# Patient Record
Sex: FEMALE | Race: WHITE | NOT HISPANIC OR LATINO | Employment: FULL TIME | URBAN - METROPOLITAN AREA
[De-identification: names, ages, dates, MRNs, and addresses within clinical notes are randomized per-mention and may not be internally consistent; named-entity substitution may affect disease eponyms.]

---

## 2024-01-19 ENCOUNTER — APPOINTMENT (OUTPATIENT)
Dept: INTERVENTIONAL RADIOLOGY/VASCULAR | Facility: CLINIC | Age: 29
DRG: 270 | End: 2024-01-19
Payer: COMMERCIAL

## 2024-01-19 ENCOUNTER — HOSPITAL ENCOUNTER (INPATIENT)
Facility: CLINIC | Age: 29
LOS: 5 days | Discharge: HOME | DRG: 270 | End: 2024-01-24
Attending: EMERGENCY MEDICINE | Admitting: SURGERY
Payer: COMMERCIAL

## 2024-01-19 DIAGNOSIS — K86.89 FLUID COLLECTION OF PANCREAS: ICD-10-CM

## 2024-01-19 DIAGNOSIS — F10.90 ALCOHOL USE DISORDER: Primary | ICD-10-CM

## 2024-01-19 DIAGNOSIS — I72.8: ICD-10-CM

## 2024-01-19 LAB
ABO/RH(D): NORMAL
ALBUMIN SERPL BCG-MCNC: 3.7 G/DL (ref 3.5–5.2)
ALP SERPL-CCNC: 94 U/L (ref 40–150)
ALT SERPL W P-5'-P-CCNC: 12 U/L (ref 0–50)
ANION GAP SERPL CALCULATED.3IONS-SCNC: 9 MMOL/L (ref 7–15)
ANTIBODY SCREEN: NEGATIVE
APTT PPP: 28 SECONDS (ref 22–38)
AST SERPL W P-5'-P-CCNC: 17 U/L (ref 0–45)
BASOPHILS # BLD AUTO: 0 10E3/UL (ref 0–0.2)
BASOPHILS NFR BLD AUTO: 0 %
BILIRUB SERPL-MCNC: 0.3 MG/DL
BUN SERPL-MCNC: 8.2 MG/DL (ref 6–20)
CALCIUM SERPL-MCNC: 9 MG/DL (ref 8.6–10)
CHLORIDE SERPL-SCNC: 105 MMOL/L (ref 98–107)
CREAT SERPL-MCNC: 0.6 MG/DL (ref 0.51–0.95)
DEPRECATED HCO3 PLAS-SCNC: 23 MMOL/L (ref 22–29)
EGFRCR SERPLBLD CKD-EPI 2021: >90 ML/MIN/1.73M2
EOSINOPHIL # BLD AUTO: 0.1 10E3/UL (ref 0–0.7)
EOSINOPHIL NFR BLD AUTO: 1 %
ERYTHROCYTE [DISTWIDTH] IN BLOOD BY AUTOMATED COUNT: 17.6 % (ref 10–15)
GLUCOSE BLDC GLUCOMTR-MCNC: 95 MG/DL (ref 70–99)
GLUCOSE SERPL-MCNC: 103 MG/DL (ref 70–99)
HCT VFR BLD AUTO: 29.4 % (ref 35–47)
HGB BLD-MCNC: 9 G/DL (ref 11.7–15.7)
HOLD SPECIMEN: NORMAL
IMM GRANULOCYTES # BLD: 0 10E3/UL
IMM GRANULOCYTES NFR BLD: 0 %
INR PPP: 1.02 (ref 0.85–1.15)
LACTATE SERPL-SCNC: 0.7 MMOL/L (ref 0.7–2)
LYMPHOCYTES # BLD AUTO: 1 10E3/UL (ref 0.8–5.3)
LYMPHOCYTES NFR BLD AUTO: 11 %
MCH RBC QN AUTO: 26.2 PG (ref 26.5–33)
MCHC RBC AUTO-ENTMCNC: 30.6 G/DL (ref 31.5–36.5)
MCV RBC AUTO: 86 FL (ref 78–100)
MONOCYTES # BLD AUTO: 0.5 10E3/UL (ref 0–1.3)
MONOCYTES NFR BLD AUTO: 6 %
NEUTROPHILS # BLD AUTO: 7.1 10E3/UL (ref 1.6–8.3)
NEUTROPHILS NFR BLD AUTO: 82 %
NRBC # BLD AUTO: 0 10E3/UL
NRBC BLD AUTO-RTO: 0 /100
PLATELET # BLD AUTO: 418 10E3/UL (ref 150–450)
POTASSIUM SERPL-SCNC: 4.1 MMOL/L (ref 3.4–5.3)
PROT SERPL-MCNC: 7.1 G/DL (ref 6.4–8.3)
RBC # BLD AUTO: 3.44 10E6/UL (ref 3.8–5.2)
SODIUM SERPL-SCNC: 137 MMOL/L (ref 135–145)
SPECIMEN EXPIRATION DATE: NORMAL
WBC # BLD AUTO: 8.8 10E3/UL (ref 4–11)

## 2024-01-19 PROCEDURE — C1769 GUIDE WIRE: HCPCS

## 2024-01-19 PROCEDURE — 250N000011 HC RX IP 250 OP 636: Performed by: STUDENT IN AN ORGANIZED HEALTH CARE EDUCATION/TRAINING PROGRAM

## 2024-01-19 PROCEDURE — C1889 IMPLANT/INSERT DEVICE, NOC: HCPCS

## 2024-01-19 PROCEDURE — 83605 ASSAY OF LACTIC ACID: CPT | Performed by: EMERGENCY MEDICINE

## 2024-01-19 PROCEDURE — 99221 1ST HOSP IP/OBS SF/LOW 40: CPT | Mod: GC | Performed by: SURGERY

## 2024-01-19 PROCEDURE — 84100 ASSAY OF PHOSPHORUS: CPT

## 2024-01-19 PROCEDURE — 76937 US GUIDE VASCULAR ACCESS: CPT

## 2024-01-19 PROCEDURE — 99285 EMERGENCY DEPT VISIT HI MDM: CPT | Performed by: EMERGENCY MEDICINE

## 2024-01-19 PROCEDURE — 99152 MOD SED SAME PHYS/QHP 5/>YRS: CPT | Mod: GC | Performed by: RADIOLOGY

## 2024-01-19 PROCEDURE — 86900 BLOOD TYPING SEROLOGIC ABO: CPT | Performed by: EMERGENCY MEDICINE

## 2024-01-19 PROCEDURE — 04L33DZ OCCLUSION OF HEPATIC ARTERY WITH INTRALUMINAL DEVICE, PERCUTANEOUS APPROACH: ICD-10-PCS | Performed by: RADIOLOGY

## 2024-01-19 PROCEDURE — 85025 COMPLETE CBC W/AUTO DIFF WBC: CPT | Performed by: EMERGENCY MEDICINE

## 2024-01-19 PROCEDURE — 76937 US GUIDE VASCULAR ACCESS: CPT | Mod: 26 | Performed by: RADIOLOGY

## 2024-01-19 PROCEDURE — 85610 PROTHROMBIN TIME: CPT | Performed by: EMERGENCY MEDICINE

## 2024-01-19 PROCEDURE — 36247 INS CATH ABD/L-EXT ART 3RD: CPT

## 2024-01-19 PROCEDURE — 272N000143 HC KIT CR3

## 2024-01-19 PROCEDURE — 37242 VASC EMBOLIZE/OCCLUDE ARTERY: CPT | Mod: GC | Performed by: RADIOLOGY

## 2024-01-19 PROCEDURE — 83735 ASSAY OF MAGNESIUM: CPT

## 2024-01-19 PROCEDURE — 82728 ASSAY OF FERRITIN: CPT

## 2024-01-19 PROCEDURE — 83550 IRON BINDING TEST: CPT

## 2024-01-19 PROCEDURE — 200N000002 HC R&B ICU UMMC

## 2024-01-19 PROCEDURE — 255N000002 HC RX 255 OP 636: Performed by: RADIOLOGY

## 2024-01-19 PROCEDURE — 36248 INS CATH ABD/L-EXT ART ADDL: CPT | Mod: GC | Performed by: RADIOLOGY

## 2024-01-19 PROCEDURE — 80053 COMPREHEN METABOLIC PANEL: CPT | Performed by: EMERGENCY MEDICINE

## 2024-01-19 PROCEDURE — 75774 ARTERY X-RAY EACH VESSEL: CPT | Mod: 26 | Performed by: RADIOLOGY

## 2024-01-19 PROCEDURE — 75726 ARTERY X-RAYS ABDOMEN: CPT

## 2024-01-19 PROCEDURE — 272N000566 HC SHEATH CR3

## 2024-01-19 PROCEDURE — C1887 CATHETER, GUIDING: HCPCS

## 2024-01-19 PROCEDURE — 250N000013 HC RX MED GY IP 250 OP 250 PS 637

## 2024-01-19 PROCEDURE — 85018 HEMOGLOBIN: CPT

## 2024-01-19 PROCEDURE — 250N000011 HC RX IP 250 OP 636: Performed by: EMERGENCY MEDICINE

## 2024-01-19 PROCEDURE — 93010 ELECTROCARDIOGRAM REPORT: CPT | Performed by: INTERNAL MEDICINE

## 2024-01-19 PROCEDURE — 250N000011 HC RX IP 250 OP 636: Performed by: RADIOLOGY

## 2024-01-19 PROCEDURE — 75726 ARTERY X-RAYS ABDOMEN: CPT | Mod: 26 | Performed by: RADIOLOGY

## 2024-01-19 PROCEDURE — 36247 INS CATH ABD/L-EXT ART 3RD: CPT | Mod: GC | Performed by: RADIOLOGY

## 2024-01-19 PROCEDURE — 37243 VASC EMBOLIZE/OCCLUDE ORGAN: CPT

## 2024-01-19 PROCEDURE — 36415 COLL VENOUS BLD VENIPUNCTURE: CPT | Performed by: EMERGENCY MEDICINE

## 2024-01-19 PROCEDURE — 258N000003 HC RX IP 258 OP 636

## 2024-01-19 PROCEDURE — 75774 ARTERY X-RAY EACH VESSEL: CPT

## 2024-01-19 PROCEDURE — 250N000009 HC RX 250: Performed by: STUDENT IN AN ORGANIZED HEALTH CARE EDUCATION/TRAINING PROGRAM

## 2024-01-19 PROCEDURE — 36415 COLL VENOUS BLD VENIPUNCTURE: CPT

## 2024-01-19 PROCEDURE — 04L53DZ OCCLUSION OF SUPERIOR MESENTERIC ARTERY WITH INTRALUMINAL DEVICE, PERCUTANEOUS APPROACH: ICD-10-PCS | Performed by: RADIOLOGY

## 2024-01-19 PROCEDURE — 85730 THROMBOPLASTIN TIME PARTIAL: CPT | Performed by: EMERGENCY MEDICINE

## 2024-01-19 RX ORDER — FENTANYL CITRATE 50 UG/ML
25-50 INJECTION, SOLUTION INTRAMUSCULAR; INTRAVENOUS EVERY 5 MIN PRN
Status: DISCONTINUED | OUTPATIENT
Start: 2024-01-19 | End: 2024-01-19 | Stop reason: HOSPADM

## 2024-01-19 RX ORDER — ONDANSETRON 2 MG/ML
4 INJECTION INTRAMUSCULAR; INTRAVENOUS
Status: COMPLETED | OUTPATIENT
Start: 2024-01-19 | End: 2024-01-19

## 2024-01-19 RX ORDER — CEFAZOLIN SODIUM 2 G/100ML
2 INJECTION, SOLUTION INTRAVENOUS ONCE
Status: COMPLETED | OUTPATIENT
Start: 2024-01-19 | End: 2024-01-19

## 2024-01-19 RX ORDER — OLANZAPINE 5 MG/1
5-10 TABLET, ORALLY DISINTEGRATING ORAL EVERY 6 HOURS PRN
Status: CANCELLED | OUTPATIENT
Start: 2024-01-19

## 2024-01-19 RX ORDER — LIDOCAINE 40 MG/G
CREAM TOPICAL
Status: DISCONTINUED | OUTPATIENT
Start: 2024-01-19 | End: 2024-01-19 | Stop reason: HOSPADM

## 2024-01-19 RX ORDER — POLYETHYLENE GLYCOL 3350 17 G/17G
17 POWDER, FOR SOLUTION ORAL 2 TIMES DAILY PRN
Status: DISCONTINUED | OUTPATIENT
Start: 2024-01-19 | End: 2024-01-24 | Stop reason: HOSPADM

## 2024-01-19 RX ORDER — NALOXONE HYDROCHLORIDE 0.4 MG/ML
0.4 INJECTION, SOLUTION INTRAMUSCULAR; INTRAVENOUS; SUBCUTANEOUS
Status: DISCONTINUED | OUTPATIENT
Start: 2024-01-19 | End: 2024-01-19 | Stop reason: HOSPADM

## 2024-01-19 RX ORDER — CALCIUM CARBONATE 500 MG/1
1000 TABLET, CHEWABLE ORAL 4 TIMES DAILY PRN
Status: DISCONTINUED | OUTPATIENT
Start: 2024-01-19 | End: 2024-01-24 | Stop reason: HOSPADM

## 2024-01-19 RX ORDER — MULTIPLE VITAMINS W/ MINERALS TAB 9MG-400MCG
1 TAB ORAL DAILY
Status: DISCONTINUED | OUTPATIENT
Start: 2024-01-20 | End: 2024-01-24 | Stop reason: HOSPADM

## 2024-01-19 RX ORDER — NALOXONE HYDROCHLORIDE 0.4 MG/ML
0.4 INJECTION, SOLUTION INTRAMUSCULAR; INTRAVENOUS; SUBCUTANEOUS
Status: DISCONTINUED | OUTPATIENT
Start: 2024-01-19 | End: 2024-01-24 | Stop reason: HOSPADM

## 2024-01-19 RX ORDER — AMOXICILLIN 250 MG
1 CAPSULE ORAL 2 TIMES DAILY PRN
Status: DISCONTINUED | OUTPATIENT
Start: 2024-01-19 | End: 2024-01-24 | Stop reason: HOSPADM

## 2024-01-19 RX ORDER — ACETAMINOPHEN 325 MG/1
650 TABLET ORAL EVERY 6 HOURS PRN
Status: DISCONTINUED | OUTPATIENT
Start: 2024-01-19 | End: 2024-01-20

## 2024-01-19 RX ORDER — LIDOCAINE 40 MG/G
CREAM TOPICAL
Status: DISCONTINUED | OUTPATIENT
Start: 2024-01-19 | End: 2024-01-24 | Stop reason: HOSPADM

## 2024-01-19 RX ORDER — AMOXICILLIN 250 MG
2 CAPSULE ORAL 2 TIMES DAILY PRN
Status: DISCONTINUED | OUTPATIENT
Start: 2024-01-19 | End: 2024-01-24 | Stop reason: HOSPADM

## 2024-01-19 RX ORDER — HEPARIN SODIUM 200 [USP'U]/100ML
1 INJECTION, SOLUTION INTRAVENOUS CONTINUOUS PRN
Status: DISCONTINUED | OUTPATIENT
Start: 2024-01-19 | End: 2024-01-19 | Stop reason: HOSPADM

## 2024-01-19 RX ORDER — IODIXANOL 320 MG/ML
100 INJECTION, SOLUTION INTRAVASCULAR ONCE
Status: COMPLETED | OUTPATIENT
Start: 2024-01-19 | End: 2024-01-19

## 2024-01-19 RX ORDER — FLUMAZENIL 0.1 MG/ML
0.2 INJECTION, SOLUTION INTRAVENOUS
Status: DISCONTINUED | OUTPATIENT
Start: 2024-01-19 | End: 2024-01-19 | Stop reason: HOSPADM

## 2024-01-19 RX ORDER — FOLIC ACID 1 MG/1
1 TABLET ORAL DAILY
Status: DISCONTINUED | OUTPATIENT
Start: 2024-01-20 | End: 2024-01-24 | Stop reason: HOSPADM

## 2024-01-19 RX ORDER — NALOXONE HYDROCHLORIDE 0.4 MG/ML
0.2 INJECTION, SOLUTION INTRAMUSCULAR; INTRAVENOUS; SUBCUTANEOUS
Status: DISCONTINUED | OUTPATIENT
Start: 2024-01-19 | End: 2024-01-24 | Stop reason: HOSPADM

## 2024-01-19 RX ORDER — SODIUM CHLORIDE, SODIUM LACTATE, POTASSIUM CHLORIDE, CALCIUM CHLORIDE 600; 310; 30; 20 MG/100ML; MG/100ML; MG/100ML; MG/100ML
INJECTION, SOLUTION INTRAVENOUS CONTINUOUS
Status: DISCONTINUED | OUTPATIENT
Start: 2024-01-19 | End: 2024-01-20

## 2024-01-19 RX ORDER — FENTANYL CITRATE 50 UG/ML
50 INJECTION, SOLUTION INTRAMUSCULAR; INTRAVENOUS ONCE
Status: COMPLETED | OUTPATIENT
Start: 2024-01-19 | End: 2024-01-19

## 2024-01-19 RX ORDER — LORAZEPAM 1 MG/1
1-2 TABLET ORAL EVERY 30 MIN PRN
Status: CANCELLED | OUTPATIENT
Start: 2024-01-19

## 2024-01-19 RX ORDER — HALOPERIDOL 5 MG/ML
2.5-5 INJECTION INTRAMUSCULAR EVERY 6 HOURS PRN
Status: CANCELLED | OUTPATIENT
Start: 2024-01-19

## 2024-01-19 RX ORDER — ACETAMINOPHEN 650 MG/1
650 SUPPOSITORY RECTAL EVERY 4 HOURS PRN
Status: DISCONTINUED | OUTPATIENT
Start: 2024-01-19 | End: 2024-01-20

## 2024-01-19 RX ORDER — ONDANSETRON 4 MG/1
4 TABLET, ORALLY DISINTEGRATING ORAL EVERY 6 HOURS PRN
Status: DISCONTINUED | OUTPATIENT
Start: 2024-01-19 | End: 2024-01-20

## 2024-01-19 RX ORDER — CLONIDINE HYDROCHLORIDE 0.1 MG/1
0.1 TABLET ORAL EVERY 8 HOURS
Status: CANCELLED | OUTPATIENT
Start: 2024-01-19

## 2024-01-19 RX ORDER — MULTIVITAMIN
1 TABLET ORAL DAILY
COMMUNITY

## 2024-01-19 RX ORDER — OXYCODONE HYDROCHLORIDE 5 MG/1
5 TABLET ORAL EVERY 4 HOURS PRN
Status: DISCONTINUED | OUTPATIENT
Start: 2024-01-19 | End: 2024-01-22

## 2024-01-19 RX ORDER — LORAZEPAM 2 MG/ML
1-2 INJECTION INTRAMUSCULAR EVERY 30 MIN PRN
Status: CANCELLED | OUTPATIENT
Start: 2024-01-19

## 2024-01-19 RX ORDER — NALOXONE HYDROCHLORIDE 0.4 MG/ML
0.2 INJECTION, SOLUTION INTRAMUSCULAR; INTRAVENOUS; SUBCUTANEOUS
Status: DISCONTINUED | OUTPATIENT
Start: 2024-01-19 | End: 2024-01-19 | Stop reason: HOSPADM

## 2024-01-19 RX ORDER — FLUMAZENIL 0.1 MG/ML
0.2 INJECTION, SOLUTION INTRAVENOUS
Status: CANCELLED | OUTPATIENT
Start: 2024-01-19

## 2024-01-19 RX ORDER — CEFAZOLIN SODIUM 2 G/100ML
2 INJECTION, SOLUTION INTRAVENOUS EVERY 8 HOURS
Status: DISCONTINUED | OUTPATIENT
Start: 2024-01-19 | End: 2024-01-19

## 2024-01-19 RX ADMIN — MIDAZOLAM 0.5 MG: 1 INJECTION INTRAMUSCULAR; INTRAVENOUS at 21:23

## 2024-01-19 RX ADMIN — FENTANYL CITRATE 25 MCG: 50 INJECTION, SOLUTION INTRAMUSCULAR; INTRAVENOUS at 20:31

## 2024-01-19 RX ADMIN — CEFAZOLIN SODIUM 2 G: 2 INJECTION, SOLUTION INTRAVENOUS at 19:36

## 2024-01-19 RX ADMIN — FENTANYL CITRATE 25 MCG: 50 INJECTION, SOLUTION INTRAMUSCULAR; INTRAVENOUS at 20:00

## 2024-01-19 RX ADMIN — FENTANYL CITRATE 50 MCG: 50 INJECTION, SOLUTION INTRAMUSCULAR; INTRAVENOUS at 20:45

## 2024-01-19 RX ADMIN — FENTANYL CITRATE 50 MCG: 50 INJECTION, SOLUTION INTRAMUSCULAR; INTRAVENOUS at 19:37

## 2024-01-19 RX ADMIN — FENTANYL CITRATE 25 MCG: 50 INJECTION, SOLUTION INTRAMUSCULAR; INTRAVENOUS at 19:51

## 2024-01-19 RX ADMIN — SODIUM CHLORIDE, POTASSIUM CHLORIDE, SODIUM LACTATE AND CALCIUM CHLORIDE: 600; 310; 30; 20 INJECTION, SOLUTION INTRAVENOUS at 22:46

## 2024-01-19 RX ADMIN — FENTANYL CITRATE 50 MCG: 50 INJECTION, SOLUTION INTRAMUSCULAR; INTRAVENOUS at 21:10

## 2024-01-19 RX ADMIN — IODIXANOL 75 ML: 320 INJECTION, SOLUTION INTRAVASCULAR at 22:15

## 2024-01-19 RX ADMIN — IODIXANOL 100 ML: 320 INJECTION, SOLUTION INTRAVASCULAR at 22:14

## 2024-01-19 RX ADMIN — FENTANYL CITRATE 25 MCG: 50 INJECTION, SOLUTION INTRAMUSCULAR; INTRAVENOUS at 21:43

## 2024-01-19 RX ADMIN — FENTANYL CITRATE 25 MCG: 50 INJECTION, SOLUTION INTRAMUSCULAR; INTRAVENOUS at 21:23

## 2024-01-19 RX ADMIN — MIDAZOLAM 0.5 MG: 1 INJECTION INTRAMUSCULAR; INTRAVENOUS at 20:57

## 2024-01-19 RX ADMIN — MIDAZOLAM 0.5 MG: 1 INJECTION INTRAMUSCULAR; INTRAVENOUS at 20:01

## 2024-01-19 RX ADMIN — LIDOCAINE HYDROCHLORIDE 7 ML: 10 INJECTION, SOLUTION EPIDURAL; INFILTRATION; INTRACAUDAL; PERINEURAL at 19:52

## 2024-01-19 RX ADMIN — MIDAZOLAM 0.5 MG: 1 INJECTION INTRAMUSCULAR; INTRAVENOUS at 20:31

## 2024-01-19 RX ADMIN — HEPARIN SODIUM 2 BAG: 200 INJECTION, SOLUTION INTRAVENOUS at 19:42

## 2024-01-19 RX ADMIN — ONDANSETRON 4 MG: 2 INJECTION INTRAMUSCULAR; INTRAVENOUS at 21:19

## 2024-01-19 RX ADMIN — MIDAZOLAM 1 MG: 1 INJECTION INTRAMUSCULAR; INTRAVENOUS at 19:37

## 2024-01-19 RX ADMIN — MIDAZOLAM 0.5 MG: 1 INJECTION INTRAMUSCULAR; INTRAVENOUS at 21:43

## 2024-01-19 RX ADMIN — FENTANYL CITRATE 25 MCG: 50 INJECTION, SOLUTION INTRAMUSCULAR; INTRAVENOUS at 21:54

## 2024-01-19 RX ADMIN — FENTANYL CITRATE 50 MCG: 50 INJECTION, SOLUTION INTRAMUSCULAR; INTRAVENOUS at 18:58

## 2024-01-19 RX ADMIN — MIDAZOLAM 0.5 MG: 1 INJECTION INTRAMUSCULAR; INTRAVENOUS at 19:51

## 2024-01-19 RX ADMIN — FENTANYL CITRATE 25 MCG: 50 INJECTION, SOLUTION INTRAMUSCULAR; INTRAVENOUS at 20:57

## 2024-01-19 RX ADMIN — OXYCODONE HYDROCHLORIDE 5 MG: 5 TABLET ORAL at 23:40

## 2024-01-19 ASSESSMENT — ACTIVITIES OF DAILY LIVING (ADL)
ADLS_ACUITY_SCORE: 35

## 2024-01-19 NOTE — ED TRIAGE NOTES
Pt arrived via flight from \[facility]\"" in [place]. Pt c/c of abd pain. Pt was diagnosed w/ ct pseudoaneurysm of pancreatic artery      Triage Assessment (Adult)       Row Name 01/19/24 2296          Triage Assessment    Airway WDL WDL     Additional Documentation Breath Sounds (Group)        Respiratory WDL    Respiratory WDL WDL        Breath Sounds    Breath Sounds All Fields        Skin Circulation/Temperature WDL    Skin Circulation/Temperature WDL WDL        Cardiac WDL    Cardiac WDL WDL;rhythm     Pulse Rate & Regularity tachycardic        Peripheral/Neurovascular WDL    Peripheral Neurovascular WDL WDL        Cognitive/Neuro/Behavioral WDL    Cognitive/Neuro/Behavioral WDL WDL

## 2024-01-20 LAB
ALBUMIN SERPL BCG-MCNC: 3.5 G/DL (ref 3.5–5.2)
ALP SERPL-CCNC: 89 U/L (ref 40–150)
ALT SERPL W P-5'-P-CCNC: <5 U/L (ref 0–50)
ANION GAP SERPL CALCULATED.3IONS-SCNC: 12 MMOL/L (ref 7–15)
AST SERPL W P-5'-P-CCNC: 15 U/L (ref 0–45)
BILIRUB SERPL-MCNC: 0.4 MG/DL
BUN SERPL-MCNC: 6.2 MG/DL (ref 6–20)
CALCIUM SERPL-MCNC: 9.2 MG/DL (ref 8.6–10)
CHLORIDE SERPL-SCNC: 100 MMOL/L (ref 98–107)
CREAT SERPL-MCNC: 0.66 MG/DL (ref 0.51–0.95)
DEPRECATED HCO3 PLAS-SCNC: 22 MMOL/L (ref 22–29)
EGFRCR SERPLBLD CKD-EPI 2021: >90 ML/MIN/1.73M2
ERYTHROCYTE [DISTWIDTH] IN BLOOD BY AUTOMATED COUNT: 17.6 % (ref 10–15)
FERRITIN SERPL-MCNC: 77 NG/ML (ref 6–175)
FOLATE SERPL-MCNC: 9.7 NG/ML (ref 4.6–34.8)
GLUCOSE BLDC GLUCOMTR-MCNC: 116 MG/DL (ref 70–99)
GLUCOSE SERPL-MCNC: 110 MG/DL (ref 70–99)
HCT VFR BLD AUTO: 27.1 % (ref 35–47)
HCT VFR BLD AUTO: 27.8 % (ref 35–47)
HCT VFR BLD AUTO: 28.3 % (ref 35–47)
HCT VFR BLD AUTO: 29 % (ref 35–47)
HGB BLD-MCNC: 8.2 G/DL (ref 11.7–15.7)
HGB BLD-MCNC: 8.5 G/DL (ref 11.7–15.7)
HGB BLD-MCNC: 8.6 G/DL (ref 11.7–15.7)
HGB BLD-MCNC: 8.6 G/DL (ref 11.7–15.7)
HGB BLD-MCNC: 8.7 G/DL (ref 11.7–15.7)
IRON BINDING CAPACITY (ROCHE): 302 UG/DL (ref 240–430)
IRON SATN MFR SERPL: 4 % (ref 15–46)
IRON SERPL-MCNC: 12 UG/DL (ref 37–145)
MAGNESIUM SERPL-MCNC: 1.6 MG/DL (ref 1.7–2.3)
MAGNESIUM SERPL-MCNC: 1.7 MG/DL (ref 1.7–2.3)
MCH RBC QN AUTO: 26.1 PG (ref 26.5–33)
MCHC RBC AUTO-ENTMCNC: 30.6 G/DL (ref 31.5–36.5)
MCV RBC AUTO: 85 FL (ref 78–100)
PHOSPHATE SERPL-MCNC: 4.7 MG/DL (ref 2.5–4.5)
PLATELET # BLD AUTO: 418 10E3/UL (ref 150–450)
POTASSIUM SERPL-SCNC: 4.1 MMOL/L (ref 3.4–5.3)
PROT SERPL-MCNC: 6.9 G/DL (ref 6.4–8.3)
RBC # BLD AUTO: 3.26 10E6/UL (ref 3.8–5.2)
SODIUM SERPL-SCNC: 134 MMOL/L (ref 135–145)
VIT B12 SERPL-MCNC: 698 PG/ML (ref 232–1245)
WBC # BLD AUTO: 7.9 10E3/UL (ref 4–11)

## 2024-01-20 PROCEDURE — 250N000013 HC RX MED GY IP 250 OP 250 PS 637

## 2024-01-20 PROCEDURE — 250N000011 HC RX IP 250 OP 636

## 2024-01-20 PROCEDURE — 82746 ASSAY OF FOLIC ACID SERUM: CPT

## 2024-01-20 PROCEDURE — 36415 COLL VENOUS BLD VENIPUNCTURE: CPT

## 2024-01-20 PROCEDURE — 250N000013 HC RX MED GY IP 250 OP 250 PS 637: Performed by: SURGERY

## 2024-01-20 PROCEDURE — 82607 VITAMIN B-12: CPT

## 2024-01-20 PROCEDURE — 99233 SBSQ HOSP IP/OBS HIGH 50: CPT | Mod: GC | Performed by: STUDENT IN AN ORGANIZED HEALTH CARE EDUCATION/TRAINING PROGRAM

## 2024-01-20 PROCEDURE — 93005 ELECTROCARDIOGRAM TRACING: CPT

## 2024-01-20 PROCEDURE — 99207 PR NO BILLABLE SERVICE THIS VISIT: CPT

## 2024-01-20 PROCEDURE — 83735 ASSAY OF MAGNESIUM: CPT | Performed by: SURGERY

## 2024-01-20 PROCEDURE — 85027 COMPLETE CBC AUTOMATED: CPT

## 2024-01-20 PROCEDURE — 258N000003 HC RX IP 258 OP 636

## 2024-01-20 PROCEDURE — 80053 COMPREHEN METABOLIC PANEL: CPT

## 2024-01-20 PROCEDURE — 85014 HEMATOCRIT: CPT

## 2024-01-20 PROCEDURE — 99233 SBSQ HOSP IP/OBS HIGH 50: CPT | Mod: FS

## 2024-01-20 PROCEDURE — 120N000002 HC R&B MED SURG/OB UMMC

## 2024-01-20 RX ORDER — MAGNESIUM OXIDE 400 MG/1
400 TABLET ORAL EVERY 4 HOURS
Status: COMPLETED | OUTPATIENT
Start: 2024-01-20 | End: 2024-01-20

## 2024-01-20 RX ORDER — SODIUM CHLORIDE, SODIUM LACTATE, POTASSIUM CHLORIDE, CALCIUM CHLORIDE 600; 310; 30; 20 MG/100ML; MG/100ML; MG/100ML; MG/100ML
INJECTION, SOLUTION INTRAVENOUS CONTINUOUS
Status: DISCONTINUED | OUTPATIENT
Start: 2024-01-20 | End: 2024-01-20

## 2024-01-20 RX ORDER — SODIUM CHLORIDE, SODIUM LACTATE, POTASSIUM CHLORIDE, CALCIUM CHLORIDE 600; 310; 30; 20 MG/100ML; MG/100ML; MG/100ML; MG/100ML
INJECTION, SOLUTION INTRAVENOUS CONTINUOUS
Status: ACTIVE | OUTPATIENT
Start: 2024-01-20 | End: 2024-01-21

## 2024-01-20 RX ORDER — HYDROMORPHONE HCL IN WATER/PF 6 MG/30 ML
.2-.4 PATIENT CONTROLLED ANALGESIA SYRINGE INTRAVENOUS
Status: DISCONTINUED | OUTPATIENT
Start: 2024-01-20 | End: 2024-01-21

## 2024-01-20 RX ORDER — ONDANSETRON 4 MG/1
4 TABLET, ORALLY DISINTEGRATING ORAL EVERY 6 HOURS PRN
Status: DISCONTINUED | OUTPATIENT
Start: 2024-01-20 | End: 2024-01-24 | Stop reason: HOSPADM

## 2024-01-20 RX ORDER — HYDROMORPHONE HCL IN WATER/PF 6 MG/30 ML
.2-.4 PATIENT CONTROLLED ANALGESIA SYRINGE INTRAVENOUS EVERY 6 HOURS PRN
Status: DISCONTINUED | OUTPATIENT
Start: 2024-01-20 | End: 2024-01-20

## 2024-01-20 RX ORDER — ACETAMINOPHEN 325 MG/1
650 TABLET ORAL EVERY 4 HOURS
Status: DISCONTINUED | OUTPATIENT
Start: 2024-01-20 | End: 2024-01-24 | Stop reason: HOSPADM

## 2024-01-20 RX ORDER — ONDANSETRON 2 MG/ML
4 INJECTION INTRAMUSCULAR; INTRAVENOUS EVERY 6 HOURS PRN
Status: DISCONTINUED | OUTPATIENT
Start: 2024-01-20 | End: 2024-01-24 | Stop reason: HOSPADM

## 2024-01-20 RX ADMIN — ACETAMINOPHEN 650 MG: 325 TABLET, FILM COATED ORAL at 15:43

## 2024-01-20 RX ADMIN — HYDROMORPHONE HYDROCHLORIDE 0.4 MG: 0.2 INJECTION, SOLUTION INTRAMUSCULAR; INTRAVENOUS; SUBCUTANEOUS at 20:28

## 2024-01-20 RX ADMIN — HYDROMORPHONE HYDROCHLORIDE 0.4 MG: 0.2 INJECTION, SOLUTION INTRAMUSCULAR; INTRAVENOUS; SUBCUTANEOUS at 13:37

## 2024-01-20 RX ADMIN — MAGNESIUM OXIDE TAB 400 MG (241.3 MG ELEMENTAL MG) 400 MG: 400 (241.3 MG) TAB at 12:04

## 2024-01-20 RX ADMIN — NICOTINE 1 PATCH: 7 PATCH, EXTENDED RELEASE TRANSDERMAL at 11:02

## 2024-01-20 RX ADMIN — PROCHLORPERAZINE EDISYLATE 10 MG: 5 INJECTION INTRAMUSCULAR; INTRAVENOUS at 09:11

## 2024-01-20 RX ADMIN — MAGNESIUM OXIDE TAB 400 MG (241.3 MG ELEMENTAL MG) 400 MG: 400 (241.3 MG) TAB at 09:09

## 2024-01-20 RX ADMIN — SODIUM CHLORIDE, POTASSIUM CHLORIDE, SODIUM LACTATE AND CALCIUM CHLORIDE: 600; 310; 30; 20 INJECTION, SOLUTION INTRAVENOUS at 17:22

## 2024-01-20 RX ADMIN — HYDROMORPHONE HYDROCHLORIDE 0.4 MG: 0.2 INJECTION, SOLUTION INTRAMUSCULAR; INTRAVENOUS; SUBCUTANEOUS at 17:17

## 2024-01-20 RX ADMIN — SODIUM CHLORIDE, POTASSIUM CHLORIDE, SODIUM LACTATE AND CALCIUM CHLORIDE: 600; 310; 30; 20 INJECTION, SOLUTION INTRAVENOUS at 13:38

## 2024-01-20 RX ADMIN — Medication 1 TABLET: at 07:32

## 2024-01-20 RX ADMIN — OXYCODONE HYDROCHLORIDE 5 MG: 5 TABLET ORAL at 18:18

## 2024-01-20 RX ADMIN — HYDROMORPHONE HYDROCHLORIDE 0.4 MG: 0.2 INJECTION, SOLUTION INTRAMUSCULAR; INTRAVENOUS; SUBCUTANEOUS at 01:14

## 2024-01-20 RX ADMIN — ONDANSETRON 4 MG: 2 INJECTION INTRAMUSCULAR; INTRAVENOUS at 18:32

## 2024-01-20 RX ADMIN — OXYCODONE HYDROCHLORIDE 5 MG: 5 TABLET ORAL at 22:40

## 2024-01-20 RX ADMIN — PROCHLORPERAZINE EDISYLATE 10 MG: 5 INJECTION INTRAMUSCULAR; INTRAVENOUS at 22:32

## 2024-01-20 RX ADMIN — FOLIC ACID 1 MG: 1 TABLET ORAL at 07:32

## 2024-01-20 RX ADMIN — HYDROMORPHONE HYDROCHLORIDE 0.4 MG: 0.2 INJECTION, SOLUTION INTRAMUSCULAR; INTRAVENOUS; SUBCUTANEOUS at 11:02

## 2024-01-20 RX ADMIN — SODIUM CHLORIDE, POTASSIUM CHLORIDE, SODIUM LACTATE AND CALCIUM CHLORIDE: 600; 310; 30; 20 INJECTION, SOLUTION INTRAVENOUS at 09:09

## 2024-01-20 RX ADMIN — THIAMINE HCL TAB 100 MG 100 MG: 100 TAB at 07:32

## 2024-01-20 RX ADMIN — OXYCODONE HYDROCHLORIDE 5 MG: 5 TABLET ORAL at 03:55

## 2024-01-20 RX ADMIN — ACETAMINOPHEN 650 MG: 325 TABLET, FILM COATED ORAL at 12:04

## 2024-01-20 RX ADMIN — ONDANSETRON 4 MG: 4 TABLET, ORALLY DISINTEGRATING ORAL at 07:14

## 2024-01-20 RX ADMIN — ACETAMINOPHEN 650 MG: 325 TABLET, FILM COATED ORAL at 09:09

## 2024-01-20 RX ADMIN — ACETAMINOPHEN 650 MG: 325 TABLET, FILM COATED ORAL at 20:28

## 2024-01-20 RX ADMIN — SODIUM CHLORIDE, POTASSIUM CHLORIDE, SODIUM LACTATE AND CALCIUM CHLORIDE: 600; 310; 30; 20 INJECTION, SOLUTION INTRAVENOUS at 07:40

## 2024-01-20 RX ADMIN — OXYCODONE HYDROCHLORIDE 5 MG: 5 TABLET ORAL at 14:31

## 2024-01-20 RX ADMIN — HYDROMORPHONE HYDROCHLORIDE 0.4 MG: 0.2 INJECTION, SOLUTION INTRAMUSCULAR; INTRAVENOUS; SUBCUTANEOUS at 07:14

## 2024-01-20 RX ADMIN — OXYCODONE HYDROCHLORIDE 5 MG: 5 TABLET ORAL at 09:09

## 2024-01-20 ASSESSMENT — ACTIVITIES OF DAILY LIVING (ADL)
ADLS_ACUITY_SCORE: 23
ADLS_ACUITY_SCORE: 26
ADLS_ACUITY_SCORE: 21
ADLS_ACUITY_SCORE: 21
ADLS_ACUITY_SCORE: 26
ADLS_ACUITY_SCORE: 21
ADLS_ACUITY_SCORE: 23
ADLS_ACUITY_SCORE: 26
ADLS_ACUITY_SCORE: 21
ADLS_ACUITY_SCORE: 21
ADLS_ACUITY_SCORE: 26
ADLS_ACUITY_SCORE: 21

## 2024-01-20 NOTE — PROGRESS NOTES
Brief IR note:    Patient was seen and examined. Patient reports she is doing well. Puncture site is a little tender but is tolerable. Tingling in the right foot she had previously mentioned is resolved. Still having abdominal pain that is the same in quality as prior to the procedure but is reduced in severity. Ambulating without difficulty and tolerating PO.    Right groin puncture clean dry and intact. Right leg pulses strong.    Recommendations:  - CTA of the abdomen and pelvis Early next week 1/22 or 1/23 to ensure resolution of pseudoaneurysms.   - IR will follow peripherally, do not hesitate to reach out with questions.     Mitchell Lewis MD    I reviewed all pertinent data and agree with the assessment and plan documented by Dr. Lewis.    Darcy Dodge MD  Interventional Radiology   Pager [phone]

## 2024-01-20 NOTE — IR NOTE
Patient Name: Rani Lisa  Medical Record Number: [id number]  Today's Date: 1/19/2024    Procedure: visceral angiogram with embolization  Proceduralist: MD LONNY Dodge, MD BULMARO Lewis  Pathology present: N/A  Brief completed: N/A    Procedure Start: 1950  Procedure end: 2158  Sedation medications administered: 4 mg of versed, 325 mcg of fentanyl  Sedation time:  141 minutes (3388-9442)  Other: 4 mg IV ondansetron (2119)    Report given to:  RN  : N/A    Right groin site accessed at 1959. Right groin site de-accessed at 2157 using Angio-Seal for closure - flat bedrest x 2 hours, till 0000 (1/20/24).    Other Notes: Pt arrived to IR room 01 from ED. Consent reviewed. Pt denies any questions or concerns regarding procedure. Pt positioned supine and monitored per protocol. Pt tolerated procedure without any noted complications. Pt transferred back to .

## 2024-01-20 NOTE — ED PROVIDER NOTES
ED PROVIDER NOTE  January 19, 2024  History     Chief Complaint   Patient presents with    Abdominal Pain     HPI  Rani Lisa is a 28 year old female who has a history significant for alcohol misuse complicated by alcoholic pancreatitis.  She is arriving to the [facility] emergency department as a transfer from [facility] due to concern of expanding pancreatic pseudoaneurysm.  The patient reports she was told that she had a pancreatic pseudoaneurysm several weeks ago however overnight developed increasing centralized abdominal pain rated at severe in intensity.  She was treated locally with morphine and had laboratory studies which were overall initially reassuring.  The patient did undergo imaging concerning for above.  The provider did reach out to [facility] at the closest facility stating they were inadequately equipped to manage this recommending transfer.  Upon arrival patient reports ongoing pain currently rated at 6 of 10.  Patient reports no abrupt change in symptoms.  At this time she denies nausea or vomiting.  No recent trauma.  She denies melena or hematochezia.  No generalized abdominal discomfort.  Patient otherwise reports beyond pancreatitis no active or chronic medical conditions.      Past Medical History  Past Medical History:   Diagnosis Date    ADHD (attention deficit hyperactivity disorder)     Alcohol dependence (H)     Alcoholic pancreatitis     Hyperglycemia     Hypomagnesemia     Hyponatremia     Leukocytosis     Nexplanon insertion      History reviewed. No pertinent surgical history.  No current outpatient medications on file.    Allergies   Allergen Reactions    Bactrim [Sulfamethoxazole-Trimethoprim] Rash     Family History  History reviewed. No pertinent family history.  Social History          A medically appropriate review of systems was performed with pertinent positives and negatives noted in the HPI, and all other systems  "negative.      Physical Exam   BP: (!) 127/96  Pulse: 120  Temp: 98.4  F (36.9  C)  Resp: 16  Height: 165.1 cm (5' 5\")  Weight: 54.4 kg (120 lb)  SpO2: 99 %      Physical Exam  Vitals and nursing note reviewed.   Constitutional:       General: She is in acute distress.      Appearance: Normal appearance. She is not ill-appearing, toxic-appearing or diaphoretic.   HENT:      Head: Normocephalic and atraumatic.      Right Ear: External ear normal.      Left Ear: External ear normal.      Nose: Nose normal. No congestion.      Mouth/Throat:      Mouth: Mucous membranes are moist.      Pharynx: Oropharynx is clear. No oropharyngeal exudate.   Eyes:      Extraocular Movements: Extraocular movements intact.      Conjunctiva/sclera: Conjunctivae normal.      Pupils: Pupils are equal, round, and reactive to light.   Cardiovascular:      Rate and Rhythm: Regular rhythm. Tachycardia present.      Pulses: Normal pulses.      Heart sounds: Normal heart sounds.   Pulmonary:      Effort: Pulmonary effort is normal. No respiratory distress.   Abdominal:      General: Abdomen is flat. There is no distension.      Tenderness: There is abdominal tenderness. There is no guarding or rebound.   Musculoskeletal:         General: No deformity or signs of injury. Normal range of motion.      Cervical back: Normal range of motion.   Skin:     General: Skin is warm.      Capillary Refill: Capillary refill takes less than 2 seconds.      Coloration: Skin is pale.      Findings: No bruising or erythema.   Neurological:      General: No focal deficit present.      Mental Status: She is alert and oriented to person, place, and time.      Cranial Nerves: No cranial nerve deficit.      Motor: No weakness.   Psychiatric:         Mood and Affect: Mood normal.         Behavior: Behavior normal.         ED Course        Procedures         Results for orders placed or performed during the hospital encounter of 01/19/24 (from the past 24 hour(s))   CBC " with platelets differential    Narrative    The following orders were created for panel order CBC with platelets differential.  Procedure                               Abnormality         Status                     ---------                               -----------         ------                     CBC with platelets and d...[[id number]]  Abnormal            Final result                 Please view results for these tests on the individual orders.   Comprehensive metabolic panel   Result Value Ref Range    Sodium 137 135 - 145 mmol/L    Potassium 4.1 3.4 - 5.3 mmol/L    Carbon Dioxide (CO2) 23 22 - 29 mmol/L    Anion Gap 9 7 - 15 mmol/L    Urea Nitrogen 8.2 6.0 - 20.0 mg/dL    Creatinine 0.60 0.51 - 0.95 mg/dL    GFR Estimate >90 >60 mL/min/1.73m2    Calcium 9.0 8.6 - 10.0 mg/dL    Chloride 105 98 - 107 mmol/L    Glucose 103 (H) 70 - 99 mg/dL    Alkaline Phosphatase 94 40 - 150 U/L    AST 17 0 - 45 U/L    ALT 12 0 - 50 U/L    Protein Total 7.1 6.4 - 8.3 g/dL    Albumin 3.7 3.5 - 5.2 g/dL    Bilirubin Total 0.3 <=1.2 mg/dL   Lactic acid whole blood   Result Value Ref Range    Lactic Acid 0.7 0.7 - 2.0 mmol/L   INR   Result Value Ref Range    INR 1.02 0.85 - 1.15   ABO/Rh type and screen *Canceled*    Narrative    The following orders were created for panel order ABO/Rh type and screen.  Procedure                               Abnormality         Status                     ---------                               -----------         ------                     Adult Type and Screen[[id number]]                                                         Please view results for these tests on the individual orders.   Partial thromboplastin time   Result Value Ref Range    aPTT 28 22 - 38 Seconds   CBC with platelets and differential   Result Value Ref Range    WBC Count 8.8 4.0 - 11.0 10e3/uL    RBC Count 3.44 (L) 3.80 - 5.20 10e6/uL    Hemoglobin 9.0 (L) 11.7 - 15.7 g/dL    Hematocrit 29.4 (L) 35.0 - 47.0 %    MCV 86 78 -  100 fL    MCH 26.2 (L) 26.5 - 33.0 pg    MCHC 30.6 (L) 31.5 - 36.5 g/dL    RDW 17.6 (H) 10.0 - 15.0 %    Platelet Count 418 150 - 450 10e3/uL    % Neutrophils 82 %    % Lymphocytes 11 %    % Monocytes 6 %    % Eosinophils 1 %    % Basophils 0 %    % Immature Granulocytes 0 %    NRBCs per 100 WBC 0 <1 /100    Absolute Neutrophils 7.1 1.6 - 8.3 10e3/uL    Absolute Lymphocytes 1.0 0.8 - 5.3 10e3/uL    Absolute Monocytes 0.5 0.0 - 1.3 10e3/uL    Absolute Eosinophils 0.1 0.0 - 0.7 10e3/uL    Absolute Basophils 0.0 0.0 - 0.2 10e3/uL    Absolute Immature Granulocytes 0.0 <=0.4 10e3/uL    Absolute NRBCs 0.0 10e3/uL   Extra Tube    Narrative    The following orders were created for panel order Extra Tube.  Procedure                               Abnormality         Status                     ---------                               -----------         ------                     Extra Red Top Tube[[id number]]                               Final result                 Please view results for these tests on the individual orders.   Extra Red Top Tube   Result Value Ref Range    Hold Specimen JI    ABO/Rh type and screen    Narrative    The following orders were created for panel order ABO/Rh type and screen.  Procedure                               Abnormality         Status                     ---------                               -----------         ------                     Adult Type and Screen[[id number]]                            Final result                 Please view results for these tests on the individual orders.   Adult Type and Screen   Result Value Ref Range    ABO/RH(D) A NEG     Antibody Screen Negative Negative    SPECIMEN EXPIRATION DATE [id number]      Medications   lidocaine 1 % 0.1-1 mL (has no administration in time range)   lidocaine (LMX4) cream (has no administration in time range)   sodium chloride (PF) 0.9% PF flush 3 mL (has no administration in time range)   sodium chloride (PF) 0.9% PF flush  3 mL (has no administration in time range)   heparin 2 Units/mL 0.9% NaCl (1000 mL) (2 Bags TABLE SOLN $New Bag 1/19/24 1942)   midazolam (VERSED) injection 0.5-2 mg (0.5 mg Intravenous $Given 1/19/24 2057)   flumazenil (ROMAZICON) injection 0.2 mg (has no administration in time range)   fentaNYL (PF) (SUBLIMAZE) injection 25-50 mcg (50 mcg Intravenous $Given 1/19/24 2110)   naloxone (NARCAN) injection 0.2 mg (has no administration in time range)     Or   naloxone (NARCAN) injection 0.4 mg (has no administration in time range)     Or   naloxone (NARCAN) injection 0.2 mg (has no administration in time range)     Or   naloxone (NARCAN) injection 0.4 mg (has no administration in time range)   iodixanol (VISIPAQUE 320) injection 100 mL (has no administration in time range)   fentaNYL (PF) (SUBLIMAZE) injection 50 mcg (50 mcg Intravenous $Given 1/19/24 1858)   lidocaine 1 % 1-30 mL (7 mLs Intradermal $Given by Other Clinician 1/19/24 1952)   ceFAZolin (ANCEF) 2 g in 100 mL D5W intermittent infusion (0 g Intravenous Stopped 1/19/24 2009)             Critical care was not performed.     Medical Decision Making  The patient's presentation was of high complexity (an acute health issue posing potential threat to life or bodily function).    The patient's evaluation involved:  review of external note(s) from 3+ sources (see separate area of note for details)  review of 3+ test result(s) ordered prior to this encounter (see separate area of note for details)  ordering and/or review of 3+ test(s) in this encounter (see separate area of note for details)    The patient's management necessitated high risk (a decision regarding hospitalization).    Assessments & Plan (with Medical Decision Making)     Rani Lisa is a 28 year old female who has a history significant for alcohol misuse complicated by alcoholic pancreatitis.  She is arriving to the [facility] emergency department as a transfer from [facility]  due to concern of expanding pancreatic pseudoaneurysm.  Upon arrival patient to be alert.  She is presently hemodynamically stable and tachycardic.  She appears to be uncomfortable but is nontoxic.  GCS 15.  She is no signs of obvious acute neurovascular compromise.  She does have tenderness in the abdomen which is not generalized.  By history of a low suspicion for acute rupture however this is of concern which prompted transfer.  I do not see immediate laboratory studies or access to imaging from outside facility which we work on pulling.  Will keep the patient NPO.  Case has been discussed with interventional radiology with recommendation for hospitalization postprocedure on a unit equipped to perform every hour vascular access checks.  Plan for acute hospitalization and interventional radiology consultation for management of expanding pancreatic pseudoaneurysm.    Case discussed with MICU and IR.  Plan will be for interventional procedure and post monitoring in MICU.    I have reviewed the nursing notes.    I have reviewed the findings, diagnosis, plan and need for follow up with the patient.    Current Discharge Medication List          Final diagnoses:   Pancreas artery pseudoaneurysm (H24)       FABIANO IRENE MD    1/19/2024   [facility] EMERGENCY DEPARTMENT     Fabiano Irene MD  01/19/24 1487

## 2024-01-20 NOTE — PLAN OF CARE
Transferred to:  at 1820  Status at time of transfer: Stable  Belongings: Sent with pt  Alcazar removed? (if no, why?): N/A  Chart and medications: Sent with pt  Family notified: Pt will notify      ICU End of Shift Summary. See flowsheets for vital signs and detailed assessment.    Changes this shift: A&Ox4, makes needs known. PRN dilaudid and oxy for abd pain. IR R groin site maintains tender. Tele d/c this shift. RA. Regular diet, fair appetite- intermit nausea resolved with PRN compazine and Zofran. LR continued at 100mL/hr. Mag replaced per protocol. Hgb q6, remains stable.    Plan:  Pain management. CTA Monday. Continue POC.      Goal Outcome Evaluation:      Plan of Care Reviewed With: patient    Overall Patient Progress: improvingOverall Patient Progress: improving

## 2024-01-20 NOTE — PROGRESS NOTES
Medical ICU PROGRESS NOTE  01/20/2024      Date of Service (when I saw the patient): 01/20/2024    ASSESSMENT: The patient is a 29yo female with a history of alcohol use disorder and alcohol induced pancreatitis with known pseudocyst who was transferred from [place] for large pancreatic pseudoaneurysm requiring emergent management.     CHANGES and MAJOR THINGS TODAY:   - Had embolization of two abdominal pseudoaneurysms overnight  - Tylenol scheduled  - Dilaudid frequency increased  - Compazine added for nausea  - Continue LR fluids until this evening, then reassess   - Transfer to floor today      PLAN:    Neuro:  # Pain and sedation  - Tylenol 650mg scheduled Q4 hours for pain, Oxycodone 5mg q4hr Prn  - Dilaudid 0.2-0.4 mg Q3 hours  - RASS goal 0-1     # AUD  Pt with known AUD and alcohol induced pancreatitis. Per pt last drink was 19 days ago with no current concern for withdrawal.  - Patient is tachycardic but no other signs of possible ETOH withdrawal  - Monitor, could consider CIWA if active concern for withdrawal    Pulmonary:  No acute issues at this time  - Patient on RA     Cardiovascular:  # Tachycardia  # HTN  Patient was tachycardic on admit, which was possibly d/t acute pain from increasing pancreatic pseudoaneurysm. Pt has known AUD, reports she has been sober recently (19 days). Also recently had pancreatitis.   - s/p Pancreatic pseudoaneurysm embolization  - Had LR @ 100ml for 10 hours. Will continue the order for another 10 hours this am as patient is still somewhat tachycardic and is just starting to take oral intake.  - Could consider CIWA protocol if active concern for withdrawal. Tachycardia seems isolated at this point and patient should be past the window for withdrawal per her report of last drink.     GI/Nutrition:  # Pancreatic Pseudoaneurysm s/p embolization  # Recent pancreatitis  Transfer from [facility] due to concern of expanding pancreatic pseudoaneurysm. Taken to IR for  embolization on 1/19, successfully embolized 2 pseudoaneurysms one 5cm and another smaller 1cm. Patient tolerated procedure well.  - IR following  - Plan for follow up CTA on Monday/Tuesday 1/22-1/23. Not yet ordered.   - Pain control as above  - Regular diet  - 4 mg Zofran PO or IV Q6 hours PRN  - IV compazine 5-10 mg Q6 hours PRN  - No imaging available of liver, LFTs WNL    IR Visceral Angiogram Impression 1/19/24  1. Diagnostic angiography revealed a large pseudoaneurysm off of the  gastroduodenal artery, a small pseudoaneurysm off of a proximal  jejunal artery, and a small intramural hematoma within the duodenum  off of the gastroduodenal artery.  2. Successful embolization of the large pseudoaneurysm off the  gastroduodenal artery, small pseudoaneurysm off of the proximal  jejunal artery, and small intramural hematoma within the duodenum with  excellent post procedural angiographic result.     Renal/Fluids/Electrolytes:  No acute issues at this time  # Hypomagnesemia  Magnesium 1.6 this am  - RN Magnesium and Potassium protocols in place  - Follow CMP     Endocrine:  No acute issues at this time  Blood glucoses within goal     ID:  No acute issues at this time  Patient reports she did feel like she had some flu like symptoms along with her abdominal pain PTA. She has been afebrile and has no leukocytosis.   - Monitor  - follow fever and wbc curves     Hematology:    # Anemia, unspecified  On admission hemoglobin 9.0, MCV 86. Overall unclear etiology and likely multifactorial given age, gender, AUD, and enlarging pseudoanerysms. No acute active bleeding or sign of bleed.  - CTM  - Serum iron low at 12, Iron sat index low at 4, iron binding capacity WNL, ferritin WNL  - On scheduled vitamin supplementation; folic acid, B12  - Follow hemoglobins Q6 hours x 4 checks today     Musculoskeletal:  # R Groin pain  From recent procedure performed on 1/19, per IR likely to remain sore for couple days. Site appearing  clean, dry, no active bleed, no signs of hematoma. Pain control as above.  - CTM     Skin:  No acute issues at this time     General Cares/Prophylaxis:    Fluids: 100cc/hr until 7 pm. Then  re-evaluate  Nutrition: Regular per IR  DVT Prophylaxis: Mechanical for now given recent IR intervention, overall low risk  GI Prophylaxis: Not indicated  Restraints: None  Family Communication: Updated at bedside  Code Status: Full Code     Lines/tubes/drains:        None        Disposition:  - Okay to transfer to the floor. Will need to stay hospitalized until follow up CTA on Monday or Tuesday.     Patient seen and findings/plan discussed with medical ICU staff, Dr. Roblero.     Time spent: 35 minutes    SIMON Alvarenga CNP    Clinically Significant Risk Factors Present on Admission                               # Anemia: based on hgb <11         ====================================  INTERVAL HISTORY:   Patient remains somewhat tachycardic this morning. Hemoglobin has been stable overnight. She was nauseated this morning which has improved somewhat with antiemetic administration. She will plan to try to eat. She is still having ongoing pain in the right groin area. No signs of hematoma.     OBJECTIVE:   1. VITAL SIGNS:   Temp:  [98.4  F (36.9  C)-98.9  F (37.2  C)] 98.8  F (37.1  C)  Pulse:  [] 116  Resp:  [11-34] 18  BP: (127-155)/() 136/96  SpO2:  [93 %-99 %] 95 %  Resp: 18    2. INTAKE/ OUTPUT:   I/O last 3 completed shifts:  In: 1073.33 [P.O.:350; I.V.:723.33]  Out: -     3. PHYSICAL EXAMINATION:    GEN: Resting in bed, NAD  EYES: PERRL, Anicteric sclera.   HEENT:  Normocephalic, atraumatic  CV: Tachycardic with regular rhythm.   PULM/CHEST: Clear breath sounds bilaterally without rhonchi, crackles or wheeze  GI: normal bowel sounds, soft, TTP in epigastric region  : voids spontaneously  EXTREMITIES: No peripheral edema, pulses easily palpable  NEURO: No focal deficits. Sensation intact. Strength and  movement equal bilaterally.   SKIN: right groin site soft, C/D/I. No hematoma       4. LABS:   Arterial Blood Gases   No lab results found in last 7 days.  Complete Blood Count   Recent Labs   Lab 01/19/24  2336 01/19/24  1740   WBC  --  8.8   HGB 8.6* 9.0*   PLT  --  418     Basic Metabolic Panel  Recent Labs   Lab 01/19/24  2226 01/19/24  1740   NA  --  137   POTASSIUM  --  4.1   CHLORIDE  --  105   CO2  --  23   BUN  --  8.2   CR  --  0.60   GLC 95 103*     Liver Function Tests  Recent Labs   Lab 01/19/24  1740   AST 17   ALT 12   ALKPHOS 94   BILITOTAL 0.3   ALBUMIN 3.7   INR 1.02     Coagulation Profile  Recent Labs   Lab 01/19/24  1740   INR 1.02   PTT 28       5. RADIOLOGY:   Recent Results (from the past 24 hour(s))   IR Visceral Angiogram    Narrative    Procedures: 1/19/2024  1. Ultrasound guidance right common femoral artery access.   2. Selective angiography superior mesentery artery and right hepatic  artery.  3. Superselective angiography of the gastroduodenal artery and a  proximal jejunal artery branch.  4. Embolization of the gastroduodenal artery with Obsidio .  5. Embolization of a proximal jejunal arterial branch with coils.  6. Closure of the right common femoral arteriotomy with 6 Kyrgyz  Angio-Seal.     Clinical History: Rapidly enlarging pseudoaneurysm related to the  gastroduodenal artery with a pancreatic pseudocyst    Comparisons: CT same date     Staff: Darcy Dodge MD    Fellow/Resident: Mitchell Lewis MD.     Monitoring: Patient was placed on continuous monitoring with  intravenous conscious sedation administered by the IR nursing staff  and supervised by the IR attending. Patient remained stable throughout  the procedure.     Medications:  1. Versed IV: 4 mg  2. Fentanyl IV: 325 mcg    Sedation time, face-to-face: 141 minutes    Fluoroscopy time: 51.3 minutes    Complication: Small nontarget embolization into a subsegmental hepatic  arterial branch likely  inconsequential.    Consent: verbal and written informed consent obtained prior to  procedure.    PROCEDURE DETAILS: Patient was placed in supine position on the  angiography table. The right groin was prepped and draped in usual  sterile fashion. Preprocedural fluoroscopy was used to elsy femoral  head. Ultrasound demonstrated a widely patent right common femoral  artery. Under ultrasound guidance, a micropuncture needle was advanced  into the right common femoral artery. An ultrasound image was saved to  document needle placement. This needle was ultimately exchanged using  Seldinger technique for a 5 Uruguayan 10 cm sheath over a 0.035 Bentson  wire. The Bentson wire advanced into the aorta.     A 5 Uruguayan C2 catheter was advanced over the Bentson wire and  attempted cannulation of the superior surgical artery was performed,  ultimately this proved difficult due to the angle of the SMA. The C2  catheter was then exchanged for a SOS 2 catheter which was inserted  over the Bentson wire. This was then used to selectively cannulate the  superior mesenteric artery. Diagnostic angiography was performed which  revealed a replaced right hepatic artery as well as a small  pseudoaneurysm off of a proximal jejunal branch of the superior  mesenteric artery as well as a birds beak appearance at the origin of  the gastroduodenal artery.    Next a progreat microcatheter was inserted along with a fathom wire  was used to cannulate the gastroduodenal artery diagnostic angiography  was performed. From the initial position a small intramural hematoma  within the proximal duodenum was noted. Then pullback angiography was  performed which demonstrated the origin of the segmental branch of the  GDA feeding the known 5 cm pseudoaneurysm. Diagnostic angiography was  performed at this position which did not reveal any obvious outflow of  the pseudoaneurysm.    It was decided to perform embolization from this position initially  within the  pseudoaneurysm itself and then pulling back into the short  segment 8 mm feeding artery off of the GDA. The embolization was  performed with 0.3 cc of Obsidio in a pullback fashion. During  embolization there was a tiny focus of nontargeted embolization which  traveled into the liver which is likely inconsequential. The catheter  was then removed. Next a true select catheter was selected and  inserted through the base catheter and guidewire. The origin of the  GDA was reselected in order to perform postinterventional angiography.  This demonstrated occlusion of the short neck supplying the  pseudoaneurysm as well as occlusion of the portion of the GDA  supplying the intramural duodenal hematoma.    Then attention was turned to the additional smaller pseudoaneurysm  arising from a jejunal artery. The catheter and guidewire and to  select microcatheter was redirected into the SMA and attempts are made  to cannulate the small jejunal branch. This are difficult therefore  the fathom wire was exchanged for a Synchro wire which was ultimately  successful and cannulation. Diagnostic angiography was performed which  demonstrated this is a single feeder vessel to the small  pseudoaneurysm with multiple small outflow vessels. Embolization was  then performed with a Embold 15 cm packing coil with the portion of  the coil within the aneurysm itself in the proximal portion of the  coil including the feeding artery. Postintervention diagnostic  angiography was then performed which demonstrated no residual filling  of the pseudoaneurysm as well as preservation of the proximal  branches.    The microcatheter was then removed and repeat diagnostic angiography  of the superior mesenteric artery was performed which demonstrated no  residual pseudoaneurysm filling. A SimilarWebson wire was then inserted  through the base catheter and this catheter was removed over the  guidewire. The 5 Romanian sheath was then removed and a 6  Honduran  Angio-Seal sheath was inserted over the guidewire, guidewire and inner  dilator removed and 6 Honduran Angio-Seal deployed with excellent  hemostatic result. Sterile bandage applied.      Impression    IMPRESSION:    1. Diagnostic angiography revealed a large pseudoaneurysm off of the  gastroduodenal artery, a small pseudoaneurysm off of a proximal  jejunal artery, and a small intramural hematoma within the duodenum  off of the gastroduodenal artery.  2. Successful embolization of the large pseudoaneurysm off the  gastroduodenal artery, small pseudoaneurysm off of the proximal  jejunal artery, and small intramural hematoma within the duodenum with  excellent post procedural angiographic result.    PLAN:  1. Bedrest for 2 hours with right lower extremity straight.   2. Recommend follow-up CTA to ensure pseudoaneurysm resolution early  next week preferably on January 22 or 23

## 2024-01-20 NOTE — MEDICATION SCRIBE - ADMISSION MEDICATION HISTORY
Medication Scribe Admission Medication History    Admission medication history is complete. The information provided in this note is only as accurate as the sources available at the time of the update.    Information Source(s): Patient and CareEverywhere/SureScripts via in-person    Pertinent Information: None    Changes made to PTA medication list:  Added: None  Deleted: None  Changed: None    Medication Affordability:       Allergies reviewed with patient and updates made in EHR: yes    Medication History Completed By: Ruma Vincent 1/19/2024 6:39 PM    Prior to Admission medications    Medication Sig Last Dose Taking? Auth Provider Long Term End Date   multivitamin w/minerals (MULTI-VITAMIN) tablet Take 1 tablet by mouth daily 1/18/2024 at unknown Yes Reported, Patient

## 2024-01-20 NOTE — H&P
MEDICAL ICU H&P  01/19/2024    Date of Hospital Admission: 1/19/2024  Date of ICU Admission: 1/19/2024  Reason for Critical Care Admission: Pancreatic Pseudoaneurysm  Date of Service (when I saw the patient): 01/19/2024    ASSESSMENT: The patient is a 27yo female with a history of alcohol use disorder and alcohol induced pancreatitis with known pseudocyst who was transferred from [place] for large pancreatic pseudocyst requiring emergent management.    CHANGES and MAJOR THINGS TODAY:   - Pseudocyst embolization with IR today  - Admitted to MICU for overnight monitoring given high risk for bleed    PLAN:    Neuro:  # Pain and sedation  - Tylenol 650mg Q6hr PRN for pain, Oxycodone 5mg q4hr Prn  - Zofran for nausea/vomiting  - RASS goal 0-1    # AUD  Pt with known AUD and alcohol induced pancreatitis. Per pt last drink was 19 days ago with no current concern for withdrawal.  - Consider CIWA    Pulmonary:  No acute issues at this time    Cardiovascular:  # Tachycardia  # HTN  Likely due to acute pain from increasing pancreatic pseudoaneurysm. Pt has known AUD possibly related to withdrawal will plan to place on CIWA protocol and monitor for now. Appears dehydrated on exam will start continuous mIVF  - s/p Pancreatic pseudoaneurysm embolization  - LR 100cc/hr for 10hrs  - Consider CIWA protocol     GI/Nutrition:  # Pancreatic Pseudoaneurysm s/p embolization  Transfer from [facility] due to concern of expanding pancreatic pseudoaneurysm. The patient reports she was told that she had a pancreatic pseudoaneurysm several weeks ago however overnight developed increasing centralized abdominal pain rated at severe in intensity. She was treated locally with morphine and had laboratory studies which were overall initially reassuring. The patient did undergo imaging concerning for above. The provider did reach out to [facility] at the closest facility stating they were inadequately equipped to manage this recommending  transfer. Taken by IR for embolization on 1/19, successfully embolized 2 pseudoaneurysms one 5cm and another smaller 1cm. Patient tolerated procedure well.  - IR following  - Plan for follow up CTA on Monday/Tuesday 9/22-9/23  - Pain control as above    Renal/Fluids/Electrolytes:  No acute issues at this time    Endocrine:  No acute issues at this time    ID:  No acute issues at this time    Hematology:    # Anemia, unspecified  On admission hemoglobin 9.0, MCV 86. Overall unclear etiology and likely multifactorial given age, gender, AUD, and enlarging pseudoanerysms. Will plan to work up and start vitamin supplementation (folic acid, b12). No acute active bleeding or sign of bleed.  - CTM    Musculoskeletal:  # R Groin pain  From recent procedure performed on 1/19, per IR likely to remain sore for couple days. Site appearing clean, dry, no active bleed, no signs of hematoma. Pain control as above.  - CTM    Skin:  No acute issues at this time    General Cares/Prophylaxis:    Fluids: 100cc/hr for 10hr mIVF  Nutrition: Regular per IR  DVT Prophylaxis: Mechanical for now given recent IR intervention, overall low risk  GI Prophylaxis: Not indicated  Restraints: None  Family Communication: Updated at bedside  Code Status: Full Code    Lines/tubes/drains:   None      Disposition:  - Medical ICU overnight for monitoring    Patient seen and findings/plan discussed with medical ICU staff, Dr. Sandeep Tapia.    Jared Stearns MD  PGY-2 Internal Medcine  [phone] [text page]    -----------------------------------------------------------------------    HISTORY PRESENTING ILLNESS:     Rani Lisa is a 28 year old female with a history of AUD complicated by alcoholic pancreatitis with pancreatic cyst formation. Transferred from [facility] due to concern for rapidly expanding pancreatic pseudoaneurysm.     The patient reports she was told that she had a pancreatic cyst several weeks ago following a  hospitalization for alcohol intoxication. Notes that she was told there was nothing to worry about as the cyst was small and not likely to cause any problems. Pt recalls going to [facility] for flu-like symptoms there she had an abdominal US done earlier this month which showed the pseudocyst but was told that it only needed to be monitored. Since then she had a dull pain that was worsening, however, she attributed pain to her perseverating on knowing the cyst was there.     This morning around 3am she awoke with acute abdominal pain and noticed chills and sweats (though she attributed this to the ambient temperature). She went to [place] ED to further investigate as she was worried about her pseudocyst being the culprit. She was treated locally with morphine and had laboratory studies which were overall initially reassuring.  The patient did undergo imaging which again showed an expanding pancreatic pseudocyst with concern for impending hemorrhage. The provider did reach out to [facility] at the closest facility stating they were inadequately equipped to manage this and so they recommended transfer.     Taken by IR for embolization on 1/19, successfully embolized 2 pseudoaneurysms one 5cm and another smaller 1cm. Patient tolerated procedure well.      REVIEW OF SYSTEMS:   10 point ROS with pertinent listed in HPI    PAST MEDICAL HISTORY:   Past Medical History:   Diagnosis Date    ADHD (attention deficit hyperactivity disorder)     Alcohol dependence (H)     Alcoholic pancreatitis     Hyperglycemia     Hypomagnesemia     Hyponatremia     Leukocytosis     Nexplanon insertion      SURGICAL HISTORY:  History reviewed. No pertinent surgical history.  SOCIAL HISTORY: AUD, last alcoholic beverage 19 days ago. Smoker with around 5 cigarettes a day since she was 11. Cannabis use. No other illicit drug use. 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 No pertinent family  history.  ALLERGIES:   Allergies   Allergen Reactions    Bactrim [Sulfamethoxazole-Trimethoprim] Rash     MEDICATIONS:  No current facility-administered medications on file prior to encounter.  multivitamin w/minerals (MULTI-VITAMIN) tablet, Take 1 tablet by mouth daily        PHYSICAL EXAMINATION:  Temp:  [98.4  F (36.9  C)] 98.4  F (36.9  C)  Pulse:  [] 121  Resp:  [11-24] 16  BP: (127-155)/() 148/102  SpO2:  [93 %-99 %] 96 %  No intake/output data recorded.  General: Adult female, NAD, resting comfortably in bed  HEENT: NC in place with O2 sats >100%  Neuro: A&Ox3, NAD  Pulm/Resp: Clear breath sounds bilaterally without rhonchi, crackles or wheeze, breathing non-labored  CV: Tachycardic rate, regular rhythm   Abdomen: Soft, non-distended, normal bowel sounds throughout, TTP in epigastric region.  Incisions/Skin: R groin entry site clean, dry, small amount of blood visible no active hemorrhage, no hematoma       LABS: Reviewed.   Arterial Blood Gases   No lab results found in last 7 days.  Complete Blood Count   Recent Labs   Lab 01/19/24  1740   WBC 8.8   HGB 9.0*        Basic Metabolic Panel  Recent Labs   Lab 01/19/24  1740      POTASSIUM 4.1   CHLORIDE 105   CO2 23   BUN 8.2   CR 0.60   *     Liver Function Tests  Recent Labs   Lab 01/19/24  1740   AST 17   ALT 12   ALKPHOS 94   BILITOTAL 0.3   ALBUMIN 3.7   INR 1.02     Coagulation Profile  Recent Labs   Lab 01/19/24  1740   INR 1.02   PTT 28       IMAGING:  No results found for this or any previous visit (from the past 24 hour(s)).

## 2024-01-20 NOTE — PROCEDURES
[facility]    Procedure: IR Procedure Note    Date/Time: 1/19/2024 10:04 PM    Performed by: Mitchell Lewis MD  Authorized by: Mitchell Lewis MD  IR Fellow Physician:  Radiology Resident Physician: Mitchell Harley  Other(s) attending procedure: Darcy Dodge      UNIVERSAL PROTOCOL   Site Marked: NA  Prior Images Obtained and Reviewed:  Yes  Required items: Required blood products, implants, devices and special equipment available    Patient identity confirmed:  Verbally with patient, arm band, provided demographic data and hospital-assigned identification number  Patient was reevaluated immediately before administering moderate or deep sedation or anesthesia  Confirmation Checklist:  Patient's identity using two indicators, relevant allergies, procedure was appropriate and matched the consent or emergent situation and correct equipment/implants were available  Time out: Immediately prior to the procedure a time out was called    Universal Protocol: the Joint Commission Universal Protocol was followed    Preparation: Patient was prepped and draped in usual sterile fashion       ANESTHESIA    Anesthesia:  Local infiltration  Local Anesthetic:  Lidocaine 1% without epinephrine      SEDATION  Patient Sedated: Yes    Sedation Type:  Moderate (conscious) sedation  Sedation:  Midazolam and fentanyl  Vital signs: Vital signs monitored during sedation    See dictated procedure note for full details.  Findings: Successful embolization of two abdominal pseudoaneurysms     Specimens: none    Complications: None    Condition: Stable    Plan: 2 hours bedrest with right leg straight  CTA early next week to follow up for resolution      PROCEDURE  Describe Procedure: Successful embolization of two abdominal pseudoaneurysms   Patient Tolerance:  Patient tolerated the procedure well with no immediate complications  Length of time physician/provider present for 1:1 monitoring during  sedation: 165

## 2024-01-20 NOTE — PROGRESS NOTES
INTERVENTIONAL RADIOLOGY CONSULTATION    HPI: Patient transferred without an inpatient bed for this patient. [facility] patient  called, and the reported communication to the transferring institution was [facility] was not accepting due to no available bed. However, patient was transferred to [facility] ED. PPM report was communicated to [facility] ED staff. IR called PPM again, explaining the patient was somehow transferred to [facility] ED and there is urgent need for embolization of pancreatic bed pseudoaneurysm due to bleeding risk. IR happy to perform embolization, but cannot perform the procedure without destination bed for this patient for safe post-procedural recovery. Now, PPM is contacting [facility] ICU for possible available bed, but she reports there are competing patients. PPM will update IR asap.    Darcy Dodge MD  Interventional Radiology   Pager [phone]

## 2024-01-20 NOTE — PROGRESS NOTES
[facility]    Progress Note - Medicine Service, MAROON TEAM 3       Date of Admission:  1/19/2024    Assessment & Plan   Rani Lisa is a 28 year old female admitted on 1/19/2024 with a history of alcohol use disorder, genital warts, ADHD, alcohol induced pancreatitis, who was transferred from [place] for 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  She was transferred to floor for the ongoing management.    # Pancreatic Pseudoaneurysm s/p embolization  # Recent pancreatitis  # Peripancreatic fluid collections  Patient presented to outside hospital with acute abdominal pain and nausea. CT AP at OSH notable for Expanding pseudoaneurysms and persistent peripancreatic multifocal fluid collections of 3.2 cm and 16.1 cm pancreatic tail collection causing effacement of left kidney. She was transferred from [facility] due to concern of expanding pancreatic pseudoaneurysm. Taken to IR for embolization on 1/19, successfully embolized 2 pseudoaneurysms (gastroduodenal artery and proximal jejunal artery) one 5cm and another smaller 1cm. Patient tolerated procedure well.  This a.m., patient endorses acute abdominal pain diffuse but more on right side, relieved with Dilaudid.  On examination, abdomen diffusely tender.  No guarding. Likely postprocedural pain.  - IR following  - IV maintenance fluids (LR) @100 mL/h  - Pain control    -Tylenol 650 mg every 4 hours    -Dilaudid 0.2-0.4 mg IV every 3 hours as needed   -Oxycodone 5 mg p.o. every 4 hours as needed  -Nausea and vomiting  - 4 mg Zofran PO or IV Q6 hours PRN  - IV compazine 5-10 mg Q6 hours PRN  - Regular diet  - Plan for follow up CTA on Monday 1/22/2023    # R Groin pain  From recent procedure performed on 1/19, per IR likely to remain sore for couple days. Site appearing clean, dry, no active bleed, no signs of hematoma. Pain control as  above.  - CTM    # AUD  Pt with known AUD and alcohol induced pancreatitis. Per pt last drink was 19 days ago with no current concern for withdrawal. Patient is tachycardic likely secondary to abdominal pain and dehydration but no other signs of possible ETOH withdrawal.  Also offered addiction medicine consult to patient.  However, at this point of time, patient is not interested.  -Folic acid tablet 1 mg daily  -Thiamine 100 mg once daily    # HTN  # Tachycardia  Patient was tachycardic ever since admission.  Heart rate 100-120.  Associated with hypertension with SBPs of 130s-150s range which is possibly d/t acute pain from the procedure.  Pt has known AUD, reports she has been sober recently (19 days). Also recently had pancreatitis.   - IV maintenance fluids as above  - Encourage p.o. intake  - Low concern for alcohol withdrawal as she is very past the timeline    # Hypomagnesemia  - RN Magnesium and Potassium protocols in place  - Follow CMP    # Anemia, unspecified  On admission hemoglobin 9.0, MCV 86. Overall unclear etiology and likely multifactorial given age, gender, AUD, and enlarging pseudoanerysms. No acute active bleeding or sign of bleed.  - CTM  - Follow hemoglobins Q6 hours x 4 checks today     General Cares/Prophylaxis:    Fluids: 100cc/hr LR  Nutrition: Regular per IR  DVT Prophylaxis: Mechanical for now given recent IR intervention, overall low risk  GI Prophylaxis: Not indicated  Restraints: None  Code Status: Full Code      Clinically Significant Risk Factors Present on Admission            # Hypomagnesemia: Lowest Mg = 1.6 mg/dL in last 2 days, will replace as needed                       Disposition Plan    Home      The patient's care was discussed with the Attending Physician, Dr. Newell .    Cruz Small MD, PGY-1  Medicine Service, [facility] TEAM [address] [place] [address]  Securely message with KienVe (more info)  Text page via [employer] Paging/Directory    See signed in provider for up to date coverage information  ______________________________________________________________________    Interval History   Nurses note reviewed.  Patient was recently transferred from ICU to the medicine floor.  Patient endorses abdominal pain which is relieved with  Dilaudid.  Also endorses some nausea.  Other than that, she had no complaints.    Physical Exam   Vital Signs: Temp: 98.1  F (36.7  C) Temp src: Oral BP: (!) 155/117 Pulse: 119   Resp: 15 SpO2: 95 % O2 Device: None (Room air) Oxygen Delivery: 2 LPM  Weight: 123 lbs 7.32 oz    GEN: Ill but non toxic appearing. Lying on bed. She was tearful because of her pain.  EYES: PERRL, Anicteric sclera.   HEENT:  Normocephalic, atraumatic  CV: Tachycardic with regular rhythm.   PULM/CHEST: Clear breath sounds bilaterally without rhonchi, crackles or wheeze  GI: normal bowel sounds, soft, TTP in epigastric region  : voids spontaneously  EXTREMITIES: No peripheral edema, pulses easily palpable  NEURO: No focal deficits. Sensation intact. Strength and movement equal bilaterally.   SKIN: right groin site soft, C/D/I. No hematoma    Medical Decision Making       Please see A&P for additional details of medical decision making.      Data   ------------------------- PAST 24 HR DATA REVIEWED -----------------------------------------------    I have personally reviewed the following data over the past 24 hrs:    7.9  \   8.2 (L)   / 418     134 (L) 100 6.2 /  116 (H)   4.1 22 0.66 \     ALT: <5 AST: 15 AP: 89 TBILI: 0.4   ALB: 3.5 TOT PROTEIN: 6.9 LIPASE: N/A     Procal: N/A CRP: N/A Lactic Acid: 0.7       INR:  1.02 PTT:  28   D-dimer:  N/A Fibrinogen:  N/A     Ferritin:  77 % Retic:  N/A LDH:  N/A       Imaging results reviewed over the past 24 hrs:   Recent Results (from the past 24 hour(s))   IR Visceral Angiogram    Narrative    Procedures: 1/19/2024  1. Ultrasound guidance right common femoral artery access.   2. Selective angiography  superior mesentery artery and right hepatic  artery.  3. Superselective angiography of the gastroduodenal artery and a  proximal jejunal artery branch.  4. Embolization of the gastroduodenal artery with Obsidio .  5. Embolization of a proximal jejunal arterial branch with coils.  6. Closure of the right common femoral arteriotomy with 6 Zimbabwean  Angio-Seal.     Clinical History: Rapidly enlarging pseudoaneurysm related to the  gastroduodenal artery with a pancreatic pseudocyst    Comparisons: CT same date     Staff: Darcy Dodge MD    Fellow/Resident: Mitchell Lewis MD.     Monitoring: Patient was placed on continuous monitoring with  intravenous conscious sedation administered by the IR nursing staff  and supervised by the IR attending. Patient remained stable throughout  the procedure.     Medications:  1. Versed IV: 4 mg  2. Fentanyl IV: 325 mcg    Sedation time, face-to-face: 141 minutes    Fluoroscopy time: 51.3 minutes    Complication: Small nontarget embolization into a subsegmental hepatic  arterial branch likely inconsequential.    Consent: verbal and written informed consent obtained prior to  procedure.    PROCEDURE DETAILS: Patient was placed in supine position on the  angiography table. The right groin was prepped and draped in usual  sterile fashion. Preprocedural fluoroscopy was used to elsy femoral  head. Ultrasound demonstrated a widely patent right common femoral  artery. Under ultrasound guidance, a micropuncture needle was advanced  into the right common femoral artery. An ultrasound image was saved to  document needle placement. This needle was ultimately exchanged using  Seldinger technique for a 5 Zimbabwean 10 cm sheath over a 0.035 Bentson  wire. The Bentson wire advanced into the aorta.     A 5 Zimbabwean C2 catheter was advanced over the Bentson wire and  attempted cannulation of the superior surgical artery was performed,  ultimately this proved difficult due to the angle of the SMA. The  C2  catheter was then exchanged for a SOS 2 catheter which was inserted  over the Bentson wire. This was then used to selectively cannulate the  superior mesenteric artery. Diagnostic angiography was performed which  revealed a replaced right hepatic artery as well as a small  pseudoaneurysm off of a proximal jejunal branch of the superior  mesenteric artery as well as a birds beak appearance at the origin of  the gastroduodenal artery.    Next a progreat microcatheter was inserted along with a fathom wire  was used to cannulate the gastroduodenal artery diagnostic angiography  was performed. From the initial position a small intramural hematoma  within the proximal duodenum was noted. Then pullback angiography was  performed which demonstrated the origin of the segmental branch of the  GDA feeding the known 5 cm pseudoaneurysm. Diagnostic angiography was  performed at this position which did not reveal any obvious outflow of  the pseudoaneurysm.    It was decided to perform embolization from this position initially  within the pseudoaneurysm itself and then pulling back into the short  segment 8 mm feeding artery off of the GDA. The embolization was  performed with 0.3 cc of Obsidio in a pullback fashion. During  embolization there was a tiny focus of nontargeted embolization which  traveled into the liver which is likely inconsequential. The catheter  was then removed. Next a true select catheter was selected and  inserted through the base catheter and guidewire. The origin of the  GDA was reselected in order to perform postinterventional angiography.  This demonstrated occlusion of the short neck supplying the  pseudoaneurysm as well as occlusion of the portion of the GDA  supplying the intramural duodenal hematoma.    Then attention was turned to the additional smaller pseudoaneurysm  arising from a jejunal artery. The catheter and guidewire and to  select microcatheter was redirected into the SMA and attempts  are made  to cannulate the small jejunal branch. This are difficult therefore  the fathom wire was exchanged for a Synchro wire which was ultimately  successful and cannulation. Diagnostic angiography was performed which  demonstrated this is a single feeder vessel to the small  pseudoaneurysm with multiple small outflow vessels. Embolization was  then performed with a Embold 15 cm packing coil with the portion of  the coil within the aneurysm itself in the proximal portion of the  coil including the feeding artery. Postintervention diagnostic  angiography was then performed which demonstrated no residual filling  of the pseudoaneurysm as well as preservation of the proximal  branches.    The microcatheter was then removed and repeat diagnostic angiography  of the superior mesenteric artery was performed which demonstrated no  residual pseudoaneurysm filling. A MindSet Rx wire was then inserted  through the base catheter and this catheter was removed over the  guidewire. The 5 Bahamian sheath was then removed and a 6 Bahamian  Angio-Seal sheath was inserted over the guidewire, guidewire and inner  dilator removed and 6 Bahamian Angio-Seal deployed with excellent  hemostatic result. Sterile bandage applied.      Impression    IMPRESSION:    1. Diagnostic angiography revealed a large pseudoaneurysm off of the  gastroduodenal artery, a small pseudoaneurysm off of a proximal  jejunal artery, and a small intramural hematoma within the duodenum  off of the gastroduodenal artery.  2. Successful embolization of the large pseudoaneurysm off the  gastroduodenal artery, small pseudoaneurysm off of the proximal  jejunal artery, and small intramural hematoma within the duodenum with  excellent post procedural angiographic result.    PLAN:  1. Bedrest for 2 hours with right lower extremity straight.   2. Recommend follow-up CTA to ensure pseudoaneurysm resolution early  next week preferably on January 22 or 23

## 2024-01-20 NOTE — CONSULTS
INTERVENTIONAL RADIOLOGY CONSULT NOTE    Reason for referral:   Pancreatic pseudoaneurysm in the setting of pancreatitis    History:   Patient transferred from [place] due to pseudoaneurysm located within the pancreatic parenchyma related to prior pancreatitis. Patient is int the ED without bed available yet.     Labs:  Lab Results   Component Value Date    HGB 9.0 01/19/2024     Lab Results   Component Value Date     01/19/2024     Lab Results   Component Value Date    WBC 8.8 01/19/2024       Lab Results   Component Value Date    INR 1.02 01/19/2024         Imaging:   Outside ct 1/19/2024 shows large pseudoaneurysm        Assessment:   Patient transferred from [place] due to pseudoaneurysm located within the pancreatic parenchyma related to prior pancreatitis. Patient is at high risk for rupture due to large size.    Plan:   - NPO  - Admit to intermediate care/ICU   - IR planning for urgent angio pending bed placement    Mitchell Lewis MD    Case discussed with Darcy Dodge MD    I reviewed all pertinent data and agree with the assessment and plan documented by Dr. Lewis.    Darcy Dodge MD  Interventional Radiology   Pager [phone]

## 2024-01-20 NOTE — PRE-PROCEDURE
GENERAL PRE-PROCEDURE:   Procedure:  Visceral embolization  Date/Time:  1/19/2024 7:29 PM    Verbal consent obtained?: Yes    Written consent obtained?: Yes    Risks and benefits: Risks, benefits and alternatives were discussed    Consent given by:  Patient  Patient states understanding of procedure being performed: Yes    Patient's understanding of procedure matches consent: Yes    Procedure consent matches procedure scheduled: Yes    Expected level of sedation:  Moderate  Appropriately NPO:  Yes  ASA Class:  2  Mallampati  :  Grade 1- soft palate, uvula, tonsillar pillars, and posterior pharyngeal wall visible  Lungs:  Lungs clear with good breath sounds bilaterally  Heart:  Normal heart sounds and rate  History & Physical reviewed:  History and physical reviewed and no updates needed  Statement of review:  I have reviewed the lab findings, diagnostic data, medications, and the plan for sedation

## 2024-01-20 NOTE — PROGRESS NOTES
Admitted/transferred from: IR @ 2230  Reason for admission/transfer: Close monitoring following IR procedure.   Patient status upon admission/transfer: VSS, A&O on RA. Groin site CDI w/ CMS intact.  Interventions: R Groin site assessed frequently. Remained on bedrest until 0000. Started on MIVF and pain medications given PRN for abdominal pain.  Plan: Continue to monitor groin site and CMS.   2 RN skin assessment: completed by Jyothi CARVER RN and Geni PAUL RN  Sexual Orientation and Gender Identity (SOGI) smartfom completed: Done/Not Done  Result of skin assessment and interventions/actions: Skin intact ex R groin site puncture (stable, not bleeding, no hematoma present)  Height, weight, drug calc weight: Done  Patient belongings (see Flowsheet - Adult Profile for details): Remains w/ pt. (Rings, cellphone, clothing, shoes, 2 sets of car keys in jacket pocket, $3 cash)  MDRO education (if applicable):

## 2024-01-21 ENCOUNTER — APPOINTMENT (OUTPATIENT)
Dept: GENERAL RADIOLOGY | Facility: CLINIC | Age: 29
DRG: 270 | End: 2024-01-21
Payer: COMMERCIAL

## 2024-01-21 LAB
ALBUMIN SERPL BCG-MCNC: 3.4 G/DL (ref 3.5–5.2)
ALP SERPL-CCNC: 90 U/L (ref 40–150)
ALT SERPL W P-5'-P-CCNC: <5 U/L (ref 0–50)
ANION GAP SERPL CALCULATED.3IONS-SCNC: 12 MMOL/L (ref 7–15)
AST SERPL W P-5'-P-CCNC: 15 U/L (ref 0–45)
BASOPHILS # BLD AUTO: 0 10E3/UL (ref 0–0.2)
BASOPHILS NFR BLD AUTO: 0 %
BILIRUB SERPL-MCNC: 0.3 MG/DL
BUN SERPL-MCNC: 4.9 MG/DL (ref 6–20)
CALCIUM SERPL-MCNC: 9.3 MG/DL (ref 8.6–10)
CHLORIDE SERPL-SCNC: 95 MMOL/L (ref 98–107)
CREAT SERPL-MCNC: 0.56 MG/DL (ref 0.51–0.95)
DEPRECATED HCO3 PLAS-SCNC: 25 MMOL/L (ref 22–29)
EGFRCR SERPLBLD CKD-EPI 2021: >90 ML/MIN/1.73M2
EOSINOPHIL # BLD AUTO: 0 10E3/UL (ref 0–0.7)
EOSINOPHIL NFR BLD AUTO: 0 %
ERYTHROCYTE [DISTWIDTH] IN BLOOD BY AUTOMATED COUNT: 17.1 % (ref 10–15)
GLUCOSE SERPL-MCNC: 138 MG/DL (ref 70–99)
HCT VFR BLD AUTO: 29.7 % (ref 35–47)
HCT VFR BLD AUTO: 29.9 % (ref 35–47)
HGB BLD-MCNC: 8.9 G/DL (ref 11.7–15.7)
HGB BLD-MCNC: 9.1 G/DL (ref 11.7–15.7)
HGB BLD-MCNC: 9.2 G/DL (ref 11.7–15.7)
IMM GRANULOCYTES # BLD: 0.1 10E3/UL
IMM GRANULOCYTES NFR BLD: 1 %
LIPASE SERPL-CCNC: 165 U/L (ref 13–60)
LYMPHOCYTES # BLD AUTO: 0.6 10E3/UL (ref 0.8–5.3)
LYMPHOCYTES NFR BLD AUTO: 6 %
MAGNESIUM SERPL-MCNC: 1.7 MG/DL (ref 1.7–2.3)
MCH RBC QN AUTO: 26.1 PG (ref 26.5–33)
MCHC RBC AUTO-ENTMCNC: 30.4 G/DL (ref 31.5–36.5)
MCV RBC AUTO: 86 FL (ref 78–100)
MONOCYTES # BLD AUTO: 0.6 10E3/UL (ref 0–1.3)
MONOCYTES NFR BLD AUTO: 6 %
NEUTROPHILS # BLD AUTO: 9.2 10E3/UL (ref 1.6–8.3)
NEUTROPHILS NFR BLD AUTO: 87 %
NRBC # BLD AUTO: 0 10E3/UL
NRBC BLD AUTO-RTO: 0 /100
PLATELET # BLD AUTO: 410 10E3/UL (ref 150–450)
POTASSIUM SERPL-SCNC: 4.4 MMOL/L (ref 3.4–5.3)
PROT SERPL-MCNC: 7 G/DL (ref 6.4–8.3)
RBC # BLD AUTO: 3.49 10E6/UL (ref 3.8–5.2)
SODIUM SERPL-SCNC: 132 MMOL/L (ref 135–145)
WBC # BLD AUTO: 10.6 10E3/UL (ref 4–11)

## 2024-01-21 PROCEDURE — 999N000128 HC STATISTIC PERIPHERAL IV START W/O US GUIDANCE

## 2024-01-21 PROCEDURE — 74018 RADEX ABDOMEN 1 VIEW: CPT

## 2024-01-21 PROCEDURE — 258N000003 HC RX IP 258 OP 636

## 2024-01-21 PROCEDURE — 99233 SBSQ HOSP IP/OBS HIGH 50: CPT | Mod: GC | Performed by: STUDENT IN AN ORGANIZED HEALTH CARE EDUCATION/TRAINING PROGRAM

## 2024-01-21 PROCEDURE — 258N000003 HC RX IP 258 OP 636: Performed by: STUDENT IN AN ORGANIZED HEALTH CARE EDUCATION/TRAINING PROGRAM

## 2024-01-21 PROCEDURE — 83735 ASSAY OF MAGNESIUM: CPT | Performed by: SURGERY

## 2024-01-21 PROCEDURE — 120N000002 HC R&B MED SURG/OB UMMC

## 2024-01-21 PROCEDURE — 250N000011 HC RX IP 250 OP 636

## 2024-01-21 PROCEDURE — 85004 AUTOMATED DIFF WBC COUNT: CPT

## 2024-01-21 PROCEDURE — 74018 RADEX ABDOMEN 1 VIEW: CPT | Mod: 26 | Performed by: RADIOLOGY

## 2024-01-21 PROCEDURE — 250N000013 HC RX MED GY IP 250 OP 250 PS 637

## 2024-01-21 PROCEDURE — 250N000011 HC RX IP 250 OP 636: Performed by: STUDENT IN AN ORGANIZED HEALTH CARE EDUCATION/TRAINING PROGRAM

## 2024-01-21 PROCEDURE — 36415 COLL VENOUS BLD VENIPUNCTURE: CPT

## 2024-01-21 PROCEDURE — 85014 HEMATOCRIT: CPT

## 2024-01-21 PROCEDURE — 80053 COMPREHEN METABOLIC PANEL: CPT

## 2024-01-21 PROCEDURE — 83690 ASSAY OF LIPASE: CPT

## 2024-01-21 PROCEDURE — 85018 HEMOGLOBIN: CPT

## 2024-01-21 RX ORDER — HYDROMORPHONE HCL IN WATER/PF 6 MG/30 ML
0.2 PATIENT CONTROLLED ANALGESIA SYRINGE INTRAVENOUS ONCE
Status: COMPLETED | OUTPATIENT
Start: 2024-01-21 | End: 2024-01-21

## 2024-01-21 RX ORDER — MAGNESIUM SULFATE HEPTAHYDRATE 40 MG/ML
2 INJECTION, SOLUTION INTRAVENOUS ONCE
Status: COMPLETED | OUTPATIENT
Start: 2024-01-21 | End: 2024-01-21

## 2024-01-21 RX ORDER — HYDROMORPHONE HYDROCHLORIDE 1 MG/ML
0.5 INJECTION, SOLUTION INTRAMUSCULAR; INTRAVENOUS; SUBCUTANEOUS
Status: DISCONTINUED | OUTPATIENT
Start: 2024-01-21 | End: 2024-01-22

## 2024-01-21 RX ORDER — METHOCARBAMOL 100 MG/ML
500 INJECTION, SOLUTION INTRAMUSCULAR; INTRAVENOUS EVERY 8 HOURS PRN
Status: DISPENSED | OUTPATIENT
Start: 2024-01-21 | End: 2024-01-24

## 2024-01-21 RX ORDER — SODIUM CHLORIDE, SODIUM LACTATE, POTASSIUM CHLORIDE, CALCIUM CHLORIDE 600; 310; 30; 20 MG/100ML; MG/100ML; MG/100ML; MG/100ML
INJECTION, SOLUTION INTRAVENOUS CONTINUOUS
Status: DISCONTINUED | OUTPATIENT
Start: 2024-01-21 | End: 2024-01-22

## 2024-01-21 RX ADMIN — SODIUM CHLORIDE, POTASSIUM CHLORIDE, SODIUM LACTATE AND CALCIUM CHLORIDE: 600; 310; 30; 20 INJECTION, SOLUTION INTRAVENOUS at 00:19

## 2024-01-21 RX ADMIN — ONDANSETRON 4 MG: 2 INJECTION INTRAMUSCULAR; INTRAVENOUS at 02:13

## 2024-01-21 RX ADMIN — HYDROMORPHONE HYDROCHLORIDE 0.5 MG: 1 INJECTION, SOLUTION INTRAMUSCULAR; INTRAVENOUS; SUBCUTANEOUS at 08:54

## 2024-01-21 RX ADMIN — PROCHLORPERAZINE EDISYLATE 10 MG: 5 INJECTION INTRAMUSCULAR; INTRAVENOUS at 20:15

## 2024-01-21 RX ADMIN — OXYCODONE HYDROCHLORIDE 5 MG: 5 TABLET ORAL at 19:02

## 2024-01-21 RX ADMIN — HYDROMORPHONE HYDROCHLORIDE 0.2 MG: 0.2 INJECTION, SOLUTION INTRAMUSCULAR; INTRAVENOUS; SUBCUTANEOUS at 05:46

## 2024-01-21 RX ADMIN — ACETAMINOPHEN 650 MG: 325 TABLET, FILM COATED ORAL at 03:20

## 2024-01-21 RX ADMIN — OXYCODONE HYDROCHLORIDE 5 MG: 5 TABLET ORAL at 03:20

## 2024-01-21 RX ADMIN — METHOCARBAMOL 500 MG: 100 INJECTION, SOLUTION INTRAMUSCULAR; INTRAVENOUS at 07:01

## 2024-01-21 RX ADMIN — PROCHLORPERAZINE EDISYLATE 10 MG: 5 INJECTION INTRAMUSCULAR; INTRAVENOUS at 05:05

## 2024-01-21 RX ADMIN — SODIUM CHLORIDE, POTASSIUM CHLORIDE, SODIUM LACTATE AND CALCIUM CHLORIDE: 600; 310; 30; 20 INJECTION, SOLUTION INTRAVENOUS at 23:23

## 2024-01-21 RX ADMIN — ACETAMINOPHEN 650 MG: 325 TABLET, FILM COATED ORAL at 11:13

## 2024-01-21 RX ADMIN — SODIUM CHLORIDE, POTASSIUM CHLORIDE, SODIUM LACTATE AND CALCIUM CHLORIDE 1000 ML: 600; 310; 30; 20 INJECTION, SOLUTION INTRAVENOUS at 10:00

## 2024-01-21 RX ADMIN — ONDANSETRON 4 MG: 2 INJECTION INTRAMUSCULAR; INTRAVENOUS at 16:49

## 2024-01-21 RX ADMIN — OXYCODONE HYDROCHLORIDE 5 MG: 5 TABLET ORAL at 11:13

## 2024-01-21 RX ADMIN — SENNOSIDES AND DOCUSATE SODIUM 1 TABLET: 50; 8.6 TABLET ORAL at 15:07

## 2024-01-21 RX ADMIN — HYDROMORPHONE HYDROCHLORIDE 0.5 MG: 1 INJECTION, SOLUTION INTRAMUSCULAR; INTRAVENOUS; SUBCUTANEOUS at 16:49

## 2024-01-21 RX ADMIN — HYDROMORPHONE HYDROCHLORIDE 0.4 MG: 0.2 INJECTION, SOLUTION INTRAMUSCULAR; INTRAVENOUS; SUBCUTANEOUS at 05:04

## 2024-01-21 RX ADMIN — METHOCARBAMOL 500 MG: 100 INJECTION, SOLUTION INTRAMUSCULAR; INTRAVENOUS at 16:02

## 2024-01-21 RX ADMIN — ONDANSETRON 4 MG: 2 INJECTION INTRAMUSCULAR; INTRAVENOUS at 23:12

## 2024-01-21 RX ADMIN — HYDROMORPHONE HYDROCHLORIDE 0.5 MG: 1 INJECTION, SOLUTION INTRAMUSCULAR; INTRAVENOUS; SUBCUTANEOUS at 20:12

## 2024-01-21 RX ADMIN — NICOTINE 1 PATCH: 7 PATCH, EXTENDED RELEASE TRANSDERMAL at 11:13

## 2024-01-21 RX ADMIN — PROCHLORPERAZINE EDISYLATE 10 MG: 5 INJECTION INTRAMUSCULAR; INTRAVENOUS at 14:48

## 2024-01-21 RX ADMIN — ACETAMINOPHEN 650 MG: 325 TABLET, FILM COATED ORAL at 00:13

## 2024-01-21 RX ADMIN — SODIUM CHLORIDE, POTASSIUM CHLORIDE, SODIUM LACTATE AND CALCIUM CHLORIDE: 600; 310; 30; 20 INJECTION, SOLUTION INTRAVENOUS at 12:54

## 2024-01-21 RX ADMIN — HYDROMORPHONE HYDROCHLORIDE 0.5 MG: 1 INJECTION, SOLUTION INTRAMUSCULAR; INTRAVENOUS; SUBCUTANEOUS at 12:52

## 2024-01-21 RX ADMIN — MAGNESIUM SULFATE HEPTAHYDRATE 2 G: 40 INJECTION, SOLUTION INTRAVENOUS at 19:03

## 2024-01-21 RX ADMIN — HYDROMORPHONE HYDROCHLORIDE 0.5 MG: 1 INJECTION, SOLUTION INTRAMUSCULAR; INTRAVENOUS; SUBCUTANEOUS at 23:12

## 2024-01-21 RX ADMIN — ACETAMINOPHEN 650 MG: 325 TABLET, FILM COATED ORAL at 15:07

## 2024-01-21 RX ADMIN — Medication 1 TABLET: at 15:07

## 2024-01-21 RX ADMIN — ONDANSETRON 4 MG: 2 INJECTION INTRAMUSCULAR; INTRAVENOUS at 11:14

## 2024-01-21 RX ADMIN — SODIUM CHLORIDE, POTASSIUM CHLORIDE, SODIUM LACTATE AND CALCIUM CHLORIDE: 600; 310; 30; 20 INJECTION, SOLUTION INTRAVENOUS at 14:49

## 2024-01-21 RX ADMIN — HYDROMORPHONE HYDROCHLORIDE 0.4 MG: 0.2 INJECTION, SOLUTION INTRAMUSCULAR; INTRAVENOUS; SUBCUTANEOUS at 00:13

## 2024-01-21 RX ADMIN — OXYCODONE HYDROCHLORIDE 5 MG: 5 TABLET ORAL at 15:07

## 2024-01-21 RX ADMIN — FOLIC ACID 1 MG: 1 TABLET ORAL at 15:08

## 2024-01-21 RX ADMIN — THIAMINE HCL TAB 100 MG 100 MG: 100 TAB at 15:08

## 2024-01-21 ASSESSMENT — ACTIVITIES OF DAILY LIVING (ADL)
ADLS_ACUITY_SCORE: 21
DEPENDENT_IADLS:: INDEPENDENT
ADLS_ACUITY_SCORE: 21

## 2024-01-21 ASSESSMENT — SOCIAL DETERMINANTS OF HEALTH (SDOH)
HOW OFTEN DO YOU ATTENT MEETINGS OF THE CLUB OR ORGANIZATION YOU BELONG TO?: NEVER
DO YOU BELONG TO ANY CLUBS OR ORGANIZATIONS SUCH AS CHURCH GROUPS UNIONS, FRATERNAL OR ATHLETIC GROUPS, OR SCHOOL GROUPS?: NO
HOW OFTEN DO YOU ATTEND CHURCH OR RELIGIOUS SERVICES?: NEVER
HOW OFTEN DO YOU GET TOGETHER WITH FRIENDS OR RELATIVES?: MORE THAN THREE TIMES A WEEK

## 2024-01-21 NOTE — PLAN OF CARE
Goal Outcome Evaluation:      Plan of Care Reviewed With: patient    Overall Patient Progress: no changeOverall Patient Progress: no change    Outcome Evaluation: Pt A&Ox4 with VSS. pain managed with scheduled meds and prn po oxycodone, IV dilaudid and IV robaxan and heatpacks for comfort. pt vomited 3x overnight. IV compazine or zofran was given. LR running at 100mL/hr over night. Abx Xray done, refer to last note.

## 2024-01-21 NOTE — SUMMARY OF CARE
Transferred from:   ICU  Report received from: Adelaida, RN  2 RN skin assessment completed by: Anastasia POON & Jazzy POON      - Findings (add LDA if needed): R groin site CDI  Care plan (primary problem) and education initiated if not already done: Done  MDRO education done if applicable: N/A  Pt informed about policy regarding no IV pumps off unit: Yes  Suction set up in room? Yes  Flu shot ordered? (October-April only): N/A  Detailed Belongings: Wearing wedding & engagement ring. Has shoes, socks, shirt, leggings, jacket, cell phone, keys & $3 cash     Pt transferred from  to  around 1830. BP elevated upon arrival (162/11) & tachy () however pt also reports moderate/ severe abd pain, had jut received PO oxy prior to transfer. C/o nausea- given IV zofran, essential oils, sea bands & gingerale. R PIV running LR at 100.

## 2024-01-21 NOTE — PROGRESS NOTES
[facility]    Progress Note - Medicine Service, [facility] TEAM 3       Date of Admission:  1/19/2024    Assessment & Plan   Rani Lisa is a 28 year old female admitted on 1/19/2024 with a history of alcohol use disorder, genital warts, ADHD, alcohol induced pancreatitis, who was transferred from [place] for 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  She was transferred to floor for the ongoing management.    Updates today:  -X-ray KUB not concerning for bowel obstruction  -Advance diet as tolerated  -IV fluid bolus 1 L   -Maintenance fluids at 100 ml/h  -As needed bowel regimen for possible constipation  -Robaxin as needed 500 mg IV every 8 hours as needed for crampy abdominal pain    # Pancreatic Pseudoaneurysm s/p embolization  # Recent pancreatitis  # Peripancreatic fluid collections  Patient presented to outside hospital with acute abdominal pain and nausea. CT AP at OSH notable for Expanding pseudoaneurysms and persistent peripancreatic multifocal fluid collections of 3.2 cm and 16.1 cm pancreatic tail collection causing effacement of left kidney. She was transferred from [facility] due to concern of expanding pancreatic pseudoaneurysm. Taken to IR for embolization on 1/19, successfully embolized 2 pseudoaneurysms (gastroduodenal artery and proximal jejunal artery) one 5cm and another smaller 1cm. Patient tolerated procedure well.  This a.m., patient endorses acute abdominal pain diffuse but more on right side, relieved with Dilaudid.  On examination, abdomen diffusely tender.  No guarding.  X-ray abdomen not concerning for bowel obstruction on 1/21.  Likely postprocedural pain.  - IR following  - IV maintenance fluids (LR) @100 mL/h  - Pain control    -Tylenol 650 mg every 4 hours    -Dilaudid 0.2-0.5 mg IV every 3 hours as needed   -Oxycodone 5 mg p.o. every 4 hours as  needed   -Robaxin 500 mg IV every 8 hours as needed for crampy abdominal pain  -Nausea and vomiting  - 4 mg Zofran PO or IV Q6 hours PRN  - IV compazine 5-10 mg Q6 hours PRN  -Advance diet as tolerated  -Plan for follow up CTA on Monday 1/22/2023    # R Groin pain  From recent procedure performed on 1/19, per IR likely to remain sore for couple days. Site appearing clean, dry, no active bleed, no signs of hematoma. Pain control as above.  - CTM    # AUD  Pt with known AUD and alcohol induced pancreatitis. Per pt last drink was 19 days ago with no current concern for withdrawal. Patient is tachycardic likely secondary to abdominal pain and dehydration but no other signs of possible ETOH withdrawal.  Also offered addiction medicine consult to patient.  However, at this point of time, patient is not interested.  -Folic acid tablet 1 mg daily  -Thiamine 100 mg once daily    # HTN  # Tachycardia  Patient was tachycardic ever since admission.  Heart rate 100-120.  Associated with hypertension with SBPs of 130s-150s range which is possibly d/t acute pain from the procedure.  Pt has known AUD, reports she has been sober recently (19 days). Also recently had pancreatitis.   - IV maintenance fluids as above  - Encourage p.o. intake  - Low concern for alcohol withdrawal as she is way past the timeline    # Hypomagnesemia  - RN Magnesium and Potassium protocols in place  - Follow CMP    # Anemia, unspecified  On admission hemoglobin 9.0, MCV 86. Overall unclear etiology and likely multifactorial given age, gender, AUD, and enlarging pseudoanerysms. No acute active bleeding or sign of bleed.  - CTM  -Hemoglobin checks every 12 hours     General Cares/Prophylaxis:    Fluids: 100cc/hr LR  Nutrition: Regular per IR  DVT Prophylaxis: Mechanical for now given recent IR intervention, overall low risk  GI Prophylaxis: Not indicated  Restraints: None  Code Status: Full Code      Clinically Significant Risk Factors            #  Hypomagnesemia: Lowest Mg = 1.6 mg/dL in last 2 days, will replace as needed   # Hypoalbuminemia: Lowest albumin = 3.4 g/dL at 1/21/2024  3:31 AM, will monitor as appropriate                # Financial/Environmental Concerns: none         Disposition Plan    Home      The patient's care was discussed with the Attending Physician, Dr. Newell .    Cruz Small MD, PGY-1  Medicine Service, [address]  Securely message with Vocera (more info)  Text page via [facility] Paging/Directory   See signed in provider for up to date coverage information  ______________________________________________________________________    Interval History   Nurses note reviewed.  Overnight, patient has had very bad abdominal pain which is crampy in nature and different from the previous type of pain.  She endorses that the character of pain is different than the previous abdominal pain prior to procedure.  However, pain relieved with Dilaudid.  Endorses an episode of vomiting overnight when she was moving rooms.  Endorses constipation.  Mentions that she is hungry.  Denies chest pain, cough, headache, dizziness, fever, chills, rigors.      Physical Exam   Vital Signs: Temp: 98.5  F (36.9  C) Temp src: Oral BP: 126/77 Pulse: (!) 137   Resp: 16 SpO2: 94 % O2 Device: None (Room air)    Weight: 123 lbs 7.32 oz    GEN: Ill but non toxic appearing. Lying on bed.   EYES: PERRL, Anicteric sclera.   HEENT:  Normocephalic, atraumatic  CV: Tachycardic with regular rhythm.   PULM/CHEST: Clear breath sounds bilaterally without rhonchi, crackles or wheeze  GI: normal bowel sounds, soft, TTP in epigastric region  : voids spontaneously  EXTREMITIES: No peripheral edema, pulses easily palpable  NEURO: No focal deficits. Sensation intact. Strength and movement equal bilaterally.   SKIN: right groin site soft, C/D/I. No hematoma    Medical Decision Making       Please see A&P for additional  details of medical decision making.      Data   ------------------------- PAST 24 HR DATA REVIEWED -----------------------------------------------    I have personally reviewed the following data over the past 24 hrs:    10.6  \   9.2 (L)   / 410     132 (L) 95 (L) 4.9 (L) /  138 (H)   4.4 25 0.56 \     ALT: <5 AST: 15 AP: 90 TBILI: 0.3   ALB: 3.4 (L) TOT PROTEIN: 7.0 LIPASE: 165 (H)       Imaging results reviewed over the past 24 hrs:   Recent Results (from the past 24 hour(s))   XR Abdomen Port 1 View    Narrative    Exam: XR ABDOMEN PORT 1 VIEW, 1/21/2024 10:07 AM    Indication: Please eval for any obstructive gas pattern    Comparison: Abdominal angiogram 1/19/2024    Findings:   Supine AP radiograph of the abdomen. Coiling material in the right  upper quadrant. Nonobstructive bowel gas pattern. Paucity of gas in  the left upper quadrant, nonspecific. No acute osseous abnormalities.      Impression    Impression: Nonobstructive bowel gas pattern. Paucity of gas in the  left upper quadrant may be related to spleen or other soft tissue  abnormality. Correlation with outside imaging if available (no reports  on the electronic medical record at time of dictation).    I have personally reviewed the examination and initial interpretation  and I agree with the findings.    EMELI MONTES MD         SYSTEM ID:  [id number]

## 2024-01-21 NOTE — PROVIDER NOTIFICATION
Vocera provider magdaleno stafford at 0244  Pt expressed pain and tender in abdomen where majority of pain is. Pt has thrown up two times since atmitted to 7C at 2200 and 0200. Most recent emesis she expressed relief but still in pain. Pt has not eaten since she threw up her food at 6pm before she transferred over. Encouraged some crackers and snacks but pt wanted to sleep.    Bryson at 0502  Pt tried eating some saltine crackers and vomited again a good amount, giving pain meds and compazine right now. Could you come assess and do an abdomen exam on her.     Provider came to assessed patient. Pt now NPO, abd xray done, one time dose IV dilaudid added, and IV robaxin added.

## 2024-01-21 NOTE — DISCHARGE INSTRUCTIONS
Substance Use Disorder Resources - [place], [place]  To access chemical dependency treatment, you must have a comprehensive assessment complete or have an updated assessment within 30 days of starting any program. If you have private insurance, contact the customer service number on the back of your insurance card to determine the required steps for accessing services through your insurance provider. Once approved for funding, you can connect with a facility that does the comprehensive assessment. If you wish to schedule an assessment through [facility]'s chemical health intake line, call [phone]. If you wish to schedule an assessment through [facility] call [phone] to speak with a  (5 outpatient  clinics including [facility], [facility], or [facility]). Treatment locations can be discussed with the , known as a Licensed Drug & Alcohol Counselor (LADC). They will send the completed comprehensive assessment to the treatment facility of choice.    Find a treatment facility  Findtreatment.gov    AA Meetings List Online for the [facility]  https://a[place],org/meetings/    Substance Abuse & Mental Health Services Administration (SAMHSA) National Helpline  A free, confidential, 24/7, 365 day-a-year treatment referral and information servicec for individuals and families facing mentla health and/or substance use disorders.   Call 7-831-837-HELP    Suicide & Crisis Lifeline  Text [place] to 834 953 to be connected with free, 24/7 mental health crisis support.    [facility]  Offering telehealth sessions  Services offered include [facility] and Family Consults for children and adults  Ph:  [phone].  Email: [email or web address]  [facility]  Offering telehealth sessions  Ph: [phone]    National [place] (YOBANY)  [place] Contact: [phone]   [facility]'s National Information helpline: (202) 198-  YOBANY  Support Groups via Zoom now available visit the website for dates/times    [facility]  Offering telehealth sessions   Ph: [phone]    [facility]  Offering telehealth sessions  Ph: [phone]    [facility]  Medication management, child and adolescent individual therapy and assessments, and substance use Outpatient services  Ph: [phone]    [facility]  Offering telehealth sessions  Ph: [phone]

## 2024-01-21 NOTE — CONSULTS
Care Management Initial Consult    General Information  Assessment completed with: Patient    Type of CM/SW Visit: Initial Assessment  Primary Care Provider verified and updated as needed: Yes   Readmission within the last 30 days: no previous admission in last 30 days   Reason for Consult: other (see comments) (Hospital transfer)  Advance Care Planning: Advance Care Planning Reviewed: no concerns identified        Communication Assessment  Patient's communication style: spoken language (English or Bilingual)    Hearing Difficulty or Deaf: no   Wear Glasses or Blind: no    Cognitive  Cognitive/Neuro/Behavioral: WDL                      Living Environment:   People in home: spouse, child(chi), dependent     Current living Arrangements: house      Able to return to prior arrangements: yes     Family/Social Support:  Care provided by: self  Provides care for: no one  Marital Status:   Support System:           Description of Support System: Supportive, Involved    Support Assessment: Adequate family and caregiver support    Current Resources:   Patient receiving home care services: No  Community Resources: None  Equipment currently used at home: none  Supplies currently used at home: None    Employment/Financial:  Employment Status: employed full-time, self-employed      Financial Concerns: none   Referral to Financial Worker: No  Does the patient's insurance plan have a 3 day qualifying hospital stay waiver?  No    Lifestyle & Psychosocial Needs:  Social Determinants of Health     Food Insecurity: Low Risk  (1/21/2024)    Food Insecurity     Within the past 12 months, did you worry that your food would run out before you got money to buy more?: No     Within the past 12 months, did the food you bought just not last and you didn t have money to get more?: No   Depression: Not at risk (1/19/2024)    PHQ-2     PHQ-2 Score: 0   Housing Stability: Low Risk  (1/21/2024)    Housing Stability     Do you have  housing? : Yes     Are you worried about losing your housing?: No   Tobacco Use: Not on file   Financial Resource Strain: Low Risk  (1/21/2024)    Financial Resource Strain     Within the past 12 months, have you or your family members you live with been unable to get utilities (heat, electricity) when it was really needed?: No   Alcohol Use: Not on file   Transportation Needs: Low Risk  (1/21/2024)    Transportation Needs     Within the past 12 months, has lack of transportation kept you from medical appointments, getting your medicines, non-medical meetings or appointments, work, or from getting things that you need?: No   Physical Activity: Not on file   Interpersonal Safety: Low Risk  (1/21/2024)    Interpersonal Safety     Do you feel physically and emotionally safe where you currently live?: Yes     Within the past 12 months, have you been hit, slapped, kicked or otherwise physically hurt by someone?: No     Within the past 12 months, have you been humiliated or emotionally abused in other ways by your partner or ex-partner?: No   Stress: Not on file   Social Connections: Moderately Isolated (1/21/2024)    Social Connection and Isolation Panel [NHANES]     Frequency of Communication with Friends and Family: Not on file     Frequency of Social Gatherings with Friends and Family: More than three times a week     Attends Spiritism Services: Never     Active Member of Clubs or Organizations: No     Attends Club or Organization Meetings: Never     Marital Status:      Functional Status:  Prior to admission patient needed assistance:   Dependent ADLs: Independent  Dependent IADLs: Independent  Assesssment of Functional Status: At functional baseline    Mental Health Status:  Mental Health Status: No Current Concerns       Chemical Dependency Status:  Chemical Dependency Status: Current Concern (Alcohol use disorder)  Mental Health Resources: None (offered; resources placed in AVS discharge instructions)           Values/Beliefs:  Spiritual, Cultural Beliefs, Yarsani Practices, Values that affect care: no             Additional Information:  Background: Per H&P, The patient is a 27yo female with a history of alcohol use disorder and alcohol induced pancreatitis with known pseudocyst who was transferred from [place] for large pancreatic pseudocyst requiring emergent management.     Progress: Care management team consulted d/t hospital transfer. Chart reviewed. This writer met with patient at bedside to complete the care management assessment. Writer introduced self and the role of the SW & RNCC. Pt's chart was lacking the majority of demographic details. SW obtained pt's address, PCP (requested set-up with PCP @ [facility] in [place], [place]), emergency contact ( Jesus), and health insurance ([employer] through her 's employer). MICHEAL provided education on advanced care planning and verified that the pt does not want to complete a health care directive at this time.    Rayshawn lives in a house with her  Jesus & two minor children (ages 12 & 7). She is independent at baseline and is self-employed full-time. She denied any use of equipment, supplies, or services at this time. She denied any concerns for lack of basic needs. She denied any mental health concerns. She has alcohol use disorder, but is not interested in resources at this time. She would appreciate assistance setting up a hospital follow-up appointment with her new PCP. Pt's  will provide her discharge ride. MICHEAL provided means of contacting the unit care management team & encouraged pt to reach out with any needs, questions, or concerns.    MICHEAL called registration to update pt's chart with pertinent information.    Plan: Anticipate pt will return home where she is independent. Chemical health resources provided in discharge instructions. /RN Care Coordinator will follow for discharge planning, psychosocial needs,  community resources, and advocacy.  __________________________     COURTNEY Mcguire, [facility]  Weekend Coverage Clinical   Phone: [phone]  Pager: [phone]  [place]  [address]

## 2024-01-21 NOTE — PLAN OF CARE
Goal Outcome Evaluation:      Plan of Care Reviewed With: patient    Overall Patient Progress: no changeOverall Patient Progress: no change    Outcome Evaluation: Care management team consulted d/t hospital transfer. Anticipate pt will return home where she is independent. Other needs TBD. Declined CD resources. SW/RNCC to follow & remain available for discharge planning.  __________________________     COURTNEY Mcguire, [facility]  Clinical   Phone: [phone]  Pager: [phone]  [place]  [facility]-  Social Work & Care Management Department  ________________________________________    Weekend Unit 7C : Pager: [phone]   SEARCHABLE in [facility] SOCIAL WORK     Weekend Unit 7A, 7B, & 7C RN Care Coordinator:  Pager: [phone]  SEARCHABLE in [facility] CARE COORDINATOR

## 2024-01-21 NOTE — PLAN OF CARE
Shift: [id number]    D: See flowsheets for full assessment & vitals    PRNs: IV dilaudid x3, oxy x2, IV robaxin x1, zofran x2, compazine x1  Labs: K 4.4, Mag 1.7, Na 132, Hgb 9.1, WBC 10.6  Running: L PIV infusing LR at 100mL/hr      A/R: Pt c/o ongoing abd pain in bilateral upper quadrants partially relieved w/ scheduled tylenol & multiple PRNs around the clock & heating pad. Also c/o nausea which is worse w/ increased pain, IV antiemetics given as needed, had small emesis in afternoon. NPO in AM then changed to ADAT starting  w/ clears (AM xray not concerning for obstruction), pt tolerated sips of broth, requested to advance to FLD in jozef, waiting on tray at shift change. LBM 1/18, pt denies passing gas, bowel sounds hypoactive, given PRN senna in afternoon, no results yet. 1L LR bolus infused in AM, back to mIVF after. Mag replaced IV     P: Monitor/ treat pain nausea. Hgb checks Q12H. Abd CT tomorrow. Monitor for bowel movement. ADAT    Goal Outcome Evaluation:  Plan of Care Reviewed With: patient  Overall Patient Progress: no change  Outcome Evaluation: Ongoing abd pain requiring around the clock pain meds for adequate control. Continues to c/o nausea partially relieved w/ antiemetics, small emesis x1. LBM 1/18, bowel sounds hypoactive & pt denies passing gas, advanced to [facility] after results of AM abd xray confirmed, poor PO intake, stool softener given

## 2024-01-22 ENCOUNTER — APPOINTMENT (OUTPATIENT)
Dept: CT IMAGING | Facility: CLINIC | Age: 29
DRG: 270 | End: 2024-01-22
Payer: COMMERCIAL

## 2024-01-22 LAB
ANION GAP SERPL CALCULATED.3IONS-SCNC: 11 MMOL/L (ref 7–15)
ATRIAL RATE - MUSE: 115 BPM
BUN SERPL-MCNC: 4.1 MG/DL (ref 6–20)
CALCIUM SERPL-MCNC: 9.2 MG/DL (ref 8.6–10)
CHLORIDE SERPL-SCNC: 97 MMOL/L (ref 98–107)
CREAT SERPL-MCNC: 0.59 MG/DL (ref 0.51–0.95)
DEPRECATED HCO3 PLAS-SCNC: 25 MMOL/L (ref 22–29)
DIASTOLIC BLOOD PRESSURE - MUSE: NORMAL MMHG
EGFRCR SERPLBLD CKD-EPI 2021: >90 ML/MIN/1.73M2
ERYTHROCYTE [DISTWIDTH] IN BLOOD BY AUTOMATED COUNT: 17.2 % (ref 10–15)
GLUCOSE SERPL-MCNC: 118 MG/DL (ref 70–99)
HCT VFR BLD AUTO: 28.7 % (ref 35–47)
HGB BLD-MCNC: 7.3 G/DL (ref 11.7–15.7)
HGB BLD-MCNC: 7.5 G/DL (ref 11.7–15.7)
HGB BLD-MCNC: 8.6 G/DL (ref 11.7–15.7)
HGB BLD-MCNC: 8.6 G/DL (ref 11.7–15.7)
INTERPRETATION ECG - MUSE: NORMAL
MAGNESIUM SERPL-MCNC: 1.9 MG/DL (ref 1.7–2.3)
MCH RBC QN AUTO: 25.4 PG (ref 26.5–33)
MCHC RBC AUTO-ENTMCNC: 30 G/DL (ref 31.5–36.5)
MCV RBC AUTO: 85 FL (ref 78–100)
P AXIS - MUSE: 64 DEGREES
PLATELET # BLD AUTO: 401 10E3/UL (ref 150–450)
POTASSIUM SERPL-SCNC: 3.5 MMOL/L (ref 3.4–5.3)
PR INTERVAL - MUSE: 154 MS
QRS DURATION - MUSE: 76 MS
QT - MUSE: 318 MS
QTC - MUSE: 439 MS
R AXIS - MUSE: 10 DEGREES
RBC # BLD AUTO: 3.39 10E6/UL (ref 3.8–5.2)
SODIUM SERPL-SCNC: 133 MMOL/L (ref 135–145)
SYSTOLIC BLOOD PRESSURE - MUSE: NORMAL MMHG
T AXIS - MUSE: 27 DEGREES
T4 FREE SERPL-MCNC: 1.19 NG/DL (ref 0.9–1.7)
TSH SERPL DL<=0.005 MIU/L-ACNC: 17.5 UIU/ML (ref 0.3–4.2)
VENTRICULAR RATE- MUSE: 115 BPM
WBC # BLD AUTO: 7.2 10E3/UL (ref 4–11)

## 2024-01-22 PROCEDURE — 80048 BASIC METABOLIC PNL TOTAL CA: CPT

## 2024-01-22 PROCEDURE — 250N000011 HC RX IP 250 OP 636

## 2024-01-22 PROCEDURE — 84439 ASSAY OF FREE THYROXINE: CPT

## 2024-01-22 PROCEDURE — 85027 COMPLETE CBC AUTOMATED: CPT

## 2024-01-22 PROCEDURE — 120N000002 HC R&B MED SURG/OB UMMC

## 2024-01-22 PROCEDURE — 250N000013 HC RX MED GY IP 250 OP 250 PS 637

## 2024-01-22 PROCEDURE — 83735 ASSAY OF MAGNESIUM: CPT | Performed by: STUDENT IN AN ORGANIZED HEALTH CARE EDUCATION/TRAINING PROGRAM

## 2024-01-22 PROCEDURE — 258N000003 HC RX IP 258 OP 636: Performed by: STUDENT IN AN ORGANIZED HEALTH CARE EDUCATION/TRAINING PROGRAM

## 2024-01-22 PROCEDURE — 84443 ASSAY THYROID STIM HORMONE: CPT

## 2024-01-22 PROCEDURE — 74174 CTA ABD&PLVS W/CONTRAST: CPT

## 2024-01-22 PROCEDURE — 250N000011 HC RX IP 250 OP 636: Performed by: STUDENT IN AN ORGANIZED HEALTH CARE EDUCATION/TRAINING PROGRAM

## 2024-01-22 PROCEDURE — 85018 HEMOGLOBIN: CPT

## 2024-01-22 PROCEDURE — 74174 CTA ABD&PLVS W/CONTRAST: CPT | Mod: 26 | Performed by: STUDENT IN AN ORGANIZED HEALTH CARE EDUCATION/TRAINING PROGRAM

## 2024-01-22 PROCEDURE — 999N000127 HC STATISTIC PERIPHERAL IV START W US GUIDANCE

## 2024-01-22 PROCEDURE — 99233 SBSQ HOSP IP/OBS HIGH 50: CPT | Mod: GC | Performed by: STUDENT IN AN ORGANIZED HEALTH CARE EDUCATION/TRAINING PROGRAM

## 2024-01-22 PROCEDURE — 36415 COLL VENOUS BLD VENIPUNCTURE: CPT

## 2024-01-22 PROCEDURE — 250N000013 HC RX MED GY IP 250 OP 250 PS 637: Performed by: STUDENT IN AN ORGANIZED HEALTH CARE EDUCATION/TRAINING PROGRAM

## 2024-01-22 RX ORDER — IOPAMIDOL 755 MG/ML
80 INJECTION, SOLUTION INTRAVASCULAR ONCE
Status: COMPLETED | OUTPATIENT
Start: 2024-01-22 | End: 2024-01-22

## 2024-01-22 RX ORDER — IOPAMIDOL 755 MG/ML
80 INJECTION, SOLUTION INTRAVASCULAR ONCE
Status: CANCELLED | OUTPATIENT
Start: 2024-01-22 | End: 2024-01-22

## 2024-01-22 RX ORDER — MAGNESIUM OXIDE 400 MG/1
400 TABLET ORAL EVERY 4 HOURS
Status: COMPLETED | OUTPATIENT
Start: 2024-01-22 | End: 2024-01-22

## 2024-01-22 RX ORDER — HYDROMORPHONE HYDROCHLORIDE 1 MG/ML
0.5 INJECTION, SOLUTION INTRAMUSCULAR; INTRAVENOUS; SUBCUTANEOUS 2 TIMES DAILY PRN
Status: DISCONTINUED | OUTPATIENT
Start: 2024-01-22 | End: 2024-01-23

## 2024-01-22 RX ORDER — POTASSIUM CHLORIDE 750 MG/1
20 TABLET, EXTENDED RELEASE ORAL ONCE
Status: COMPLETED | OUTPATIENT
Start: 2024-01-22 | End: 2024-01-22

## 2024-01-22 RX ORDER — OXYCODONE HYDROCHLORIDE 5 MG/1
5-10 TABLET ORAL EVERY 4 HOURS PRN
Status: DISCONTINUED | OUTPATIENT
Start: 2024-01-22 | End: 2024-01-24 | Stop reason: HOSPADM

## 2024-01-22 RX ADMIN — MAGNESIUM OXIDE TAB 400 MG (241.3 MG ELEMENTAL MG) 400 MG: 400 (241.3 MG) TAB at 11:09

## 2024-01-22 RX ADMIN — THIAMINE HCL TAB 100 MG 100 MG: 100 TAB at 08:20

## 2024-01-22 RX ADMIN — IOPAMIDOL 80 ML: 755 INJECTION, SOLUTION INTRAVENOUS at 12:43

## 2024-01-22 RX ADMIN — HYDROMORPHONE HYDROCHLORIDE 0.5 MG: 1 INJECTION, SOLUTION INTRAMUSCULAR; INTRAVENOUS; SUBCUTANEOUS at 02:22

## 2024-01-22 RX ADMIN — ACETAMINOPHEN 650 MG: 325 TABLET, FILM COATED ORAL at 08:20

## 2024-01-22 RX ADMIN — HYDROMORPHONE HYDROCHLORIDE 0.5 MG: 1 INJECTION, SOLUTION INTRAMUSCULAR; INTRAVENOUS; SUBCUTANEOUS at 09:31

## 2024-01-22 RX ADMIN — ACETAMINOPHEN 650 MG: 325 TABLET, FILM COATED ORAL at 04:06

## 2024-01-22 RX ADMIN — POTASSIUM CHLORIDE 20 MEQ: 750 TABLET, EXTENDED RELEASE ORAL at 11:09

## 2024-01-22 RX ADMIN — HYDROMORPHONE HYDROCHLORIDE 0.5 MG: 1 INJECTION, SOLUTION INTRAMUSCULAR; INTRAVENOUS; SUBCUTANEOUS at 05:35

## 2024-01-22 RX ADMIN — ACETAMINOPHEN 650 MG: 325 TABLET, FILM COATED ORAL at 20:04

## 2024-01-22 RX ADMIN — METHOCARBAMOL 500 MG: 100 INJECTION, SOLUTION INTRAMUSCULAR; INTRAVENOUS at 00:16

## 2024-01-22 RX ADMIN — Medication 1 TABLET: at 08:20

## 2024-01-22 RX ADMIN — NICOTINE 1 PATCH: 7 PATCH, EXTENDED RELEASE TRANSDERMAL at 08:24

## 2024-01-22 RX ADMIN — ACETAMINOPHEN 650 MG: 325 TABLET, FILM COATED ORAL at 00:16

## 2024-01-22 RX ADMIN — OXYCODONE HYDROCHLORIDE 5 MG: 5 TABLET ORAL at 04:06

## 2024-01-22 RX ADMIN — MAGNESIUM OXIDE TAB 400 MG (241.3 MG ELEMENTAL MG) 400 MG: 400 (241.3 MG) TAB at 14:34

## 2024-01-22 RX ADMIN — ACETAMINOPHEN 650 MG: 325 TABLET, FILM COATED ORAL at 11:10

## 2024-01-22 RX ADMIN — OXYCODONE HYDROCHLORIDE 5 MG: 5 TABLET ORAL at 00:16

## 2024-01-22 RX ADMIN — FOLIC ACID 1 MG: 1 TABLET ORAL at 08:20

## 2024-01-22 RX ADMIN — SODIUM CHLORIDE, POTASSIUM CHLORIDE, SODIUM LACTATE AND CALCIUM CHLORIDE: 600; 310; 30; 20 INJECTION, SOLUTION INTRAVENOUS at 09:31

## 2024-01-22 RX ADMIN — ACETAMINOPHEN 650 MG: 325 TABLET, FILM COATED ORAL at 15:57

## 2024-01-22 RX ADMIN — SENNOSIDES AND DOCUSATE SODIUM 2 TABLET: 50; 8.6 TABLET ORAL at 02:21

## 2024-01-22 RX ADMIN — HYDROMORPHONE HYDROCHLORIDE 0.5 MG: 1 INJECTION, SOLUTION INTRAMUSCULAR; INTRAVENOUS; SUBCUTANEOUS at 14:34

## 2024-01-22 ASSESSMENT — ACTIVITIES OF DAILY LIVING (ADL)
ADLS_ACUITY_SCORE: 20
ADLS_ACUITY_SCORE: 21
ADLS_ACUITY_SCORE: 20
ADLS_ACUITY_SCORE: 21
ADLS_ACUITY_SCORE: 20
ADLS_ACUITY_SCORE: 21
ADLS_ACUITY_SCORE: 20
ADLS_ACUITY_SCORE: 21
ADLS_ACUITY_SCORE: 20
ADLS_ACUITY_SCORE: 21
ADLS_ACUITY_SCORE: 21
ADLS_ACUITY_SCORE: 20

## 2024-01-22 NOTE — PLAN OF CARE
Goal Outcome Evaluation:      Plan of Care Reviewed With: patient    Overall Patient Progress: no changeOverall Patient Progress: no change    Outcome Evaluation: Pt A&Ox4 with VSS but tachycardic. pain managed with scheduled meds and prn IV dilaudid, IV robaxan, and po dilaudid. IV zofran and compazine for nausea/vomiting. stool softener given after nausea decreased. LR running at @100mL/ hr. Voiding spontaneously but no BM or gas passed. Pt tolerating FLD but little PO intake. Mother at bedside overnight. Plan is to get an ab x ray today

## 2024-01-22 NOTE — PROGRESS NOTES
[facility]    Progress Note - Medicine Service, MAROON TEAM 3       Date of Admission:  1/19/2024    Assessment & Plan   Rani Lisa is a 28 year old female admitted on 1/19/2024 with a history of alcohol use disorder, genital warts, ADHD, alcohol induced pancreatitis, who was transferred from [place] for 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  She was transferred to floor for the ongoing management.    Updates today:  -CTA pending  -Spaced the dosing of IV Dilaudid as needed  -Increased p.o. oxycodone as needed  -Ordered TSH  -Hgb downtredning, possibly due to dilutional with fluid, but will resume q6 Hgb tongith  -Discontinue maintenance fluids    # Pancreatic Pseudoaneurysm s/p embolization  # Recent pancreatitis  # Peripancreatic fluid collections  Patient with a past medical history of 2 episodes of acute pancreatitis last year, recent pancreatitis 2 weeks ago, presented to outside hospital with acute abdominal pain and nausea. CT AP at OSH notable for Expanding pseudoaneurysms and persistent peripancreatic multifocal fluid collections of 3.2 cm and 16.1 cm pancreatic tail collection causing effacement of left kidney. She was transferred from [facility] due to concern of expanding pancreatic pseudoaneurysm. Taken to IR for embolization on 1/19, successfully embolized 2 pseudoaneurysms (gastroduodenal artery and proximal jejunal artery) one 5cm and another smaller 1cm. Patient tolerated procedure well.   X-ray KUB not concerning for bowel obstruction on 1/21.  Likely postprocedural pain in the setting of pancreatitis.  - IR following  - Pain control    -Tylenol 650 mg every 4 hours    -Dilaudid 0.2-0.5 mg IV every 12 hours as needed   -Oxycodone 5-10 mg p.o. every 4 hours as needed   -Robaxin 500 mg IV every 8 hours as needed for crampy abdominal pain  -Nausea and vomiting  - 4 mg  Zofran PO or IV Q6 hours PRN  - IV compazine 5-10 mg Q6 hours PRN  -Advance diet as tolerated: Regular diet  -CTA pending    # R Groin pain  From recent procedure performed on 1/19. Site appearing clean, dry, no active bleed, no signs of hematoma. Pain control as above.  - CTM    # AUD  Pt with known AUD and alcohol induced pancreatitis. Per pt last drink was 19 days ago with no current concern for withdrawal. Patient is tachycardic likely secondary to abdominal pain and dehydration but no other signs of possible ETOH withdrawal.  Also offered addiction medicine consult to patient.  However, at this point of time, patient is not interested.  -Folic acid tablet 1 mg daily  -Thiamine 100 mg once daily    # HTN-resolved  # Tachycardia  Patient was tachycardic ever since admission. Associated with hypertension with SBPs of 130s-150s range which is possibly d/t acute pain from the procedure.  Hypertension improved during the hospitalization.  However, patient continues to have tachycardia.  Likely in the setting of pain and dehydration.  Will have to follow-up with primary care in the outpatient setting.   - TSH elevated to 17  - Low concern for alcohol withdrawal as she is way past the timeline    # Hypomagnesemia-resolved  - RN Magnesium and Potassium protocols in place  - Follow CMP    # Anemia, unspecified  On admission hemoglobin 9.0, MCV 86. Overall unclear etiology and likely multifactorial given age, gender, AUD, and enlarging pseudoanerysms. No acute active bleeding or sign of bleed.  - CTM       General Cares/Prophylaxis:    Fluids: None  Nutrition: Regular  DVT Prophylaxis: Mechanical for now given recent IR intervention, overall low risk  GI Prophylaxis: Not indicated  Restraints: None  Code Status: Full Code      Clinically Significant Risk Factors              # Hypoalbuminemia: Lowest albumin = 3.4 g/dL at 1/21/2024  3:31 AM, will monitor as appropriate                # Financial/Environmental Concerns:  none         Disposition Plan      Expected Discharge Date: 01/24/2024      Destination: home  Discharge Comments: Dispo: Home      The patient's care was discussed with the Attending Physician, Dr. Newell .    Cruz Small MD, PGY-1  Medicine Service, [facility] TEAM [address] [place] [address]  Securely message with [employer] (more info)  Text page via [employer] Paging/Directory   See signed in provider for up to date coverage information  ______________________________________________________________________    Interval History   Nurses note reviewed.  No acute events overnight.  Patient endorses that she has been feeling much better.  Denies nausea, vomiting.  Endorses pain well-controlled with the pain medication.  Denies chest pain, palpitations, headache, dizziness.      Physical Exam   Vital Signs: Temp: 98.3  F (36.8  C) Temp src: Oral BP: (!) 127/91 Pulse: (!) 127   Resp: 18 SpO2: 97 % O2 Device: None (Room air)    Weight: 129 lbs 12.8 oz    GEN: Looks comfortable.  EYES: PERRL, Anicteric sclera.   HEENT:  Normocephalic, atraumatic  CV: Tachycardic with regular rhythm.   PULM/CHEST: Clear breath sounds bilaterally without rhonchi, crackles or wheeze  GI: normal bowel sounds, soft, TTP in epigastric region  : voids spontaneously  EXTREMITIES: No peripheral edema, pulses easily palpable  NEURO: No focal deficits. Sensation intact. Strength and movement equal bilaterally.   SKIN: right groin site soft, C/D/I. No hematoma    Medical Decision Making       Please see A&P for additional details of medical decision making.      Data   ------------------------- PAST 24 HR DATA REVIEWED -----------------------------------------------    I have personally reviewed the following data over the past 24 hrs:    7.2  \   8.6 (L); 8.6 (L)   / 401     133 (L) 97 (L) 4.1 (L) /  118 (H)   3.5 25 0.59 \       Imaging results reviewed over the past 24 hrs:   No results found for this or any previous  visit (from the past 24 hour(s)).

## 2024-01-23 ENCOUNTER — TELEPHONE (OUTPATIENT)
Dept: GASTROENTEROLOGY | Facility: CLINIC | Age: 29
End: 2024-01-23
Payer: COMMERCIAL

## 2024-01-23 PROBLEM — K86.89 FLUID COLLECTION OF PANCREAS: Status: ACTIVE | Noted: 2024-01-23

## 2024-01-23 PROBLEM — F10.90 ALCOHOL USE DISORDER: Status: ACTIVE | Noted: 2024-01-23

## 2024-01-23 LAB
ANION GAP SERPL CALCULATED.3IONS-SCNC: 9 MMOL/L (ref 7–15)
BUN SERPL-MCNC: 6.4 MG/DL (ref 6–20)
CALCIUM SERPL-MCNC: 8.9 MG/DL (ref 8.6–10)
CHLORIDE SERPL-SCNC: 104 MMOL/L (ref 98–107)
CREAT SERPL-MCNC: 0.63 MG/DL (ref 0.51–0.95)
DEPRECATED HCO3 PLAS-SCNC: 26 MMOL/L (ref 22–29)
EGFRCR SERPLBLD CKD-EPI 2021: >90 ML/MIN/1.73M2
ERYTHROCYTE [DISTWIDTH] IN BLOOD BY AUTOMATED COUNT: 17.2 % (ref 10–15)
GLUCOSE SERPL-MCNC: 107 MG/DL (ref 70–99)
HCT VFR BLD AUTO: 26 % (ref 35–47)
HGB BLD-MCNC: 7.1 G/DL (ref 11.7–15.7)
HGB BLD-MCNC: 7.2 G/DL (ref 11.7–15.7)
HGB BLD-MCNC: 7.9 G/DL (ref 11.7–15.7)
MAGNESIUM SERPL-MCNC: 2 MG/DL (ref 1.7–2.3)
MCH RBC QN AUTO: 26 PG (ref 26.5–33)
MCHC RBC AUTO-ENTMCNC: 30.4 G/DL (ref 31.5–36.5)
MCV RBC AUTO: 86 FL (ref 78–100)
PLATELET # BLD AUTO: 367 10E3/UL (ref 150–450)
POTASSIUM SERPL-SCNC: 3.8 MMOL/L (ref 3.4–5.3)
RBC # BLD AUTO: 3.04 10E6/UL (ref 3.8–5.2)
SODIUM SERPL-SCNC: 139 MMOL/L (ref 135–145)
WBC # BLD AUTO: 4.5 10E3/UL (ref 4–11)

## 2024-01-23 PROCEDURE — 250N000013 HC RX MED GY IP 250 OP 250 PS 637

## 2024-01-23 PROCEDURE — 99233 SBSQ HOSP IP/OBS HIGH 50: CPT | Mod: GC | Performed by: STUDENT IN AN ORGANIZED HEALTH CARE EDUCATION/TRAINING PROGRAM

## 2024-01-23 PROCEDURE — 85018 HEMOGLOBIN: CPT

## 2024-01-23 PROCEDURE — 120N000002 HC R&B MED SURG/OB UMMC

## 2024-01-23 PROCEDURE — 83735 ASSAY OF MAGNESIUM: CPT | Performed by: STUDENT IN AN ORGANIZED HEALTH CARE EDUCATION/TRAINING PROGRAM

## 2024-01-23 PROCEDURE — 36415 COLL VENOUS BLD VENIPUNCTURE: CPT

## 2024-01-23 PROCEDURE — 80048 BASIC METABOLIC PNL TOTAL CA: CPT

## 2024-01-23 PROCEDURE — 85027 COMPLETE CBC AUTOMATED: CPT

## 2024-01-23 RX ORDER — POTASSIUM CHLORIDE 750 MG/1
20 TABLET, EXTENDED RELEASE ORAL ONCE
Status: COMPLETED | OUTPATIENT
Start: 2024-01-23 | End: 2024-01-23

## 2024-01-23 RX ORDER — HYDROMORPHONE HCL IN WATER/PF 6 MG/30 ML
0.2 PATIENT CONTROLLED ANALGESIA SYRINGE INTRAVENOUS 2 TIMES DAILY PRN
Status: DISCONTINUED | OUTPATIENT
Start: 2024-01-23 | End: 2024-01-24 | Stop reason: HOSPADM

## 2024-01-23 RX ORDER — LANOLIN ALCOHOL/MO/W.PET/CERES
100 CREAM (GRAM) TOPICAL DAILY
Qty: 30 TABLET | Refills: 0 | Status: SHIPPED | OUTPATIENT
Start: 2024-01-24

## 2024-01-23 RX ORDER — FOLIC ACID 1 MG/1
1 TABLET ORAL DAILY
Qty: 30 TABLET | Refills: 0 | Status: SHIPPED | OUTPATIENT
Start: 2024-01-24

## 2024-01-23 RX ORDER — MAGNESIUM OXIDE 400 MG/1
400 TABLET ORAL EVERY 4 HOURS
Status: COMPLETED | OUTPATIENT
Start: 2024-01-23 | End: 2024-01-23

## 2024-01-23 RX ADMIN — MAGNESIUM OXIDE TAB 400 MG (241.3 MG ELEMENTAL MG) 400 MG: 400 (241.3 MG) TAB at 09:02

## 2024-01-23 RX ADMIN — MAGNESIUM OXIDE TAB 400 MG (241.3 MG ELEMENTAL MG) 400 MG: 400 (241.3 MG) TAB at 11:10

## 2024-01-23 RX ADMIN — THIAMINE HCL TAB 100 MG 100 MG: 100 TAB at 09:02

## 2024-01-23 RX ADMIN — ACETAMINOPHEN 650 MG: 325 TABLET, FILM COATED ORAL at 00:58

## 2024-01-23 RX ADMIN — ACETAMINOPHEN 650 MG: 325 TABLET, FILM COATED ORAL at 20:39

## 2024-01-23 RX ADMIN — Medication 1 TABLET: at 09:02

## 2024-01-23 RX ADMIN — ACETAMINOPHEN 650 MG: 325 TABLET, FILM COATED ORAL at 09:02

## 2024-01-23 RX ADMIN — POTASSIUM CHLORIDE 20 MEQ: 750 TABLET, EXTENDED RELEASE ORAL at 09:02

## 2024-01-23 RX ADMIN — ACETAMINOPHEN 650 MG: 325 TABLET, FILM COATED ORAL at 11:10

## 2024-01-23 RX ADMIN — NICOTINE 1 PATCH: 7 PATCH, EXTENDED RELEASE TRANSDERMAL at 09:08

## 2024-01-23 RX ADMIN — ACETAMINOPHEN 650 MG: 325 TABLET, FILM COATED ORAL at 03:57

## 2024-01-23 RX ADMIN — ACETAMINOPHEN 650 MG: 325 TABLET, FILM COATED ORAL at 15:29

## 2024-01-23 RX ADMIN — FOLIC ACID 1 MG: 1 TABLET ORAL at 09:02

## 2024-01-23 ASSESSMENT — ACTIVITIES OF DAILY LIVING (ADL)
ADLS_ACUITY_SCORE: 20
ADLS_ACUITY_SCORE: 20
ADLS_ACUITY_SCORE: 23
ADLS_ACUITY_SCORE: 20

## 2024-01-23 NOTE — PLAN OF CARE
Goal Outcome Evaluation: Ongoing, progressing    Plan of Care Reviewed With: patient    Overall Patient Progress: no change    Outcome Evaluation: 28 y.o. female admitted with pancreas artery pseudoaneurysm. No significant changes. Pt is alert and oriented and able to make needs known. Pain managed with tylenol overnight. Continue to monitor.

## 2024-01-23 NOTE — PROGRESS NOTES
[facility]    Progress Note - Medicine Service, MAROON TEAM 3       Date of Admission:  1/19/2024    Assessment & Plan   Rani Lisa is a 28 year old female admitted on 1/19/2024 with a history of alcohol use disorder, genital warts, ADHD, alcohol induced pancreatitis, who was transferred from [place] for 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  She was transferred to floor for the ongoing management.    Updates today:  -CTA notable for Sequela of pancreatitis with large pancreatic pseudocyst and post procedural changes of embolizaton  -Touch base with GI for outpatient referral  -Decrease Dilaudid to 0.2 mg     # Pancreatic Pseudoaneurysm s/p embolization and coiling  # Recurrent pancreatitis c/b multifocal  Peripancreatic fluid collections largest being 16 cm  Patient with a past medical history of 2 episodes of acute pancreatitis last year, recent pancreatitis 2 weeks ago, presented to outside hospital with acute abdominal pain and nausea. CT AP at OSH notable for Expanding pseudoaneurysms and persistent peripancreatic multifocal fluid collections of 3.2 cm and 16.1 cm pancreatic tail collection causing effacement of left kidney. She was transferred from [facility] due to concern of expanding pancreatic pseudoaneurysm. Taken to IR for embolization on 1/19, successfully embolized 2 pseudoaneurysms (gastroduodenal artery and proximal jejunal artery) one 5cm and another smaller 1cm. Patient tolerated procedure well.   X-ray KUB not concerning for bowel obstruction on 1/21. Repeat CTA notable for Sequela of pancreatitis with large pancreatic pseudocyst extending into the gastric fundus and left kidney. Also notable for prominent superior mesenteric and gastrosplenic varices.   - Pain control    -Tylenol 650 mg every 4 hours    -Dilaudid 0.2mg IV every 12 hours as needed   -Oxycodone  5-10 mg p.o. every 4 hours as needed   -Robaxin 500 mg IV every 8 hours as needed for crampy abdominal pain  -Nausea and vomiting-improved  - 4 mg Zofran PO or IV Q6 hours PRN  - IV compazine 5-10 mg Q6 hours PRN  -Advance diet as tolerated: Regular diet    # R Groin pain, resolving  From recent procedure performed on 1/19. Site appearing clean, dry, no active bleed, no signs of hematoma. Pain control as above.  - CTM    # AUD  Pt with known AUD and alcohol induced pancreatitis. Per pt last drink was 19 days ago with no current concern for withdrawal. Patient is tachycardic likely secondary to abdominal pain and dehydration but no other signs of possible ETOH withdrawal.  Also offered addiction medicine consult to patient.  However, Pt is already looking forward to work with addiction team OSH.  -Folic acid tablet 1 mg daily  -Thiamine 100 mg once daily    # HTN-resolved  # Tachycardia-improving   Patient was tachycardic ever since admission, associated with hypertension with SBPs of 130s-150s range which is possibly d/t acute pain from the procedure and dehydration.  Hypertension and tachycardia improved during the hospitalization.      # Hypomagnesemia-resolved  - RN Magnesium and Potassium protocols in place  - Follow CMP    # Anemia, unspecified  On admission hemoglobin 9.0, MCV 86. Overall unclear etiology and likely multifactorial given age, gender, AUD, and enlarging pseudoanerysms. No acute active bleeding or sign of bleed.  -CTM  -Hb checks q12h       General Cares/Prophylaxis:    Fluids: None  Nutrition: Regular  DVT Prophylaxis: Mechanical for now given recent IR intervention, overall low risk  GI Prophylaxis: Not indicated  Restraints: None  Code Status: Full Code      Clinically Significant Risk Factors              # Hypoalbuminemia: Lowest albumin = 3.4 g/dL at 1/21/2024  3:31 AM, will monitor as appropriate                # Financial/Environmental Concerns: none         Disposition Plan      Expected  Discharge Date: 01/23/2024      Destination: home  Discharge Comments: Dispo: Home      The patient's care was discussed with the Attending Physician, Dr. Avalos .    Cruz Small MD, PGY-1  Medicine Service, [facility] TEAM [address] [place] [address]  Securely message with [employer] (more info)  Text page via [facility] Paging/Directory   See signed in provider for up to date coverage information  ______________________________________________________________________    Interval History   Nurses note reviewed.  No acute events overnight.  Patient endorses that she has been feeling much better.  Denies nausea, vomiting.  Endorses pain well-controlled with the pain medication.  Denies chest pain, palpitations, headache, dizziness.  Last BM this AM.    Physical Exam   Vital Signs: Temp: 98.2  F (36.8  C) Temp src: Oral BP: (!) 140/88 Pulse: 96   Resp: 16 SpO2: 98 % O2 Device: None (Room air)    Weight: 129 lbs 12.8 oz    GEN: Looks comfortable.  EYES: PERRL, Anicteric sclera.   HEENT:  Normocephalic, atraumatic  CV: Tachycardic with regular rhythm.   PULM/CHEST: Clear breath sounds bilaterally without rhonchi, crackles or wheeze  GI: normal bowel sounds, soft, TTP in epigastric region  : voids spontaneously  EXTREMITIES: No peripheral edema, pulses easily palpable  NEURO: No focal deficits. Sensation intact. Strength and movement equal bilaterally.   SKIN: right groin site soft, C/D/I. No hematoma    Medical Decision Making       Please see A&P for additional details of medical decision making.      Data   ------------------------- PAST 24 HR DATA REVIEWED -----------------------------------------------    I have personally reviewed the following data over the past 24 hrs:    4.5  \   7.9 (L)   / 367     139 104 6.4 /  107 (H)   3.8 26 0.63 \       Imaging results reviewed over the past 24 hrs:   No results found for this or any previous visit (from the past 24 hour(s)).

## 2024-01-23 NOTE — PLAN OF CARE
Goal Outcome Evaluation:      Plan of Care Reviewed With: patient    Overall Patient Progress: no changeOverall Patient Progress: no change    Outcome Evaluation: A&Ox4. VSS on RA. Pt c/o intermittent abd pain. Administered IV dilaudid & scheduled tylenol. Pt denies nausea. Discontinued MIVF. Abd CT completed. Monitoring hemoglobin. K & mag PO replaced, recheck in AM.

## 2024-01-24 VITALS
BODY MASS INDEX: 21.19 KG/M2 | WEIGHT: 127.2 LBS | OXYGEN SATURATION: 96 % | HEIGHT: 65 IN | SYSTOLIC BLOOD PRESSURE: 128 MMHG | HEART RATE: 90 BPM | DIASTOLIC BLOOD PRESSURE: 81 MMHG | TEMPERATURE: 98.5 F | RESPIRATION RATE: 18 BRPM

## 2024-01-24 LAB
ANION GAP SERPL CALCULATED.3IONS-SCNC: 11 MMOL/L (ref 7–15)
BUN SERPL-MCNC: 6 MG/DL (ref 6–20)
CALCIUM SERPL-MCNC: 8.4 MG/DL (ref 8.6–10)
CHLORIDE SERPL-SCNC: 106 MMOL/L (ref 98–107)
CREAT SERPL-MCNC: 0.6 MG/DL (ref 0.51–0.95)
DEPRECATED HCO3 PLAS-SCNC: 22 MMOL/L (ref 22–29)
EGFRCR SERPLBLD CKD-EPI 2021: >90 ML/MIN/1.73M2
ERYTHROCYTE [DISTWIDTH] IN BLOOD BY AUTOMATED COUNT: 17.3 % (ref 10–15)
GLUCOSE SERPL-MCNC: 126 MG/DL (ref 70–99)
HCT VFR BLD AUTO: 24.2 % (ref 35–47)
HGB BLD-MCNC: 7.3 G/DL (ref 11.7–15.7)
HGB BLD-MCNC: 7.4 G/DL (ref 11.7–15.7)
MAGNESIUM SERPL-MCNC: 1.9 MG/DL (ref 1.7–2.3)
MCH RBC QN AUTO: 25.6 PG (ref 26.5–33)
MCHC RBC AUTO-ENTMCNC: 30.2 G/DL (ref 31.5–36.5)
MCV RBC AUTO: 85 FL (ref 78–100)
PLATELET # BLD AUTO: 362 10E3/UL (ref 150–450)
POTASSIUM SERPL-SCNC: 4.1 MMOL/L (ref 3.4–5.3)
RBC # BLD AUTO: 2.85 10E6/UL (ref 3.8–5.2)
SODIUM SERPL-SCNC: 139 MMOL/L (ref 135–145)
WBC # BLD AUTO: 5.2 10E3/UL (ref 4–11)

## 2024-01-24 PROCEDURE — 83735 ASSAY OF MAGNESIUM: CPT

## 2024-01-24 PROCEDURE — 99239 HOSP IP/OBS DSCHRG MGMT >30: CPT | Mod: GC | Performed by: STUDENT IN AN ORGANIZED HEALTH CARE EDUCATION/TRAINING PROGRAM

## 2024-01-24 PROCEDURE — 85027 COMPLETE CBC AUTOMATED: CPT

## 2024-01-24 PROCEDURE — 250N000013 HC RX MED GY IP 250 OP 250 PS 637

## 2024-01-24 PROCEDURE — 80048 BASIC METABOLIC PNL TOTAL CA: CPT

## 2024-01-24 PROCEDURE — 85018 HEMOGLOBIN: CPT

## 2024-01-24 PROCEDURE — 99223 1ST HOSP IP/OBS HIGH 75: CPT | Mod: GC | Performed by: INTERNAL MEDICINE

## 2024-01-24 PROCEDURE — 36415 COLL VENOUS BLD VENIPUNCTURE: CPT

## 2024-01-24 RX ORDER — MAGNESIUM OXIDE 400 MG/1
400 TABLET ORAL EVERY 4 HOURS
Status: COMPLETED | OUTPATIENT
Start: 2024-01-24 | End: 2024-01-24

## 2024-01-24 RX ADMIN — Medication 5 MG: at 02:34

## 2024-01-24 RX ADMIN — ACETAMINOPHEN 650 MG: 325 TABLET, FILM COATED ORAL at 08:54

## 2024-01-24 RX ADMIN — ACETAMINOPHEN 650 MG: 325 TABLET, FILM COATED ORAL at 01:00

## 2024-01-24 RX ADMIN — MAGNESIUM OXIDE TAB 400 MG (241.3 MG ELEMENTAL MG) 400 MG: 400 (241.3 MG) TAB at 11:21

## 2024-01-24 RX ADMIN — NICOTINE 1 PATCH: 7 PATCH, EXTENDED RELEASE TRANSDERMAL at 09:18

## 2024-01-24 RX ADMIN — Medication 1 TABLET: at 08:55

## 2024-01-24 RX ADMIN — FOLIC ACID 1 MG: 1 TABLET ORAL at 08:55

## 2024-01-24 RX ADMIN — ACETAMINOPHEN 650 MG: 325 TABLET, FILM COATED ORAL at 11:21

## 2024-01-24 RX ADMIN — MAGNESIUM OXIDE TAB 400 MG (241.3 MG ELEMENTAL MG) 400 MG: 400 (241.3 MG) TAB at 08:55

## 2024-01-24 RX ADMIN — THIAMINE HCL TAB 100 MG 100 MG: 100 TAB at 08:55

## 2024-01-24 ASSESSMENT — ACTIVITIES OF DAILY LIVING (ADL)
ADLS_ACUITY_SCORE: 20

## 2024-01-24 NOTE — PROVIDER NOTIFICATION
"Provider notified (name):[facility]  Reason for notification:High, 7C, [facility],S.S rm 7415 Pt can't sleep, states \" doesn't feel right\",P 107, /108, T 97.9 Feels dizzy standing, denies pain/nausea. 7.1 Hgb @ 1907   1-23 Next draw @ 0700.Recommendation/request given to provider:Please advise shayne Ashby RN [phone]  Response from provider:   "

## 2024-01-24 NOTE — PROGRESS NOTES
Care Management Discharge Note    Discharge Date: 01/24/2024       Discharge Disposition: Home    Discharge Services: None    Discharge DME: None    Discharge Transportation: family or friend will provide    Private pay costs discussed: Not applicable    Does the patient's insurance plan have a 3 day qualifying hospital stay waiver?  No    PAS Confirmation Code:  N/A  Patient/family educated on Medicare website which has current facility and service quality ratings: no    Education Provided on the Discharge Plan: Yes  Persons Notified of Discharge Plans: Patient, provider, nursing staff, SW  Patient/Family in Agreement with the Plan: yes    Handoff Referral Completed: Yes    Additional Information:  Patient will discharge home with family providing transportation home. No discharge needs identified.     OPAL Condon  [address]   Office: [phone]  Pager: [phone]  [email or web address]

## 2024-01-24 NOTE — PLAN OF CARE
Goal Outcome Evaluation:      Plan of Care Reviewed With: patient    Overall Patient Progress: no changeOverall Patient Progress: no change    Pt discharged home with mom and . Picking up meds from pharmacy near her. Pt c/o soreness in abd. Administered tylenol. Pulled PIV, went over AVS.

## 2024-01-24 NOTE — PLAN OF CARE
"Goal Outcome Evaluation  POC reviewed with Patient'  Overall Patient Progress: no change   Outcome Evaluation: Patient started shift reporting feeling good; however, she awakened @ 0150 and stated \"I don't feel right,\". VSS except hypertensive and  tachycardic (158/108 and  107). Patient endorsed feeling dizzy upon standing upright.  Denied pain/nausea or sob.  Patient reported feeling better 1/2 hour later and her VSS had resumed WNL.   Paged provider and Hgb rechecked earlier than scheduled which had gone from 7.1 to 7.4 to 7.3. Melatonin given.Gave patient and her mother resources for support/programs/ patient education surrounding CD/AUD,pancreatitis/pseudocyst etc.. Mother here from [place].   Continue to follow POC. Update provider with changes.       "

## 2024-01-24 NOTE — PROGRESS NOTES
Care Management Follow Up     CHW spoke with patient on establishing a new PCP. CHW asked preference questions such as which clinic they preferred along with gender of their new PCP. Patient  did not have any preference and chose [facility] as her P[facility] and was okay with any other provider. CHW attempted to schedule follow-up appointment with provider however, he was unavailable for next week.CHW was able to establish care with Doctor Dodie Lofton at our [place] location  by calling the back door number [phone]. Patient is scheduled for follow up appointment on 2/2 @ 9:40. CHW alerted pt of new PCP and appointment time. CHW updated PCP on the patient's chart.     Francis Montez   [facility] Worker and Sharlene   [facility] 7C

## 2024-01-24 NOTE — CONSULTS
Gastroenterology Consultation  GI Pancreaticobiliary Service    Date of Admission:  1/19/2024  Reason for Admission: Pancreatic pseudoaneurysm   Date of Consult  1/24/2024   Requesting Physician:  Garfield Avalos MD           ASSESSMENT AND RECOMMENDATIONS:   Assessment:  28 year old female with a history of alcohol use disorder, alcohol induced pancreatitis c/b recurrent pancreatitis and pancreatic pseudocyst admitted for management of pancreatic pseudoaneurysm s/p IR embolization of pseudoaneurysms on 1/19. GI consulted for management of large pancreatic pseudocyst (now 16 cm in diameter and involving left kidney and gastric fundus).    # Recurrent pancreatitis c/b multifocal pancreatic pseudocysts with largest pseudocyst 16 cm extending into gastric fundus and left kidney  # Pancreatic pseudoaneurysms of gastroduodenal artery s/p IR embolization on 1/19  Patient has history of chronic, recurrent alcohol-induced pancreatitis, recently found to have a pseudoaneurysm initially of 1.2cm on 1/2/24 with enlargement to 4cm on 1/19/24 with concern for possible hemorrhage or risk of hemorrhage, no s/p IR embolization of gastroduodenal artery and a branch of the jejunal artery. She seems to be recovering well - no pain, fevers, chills, nausea, or vomiting, appetite is very good. No signs or symptoms for need for urgent intervention and draining of pseudocyst at this time - no evidence of gastric outlet obstructions (has good appetite, no nausea or vomiting), no signs of infection, no pain, normal bowel movements, and no biliary obstruction or jaundice. However, given the size of her pseudocyst and varices, she is at high risk for complications. She will need to stop any alcohol consumption and will need to stop smoking to avoid progression and recurrent inflammatory insults to her pancreas. Also, given the size of her pseudocyst, she will likely need intervention to drain the cyst at some point in the near  future if it does not improve on its own. At this time, we recommend that she follow up outpatient in 3-4 weeks with repeat CT AP with subsequent visit in Pancreas clinic to monitor and see if there is any improvement with continued abstinence from alcohol. If pseudocyst is stable or worsening at that time, she would likely need intervention to drain the pseudocyst.      Recommendations:  - will need follow up imaging with repeat CT AP with contrast in 3-4 weeks to monitor pancreatic pseudocysts with follow up with Pancreas clinic afterward to review imaging (GI will coordinate)   - it is imperative that she continue to abstain from alcohol and that she stop smoking to prevent recurrent pancreatitis and potential worsening/rupture of varices  - counseled that with any signs of GI bleeding, she should present immediately to ED  - Okay to discharge from GI perspective      Thank you for involving us in this patient's care. Please do not hesitate to contact the GI service with any questions or concerns.     The patient was discussed and plan agreed upon with Dr. Ortiz, GI Advanced Endoscopy/Pancreaticobiliary Service staff physician.    Ciarra Cotton MD  Internal Medicine Resident, PGY-3         History of Present Illness:   Patient seen and examined at 0915. History is obtained from patient.    Rani Lisa is a 28 year old female with a PMH significant for ADHD, alcohol use disorder, alcohol-induced recurrent pancreatitis, large multiple pancreatic pseudocysts, who presented to [facility] ED for acute onset of nausea, vomiting, and diffuse abdominal pain, and admitted to [facility] for enlarged pancreatic pseudoaneurysm and IR intervention on 1/19, also found to have superior mesenteric and gastrosplenic varices and 16 cm pancreatic pseudocyst.    She was on vacation for one week prior to her most recent hospitalization from 1/2/24-1/4/2024 for a recurrent episode of pancreatitis. She had been drinking while on  vacation, about 1L of vodka per day for at least 7 days. Of note, during her previous hospitalization 1/2-1/4/24 she had a CT AP w contrast on 1/2/2024 that demonstrated a couple of large pancreatic pseudocysts with pancreatic neck cyst of 4.0-3.5cm, a 1.2cm hypodensity that was thought to be a probable pseudoaneurysm, a pancreatic tail fluid collection of 3.1-2.7cm and a large pancreatic tail pseudocyst of 12 x 10cm in size. After her discharge, she reports that she has not had any further alcohol consumption. She does understand the importance of reducing and eliminating her alcohol intake to avoid future complications from her pancreatitis.     She reported some intermittent mild abdominal pain in the left abdomen after her discharge. No nausea, vomiting, or bowel changes at the time of her pain. She thinks she may have just had increased awareness of this area of her abdomen since she became aware of the large pseudocysts and the possible cyst vs pseudoaneurysm and may have been imaging pain or discomfort since she was worried about the cyst.     She woke up at 3 AM on 1/19 with sudden onset of severe diffuse abdominal pain, nausea, and vomiting and presented to ED in [place], [place]. She denied any alcohol use since her discharge on 1/4 for recurrent pancreatitis but did endorse eating high fat meals over the preceding couple of days. Labs demonstrated normal LFTs but elevated lipase at 2687 and CT AP demonstrated dangerously enlarged pancreatic pseudoaneurysm at 4-5 cm and it was recommended she undergo vascular intervention for the pseudoaneurysm, so she was transferred to the [facility] for IR embolization and ongoing monitoring. Embolization was performed on 2 pancreatic pseudoaneurysms by IR on 1/19 (embolization of gastroduodenal artery and the proximal jejunal artery branch). She has been recovering well since then. Of note, CT scan after intervention on 1/22 demonstrated prominent superior mesenteric and  "gastrosplenic varices.    She denies any pain today. No nausea or vomiting. No pain or nausea with eating. Has been eating and drinking well - states that she has been potentially \"enjoying herself too much\" here with the treats that she has been eating and drinking during her admission. Having normal bowel movements, no diarrhea or constipation, no melena, no hematochezia. No fevers or chills.     Reports ongoing tobacco use with smoking 5 cigarettes per day. She has been trying to cut back on smoking, and has made progress with reducing her tobacco use to this amount. Also vapes nicotine as well, which she feels has been helpful in allowing her to cut back on smoking. Is aware that she should continue to cut down and is planning to try to reduce use of both cigarettes and vaping.     Previous Hospitalizations for pancreatitis per chart review (all at OSH):  11/30-12/3/2020 (initial episode of pancreatitis)  6/7-6/10/2021  10/18-10/20/2021  9/9-9/12/2022 1/8-1/11/2023 1/2-1/4/2024    Previous Procedures:  IR embolization of pancreatic pseudoaneurysm 1/19/2024            Past Medical History:   Reviewed and edited as appropriate  Past Medical History:   Diagnosis Date    ADHD (attention deficit hyperactivity disorder)     Alcohol dependence (H)     Alcoholic pancreatitis     Hyperglycemia     Hypomagnesemia     Hyponatremia     Leukocytosis     Nexplanon insertion             Past Surgical History:   Reviewed and edited as appropriate   History reviewed. No pertinent surgical history.           Social History:   The patient lives in [place], [place].  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 No pertinent family history.          Allergies:   Reviewed and edited as appropriate     Allergies   Allergen Reactions    Bactrim [Sulfamethoxazole-Trimethoprim] Rash            Medications:     Current Facility-Administered Medications   Medication    acetaminophen (TYLENOL) tablet 650 mg    calcium carbonate (TUMS) chewable tablet 1,000 mg    folic acid (FOLVITE) tablet 1 mg    HYDROmorphone (DILAUDID) injection 0.2 mg    lidocaine (LMX4) cream    lidocaine 1 % 0.1-1 mL    magnesium oxide (MAG-OX) tablet 400 mg    melatonin tablet 5 mg    multivitamin w/minerals (THERA-VIT-M) tablet 1 tablet    naloxone (NARCAN) injection 0.2 mg    Or    naloxone (NARCAN) injection 0.4 mg    Or    naloxone (NARCAN) injection 0.2 mg    Or    naloxone (NARCAN) injection 0.4 mg    nicotine (NICODERM CQ) 7 MG/24HR 24 hr patch 1 patch    nicotine Patch in Place    ondansetron (ZOFRAN ODT) ODT tab 4 mg    Or    ondansetron (ZOFRAN) injection 4 mg    oxyCODONE (ROXICODONE) tablet 5-10 mg    polyethylene glycol (MIRALAX) Packet 17 g    prochlorperazine (COMPAZINE) injection 5-10 mg    senna-docusate (SENOKOT-S/PERICOLACE) 8.6-50 MG per tablet 1 tablet    Or    senna-docusate (SENOKOT-S/PERICOLACE) 8.6-50 MG per tablet 2 tablet    sodium chloride (PF) 0.9% PF flush 3 mL    sodium chloride (PF) 0.9% PF flush 3 mL             Review of Systems:     A complete review of systems was performed and is negative except as noted in the HPI           Physical Exam:   Temp: 98.5  F (36.9  C) Temp src: Oral BP: 128/81 Pulse: 90   Resp: 18 SpO2: 96 % O2 Device: None (Room air)    Wt:   Wt Readings from Last 2 Encounters:   01/22/24 58.9 kg (129 lb 12.8 oz)        General:  female sitting up in bed in NAD. Appears comfortable.  Answers appropriately.  Mother at bedside.   HEENT: Head is AT/NC. Sclera anicteric. MMM.   Neck: Supple. No masses appreciated.  Lungs: Clear to auscultation bilaterally.  No wheezes, rhonchi or crackles appreciated.     Heart: Regular rate and rhythm.  No murmurs, gallops or rubs appreciated.  Normal S1 and S2. Peripheral perfusion intact.  Abdomen: Soft, non-tender, mild distention. No evidence of ascites. Fullness throughout left abdomen, non-tender, no fluctuance or induration.   Extremities: WWP, no pedal edema.  Musculoskeletal: No gross deformity.  Skin: No jaundice or rash  Neurologic: Grossly non-focal.  CN 2-12 grossly intact.   Mental status/Psych: A&O. Asks/answers questions appropriately            Data:   LAB WORK:    BMP  Recent Labs   Lab 01/24/24  0607 01/23/24  0632 01/22/24  0642 01/21/24  0331    139 133* 132*   POTASSIUM 4.1 3.8 3.5 4.4   CHLORIDE 106 104 97* 95*   DUSTIN 8.4* 8.9 9.2 9.3   CO2 22 26 25 25   BUN 6.0 6.4 4.1* 4.9*   CR 0.60 0.63 0.59 0.56   * 107* 118* 138*     CBC  Recent Labs   Lab 01/24/24  0607 01/23/24  1906 01/23/24  0632 01/22/24  1408 01/22/24  0642 01/21/24  1902 01/21/24  0926 01/21/24  0331   WBC 5.2  --  4.5  --  7.2  --   --  10.6   RBC 2.85*  --  3.04*  --  3.39*  --   --  3.49*   HGB 7.3*   < > 7.9*   < > 8.6*  8.6*   < > 9.2* 9.1*   HCT 24.2*  --  26.0*  --  28.7*  --  29.7* 29.9*   MCV 85  --  86  --  85  --   --  86   MCH 25.6*  --  26.0*  --  25.4*  --   --  26.1*   MCHC 30.2*  --  30.4*  --  30.0*  --   --  30.4*   RDW 17.3*  --  17.2*  --  17.2*  --   --  17.1*     --  367  --  401  --   --  410    < > = values in this interval not displayed.     INR  Recent Labs   Lab 01/19/24  1740   INR 1.02     LFTs  Recent Labs   Lab 01/21/24  0331 01/20/24  0634 01/19/24  1740   ALKPHOS 90 89 94   AST 15 15 17   ALT <5 <5 12   BILITOTAL 0.3 0.4 0.3   PROTTOTAL 7.0 6.9 7.1   ALBUMIN 3.4* 3.5 3.7      PANC  Recent Labs   Lab 01/21/24  0331   LIPASE 165*       IMAGING:  CT AP w contrast (OSH) 1/2/2024:  FINDINGS:   Lower chest: Unremarkable.     Liver: Unremarkable. Normal in size and attenuation. No suspicious masses.     Gallbladder and bile ducts: Unremarkable. No  stones or inflammation. No biliary ductal dilatation.     Spleen: Unremarkable. Normal in size. No masses.     Adrenal glands: Unremarkable. No nodules.     Pancreas: Peripancreatic stranding surrounds the pancreatic head. Multiple pancreatic low density collections. Pancreatic neck low density collection measures approximately 4.0 X 3.5 cm with a focus of enhancement along the anterior aspect of the collection measuring 1.2 cm, which may represent a pseudoaneurysm. Pancreatic tail collections abutting and inseparable from the lesser curvature of the stomach measuring approximately 3.1 x 2.7 cm. Large pancreatic tail collection measuring approximately 12 x 10 cm with mass effect in near complete effacement of the left kidney.     Kidneys: Mass effect with near complete effacement of the left kidney as above. Persistent left subcapsular low density collection, incompletely evaluated due to mass effect. No hydronephrosis.     GI tract: Normal in caliber. No evidence of obstruction. Normal appendix.     Lymph nodes: No lymphadenopathy.     Vasculature: Unremarkable.     Omentum/Peritoneum/Abdominal Wall: Unremarkable. No free air or significant free fluid.     Pelvis: Unremarkable.     Bones: Unremarkable for age.     IMPRESSION:   1. Findings related to pancreatitis with multiple low density collections as detailed above, which may represent walled-off necrosis or pseudocysts.   2. Pancreatic neck collection with focus of enhancement along its anterior aspect, which may represent a pseudoaneurysm.   3. Large pancreatic tail collection with mass effect and near complete effacement of the left kidney.         CT AP w contrast 1/19/2024 (OSH):  COMPARISON:   January 2, 2024.   FINDINGS:   Lower chest: Scattered atelectasis.     Liver: Unremarkable. Normal in size and attenuation. No suspicious masses.     Gallbladder and bile ducts: Unremarkable. No stones or inflammation. No biliary dilatation.     Pancreas: Persistent  peripancreatic stranding about the head with similar to minimal increase in size of previously described multi focal fluid collections including 3.2 centimeter lesser curvature fluid collection (series 4/image 62), and large 16.1 centimeter pancreatic tail collection (series 4/image 71) causing effacement of the left kidney with associated small inferior pole subcapsular fluid collection. Re-demonstration of 4.1 centimeter pancreatic neck fluid collection with interval increase in size previously described pseudoaneurysm, currently measuring 2.9 x 3.9 x 2.6 centimeters (series 2/image 57, series 4/image 42), and likely arising from the GDA (series 2/image 52, series 4/image 37 and 38). Few additional tiny hyperdense foci along the inferior margin of this fluid collection (series 2/image 69 and 71).     Spleen: Unremarkable. Normal in size. No masses.     Adrenal glands: Unremarkable. No nodules.     Kidneys: Unremarkable. No suspicious masses, stones, or hydronephrosis.     GI tract: Unremarkable. Normal in caliber. No sign of mass or inflammation. Normal appendix.     Vasculature: Common hepatic artery appears to arise from the SMA. Abdominal aorta is normal in caliber. Mesenteric arteries are patent.     Lymph nodes: No lymphadenopathy.     Peritoneum/Abdominal Wall: Unremarkable. No sign of mass or infiltration. No free air or significant free fluid.     Pelvis: Unremarkable.     Bones: Unremarkable for age.     IMPRESSION:   Findings concerning for worsening/bleeding pseudoaneurysm about pancreatic neck, currently measuring up to 3.9 centimeters, and likely arising from the gastric duodenal artery (common hepatic artery appears to arise from the SMA). Recommend interventional radiology consultation.     Persistent peripancreatic fluid collections, similar to minimally increased in size, including 3.2 centimeter lesser curvature collection and 16.1 centimeter pancreatic tail collection with resultant effacement  of the left kidney/small inferior pole subcapsular fluid collection.       CTA AP 1/22/2024:  Comparison study: Intraoperative images 1/19/2023. CT in exceptions.     Findings:       The abdominal aorta is normal in caliber.     The celiac axis is patent giving rise to phrenic branches, splenic,  and left gastric arteries. Replaced common hepatic artery arises from  the superior mesenteric artery. Superior mesenteric and inferior  mesenteric arteries are patent. No appreciable pseudoaneurysm in the  vessels coursing through the pancreas. Postembolization changes of the  large previously demonstrated gastroduodenal and smaller jejunal  branch pseudoaneurysms (outside CT in exceptions). There is minimal  heterogeneous hypodensity in the left lobe of the liver on arterial  phase with minimal scattered foci of hyperdensity in the left hepatic  artery distribution that was not present on outside comparison, likely  representing minimal non target embolization.     Bilateral common iliac, external iliac, and internal iliac arteries  are patent. Bilateral common femoral and visualized superficial  profunda femoral arteries are patent.     The superior mesenteric vein is patent but are severely narrowed at  their confluence at the level of the pancreas where there is a  slitlike main portal vein as it courses near the pancreas and returns  to normal caliber distally at the hepatic hilum. Severe narrowing of  the splenic vein. There are prominent superior mesenteric and  gastrosplenic varices.      The IVC and major hepatic veins are patent.     Liver: Normal parenchymal attenuation without focal mass. Small fluid  collection adjacent to the caudate lobe, likely sequelae of  pancreatitis.  Biliary system: Vicarious excretion of contrast in the gallbladder  lumen. No appreciable calculi.. No intrahepatic or extrahepatic  biliary ductal dilatation.  Pancreas: Sequela of pancreatitis with mesenteric edema and fat  stranding  about the pancreatic head and large pancreatic tail  pseudocyst that extends into the gastric fundus along the greater  curvature and into the left renal capsule.   Stomach: Ill-defined focus of hypoattenuation near the fundus of the  stomach along the greater curvature that abuts the pancreatic  pseudocyst and appears to be communication with the pancreatic tail  pseudocyst.  Spleen: Within normal limits.  Adrenal glands: Within normal limits.  Kidneys: Right kidney is unremarkable without hydronephrosis, mass, or  calculi. The pancreatic pseudocyst has extended into Gerota's fascia  causing a large renal capsule pseudocyst measuring approximately 16.4  x 12.4 x 10.9 cm, causing significant compression of the left kidney.  No appreciable mass, calculi, or hydronephrosis of the left kidney.  Bladder: Within normal limits.  Reproductive organs: Within normal limits.  Bowel: Decompressed large and small bowel with trace fecal material in  the colon and predominantly fluid-filled small bowel.  Normal caliber large and small bowel. Lymph nodes: No intra-abdominal  or pelvic lymphadenopathy.  Peritoneum: No free air. Intermediate density fluid in the abdomen  likely represents a combination of of free fluid in blood products.      Chest: Heart size is normal. No pericardial effusion. The distal  gastric esophagus and aorta are unremarkable. Linear platelike  atelectasis in the lung bases. No focal consolidation or suspicious  pulmonary nodule.     Bones: No suspicious osseous lesion.     Soft tissues: Postprocedural changes in the right groin with a small  hyperdense focus superficial to the right common femoral artery that  is present on the noncontrast exam and does not change on the portal  venous phase, likely not representing hemorrhage.                                                                      Impression:  1. Sequela of pancreatitis with large pancreatic pseudocyst extending  into the gastric fundus and  into the left Gerota's fascia causing  significant compression of the left kidney.   2. Postprocedural changes of embolization of the large gastroduodenal  and smaller jejunal branch pseudoaneurysms. No active extravasation  identified. No new new pseudoaneurysm identified.   3. Significant compression of the mesentricoportal and splenoportal  confluence, proximal portal vein and splenic vein resulting in  prominent superior mesenteric and gastrosplenic varices.        =======================================================================

## 2024-01-24 NOTE — PLAN OF CARE
Goal Outcome Evaluation:      Plan of Care Reviewed With: patient    Overall Patient Progress: no changeOverall Patient Progress: no change    Outcome Evaluation: A&Ox4. VSS on RA. Pt c/o abd cramping, but refusing medication. Hgb 7.9. Replaced mag PO, recheck tomorrow AM. Possible discharge home tomorrow.

## 2024-01-24 NOTE — TELEPHONE ENCOUNTER
Advanced Endoscopy     Referring provider: Inpatient ealth    Referred to: [facility]     Provider Requested: na     Referral Received: 01/23/24     Records received: Epic     Images received: PACS    [facility]    Evaluation for: 27 yo F with recurrent pancreatitis and 2 large (16 cm) pancreatic fluid collections, s/p IR embolization of pseudoaneurysms - request for GI outpatient follow up     Clinical History (per RN review):     Current admission notes:       Expand All Collapse All        [facility]     Progress Note - Medicine Service, J LUIS TEAM 3       Date of Admission:  1/19/2024        Assessment & Plan  Rani Lisa is a 28 year old female admitted on 1/19/2024 with a history of alcohol use disorder, genital warts, ADHD, alcohol induced pancreatitis, who was transferred from [place] for 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  She was transferred to floor for the ongoing management.     Updates today:  -CTA notable for Sequela of pancreatitis with large pancreatic pseudocyst and post procedural changes of embolizaton  -Touch base with GI for outpatient referral  -Decrease Dilaudid to 0.2 mg      # Pancreatic Pseudoaneurysm s/p embolization and coiling  # Recurrent pancreatitis c/b multifocal  Peripancreatic fluid collections largest being 16 cm  Patient with a past medical history of 2 episodes of acute pancreatitis last year, recent pancreatitis 2 weeks ago, presented to outside hospital with acute abdominal pain and nausea. CT AP at OSH notable for Expanding pseudoaneurysms and persistent peripancreatic multifocal fluid collections of 3.2 cm and 16.1 cm pancreatic tail collection causing effacement of left kidney. She was transferred from [facility] due to concern of expanding pancreatic pseudoaneurysm. Taken to IR for  embolization on 1/19, successfully embolized 2 pseudoaneurysms (gastroduodenal artery and proximal jejunal artery) one 5cm and another smaller 1cm. Patient tolerated procedure well.   X-ray KUB not concerning for bowel obstruction on 1/21. Repeat CTA notable for Sequela of pancreatitis with large pancreatic pseudocyst extending into the gastric fundus and left kidney. Also notable for prominent superior mesenteric and gastrosplenic varices.   - Pain control               -Tylenol 650 mg every 4 hours               -Dilaudid 0.2mg IV every 12 hours as needed              -Oxycodone 5-10 mg p.o. every 4 hours as needed              -Robaxin 500 mg IV every 8 hours as needed for crampy abdominal pain  -Nausea and vomiting-improved  - 4 mg Zofran PO or IV Q6 hours PRN  - IV compazine 5-10 mg Q6 hours PRN  -Advance diet as tolerated: Regular diet             MD review date: 01/23/24    MD Decision for clinic consultation/Orders:            Referral updates/Patient contacted:

## 2024-01-24 NOTE — DISCHARGE SUMMARY
[facility]  Discharge Summary - Medicine & Pediatrics       Date of Admission:  1/19/2024  Date of Discharge:  1/24/2024  Discharging Provider: Dr. Avalos  Discharge Service: Medicine Service, J LUIS TEAM 3    Discharge Diagnoses   Recurrent pancreatitis c/b multifocal pancreatic pseudocysts   Pancreatic pseudoaneurysms of gastroduodenal artery s/p IR embolization on 1/19     Clinically Significant Risk Factors          Follow-ups Needed After Discharge   -Please follow-up with GI outpatient in 4 weeks after getting CT abdomen pelvis with contrast    Unresulted Labs Ordered in the Past 30 Days of this Admission       No orders found from 12/20/2023 to 1/20/2024.        These results will be followed up by [facility]    Discharge Disposition   Discharged to home  Condition at discharge: Stable    Hospital Course   Rani Lisa was admitted on 1/19/2024 for Pancreatic pseudoaneurysms .  The following problems were addressed during her hospitalization:    # Recurrent pancreatitis c/b multifocal  Peripancreatic fluid collections largest being 16 cm  # Pancreatic Pseudoaneurysm s/p embolization and coiling of gastroduodenal artery and proximal jejunal artery  Patient with a past medical history of 2 episodes of acute pancreatitis last year, recent pancreatitis 2 weeks ago, presented to outside hospital with acute abdominal pain and nausea. CT AP at OSH notable for Expanding pseudoaneurysms and persistent peripancreatic multifocal fluid collections of 3.2 cm and 16.1 cm pancreatic tail collection causing effacement of left kidney. She was transferred from [facility] due to concern of expanding pancreatic pseudoaneurysm. Taken to IR for embolization on 1/19, successfully embolized 2 pseudoaneurysms (gastroduodenal artery and proximal jejunal artery) one 5cm and another smaller 1cm. Patient tolerated procedure well.   X-ray KUB not concerning for bowel obstruction on  1/21. Repeat CTA notable for Sequela of pancreatitis with large pancreatic pseudocyst extending into the gastric fundus and left kidney. Also notable for prominent superior mesenteric and gastrosplenic varices.  However, there are no acute concerns of infection, pain, biliary obstruction or jaundice.  Given the size of her pseudocyst and varices, she is at very high risk for complications.  Discussed all this with the patient.  GI panc bili team has been consulted.  Recommended repeat CT abdomen pelvis with contrast in 4 weeks.  Plans to drain the pseudocyst if it is stable or worsening at that time.  -He is follow-up with GI outpatient for pancreatic pseudocyst after CT abdomen pelvis  -Continue to take Tylenol if there is any concern for pain  -Continue to abstain from alcohol    # R Groin pain, resolved at the time of discharge  From recent procedure performed on 1/19. Site appearing clean, dry, no active bleed, no signs of hematoma. Pain control as above.    # AUD  Pt with known AUD and alcohol induced pancreatitis. Per pt last drink was 19 days ago with no current concern for withdrawal. Patient is tachycardic likely secondary to abdominal pain and dehydration but no other signs of possible ETOH withdrawal.  Also offered addiction medicine consult to patient. However, Pt is already looking forward to work with addiction team OSH.  -Folic acid tablet 1 mg daily  -Thiamine 100 mg once daily  -Continue to abstain from alcohol     # HTN-resolved  # Tachycardia-resolved  Patient was tachycardic ever since admission, associated with hypertension with SBPs of 130s-150s range which is possibly d/t acute pain from the procedure and dehydration.  Hypertension and tachycardia improved during the hospitalization.       # Hypomagnesemia-resolved     # Anemia, unspecified  On admission hemoglobin 9.0, MCV 86. Overall unclear etiology and likely multifactorial given age, gender, AUD, and enlarging pseudoanerysms. No acute  active bleeding or sign of bleed.  -Follow-up with PCP      Consultations This Hospital Stay   INTERVENTIONAL RADIOLOGY ADULT/PEDS IP CONSULT  CARE MANAGEMENT / SOCIAL WORK IP CONSULT  NURSING TO CONSULT FOR VASCULAR ACCESS CARE IP CONSULT  NURSING TO CONSULT FOR VASCULAR ACCESS CARE IP CONSULT  NURSING TO CONSULT FOR VASCULAR ACCESS CARE IP CONSULT  NURSING TO CONSULT FOR VASCULAR ACCESS CARE IP CONSULT  GI PANCREATICOBILIARY ADULT IP CONSULT    Code Status   Full Code       The patient was discussed with Dr. Bailey Small MD  [facility] 7C MED SURG  [address]  [place] [address]  Phone: [phone]  ______________________________________________________________________    Physical Exam   Vital Signs: Temp: 98.5  F (36.9  C) Temp src: Oral BP: 128/81 Pulse: 90   Resp: 18 SpO2: 96 % O2 Device: None (Room air)    Weight: 127 lbs 3.2 oz    General:  female sitting up in bed in NAD. Appears comfortable.  Answers appropriately.  Mother at bedside.   HEENT: Head is AT/NC. Sclera anicteric. MMM.   Neck: Supple. No masses appreciated.  Lungs: Clear to auscultation bilaterally.  No wheezes, rhonchi or crackles appreciated.    Heart: Regular rate and rhythm.  No murmurs, gallops or rubs appreciated.  Normal S1 and S2. Peripheral perfusion intact.  Abdomen: Soft, non-tender, mild distention. No evidence of ascites. Fullness throughout left abdomen, non-tender, no fluctuance or induration.   Extremities: no pedal edema.  Musculoskeletal: No gross deformity.  Skin: No jaundice or rash  Neurologic: Grossly non-focal.  CN 2-12 grossly intact.   Mental status/Psych: A&O. Asks/answers questions appropriately      Primary Care Physician   Provider Not In System    Discharge Orders      CT Abdomen pelvis w contrast*     Adult GI  Referral - Consult Only      Reason for your hospital stay    Ms. Lisa,     Marc were admitted to [facility] from 1/19-1/24 due to acute abdominal  pain and nausea. Imaging at an outside hospital showed presence 2 pancreatic  pseudoaneurysms, and you were transferred to [facility] so that interventional radiology could embolize these aneurysms. They were successfully embolized without complication.Your pain was managed with medications and your diet was slowly advanced as able. On discharge, it will be important to follow-up with GI as your pancreatic cysts still remain and appear to be enlarging. At some point they may need to be drained, but they will at the very least need to be followed on imaging. It will also be important to follow up with an AA group or addiction medicine specialists near you. Alcohol consumption will continue to cause pancreatitis and worsen or cause the development of new cysts.     Activity    Your activity upon discharge: activity as tolerated     Adult [facility] Follow-up and recommended labs and tests    Follow up with Hepatobiliary GI team at [facility]  in 1 month to discuss follow up and management of pancreatic cysts long term. Will need to have CT abdomen/pelvis prior to this visit.     Appointments on [place] and/or [facility] (with [facility] or [facility] provider or service). Call [phone] if you haven't heard regarding these appointments within 7 days of discharge.     Diet    Follow this diet upon discharge: Regular Diet Adult       Significant Results and Procedures   Most Recent 3 CBC's:  Recent Labs   Lab Test 01/24/24  0607 01/24/24  0321 01/23/24  1906 01/23/24  0632 01/22/24  1408 01/22/24  0642   WBC 5.2  --   --  4.5  --  7.2   HGB 7.3* 7.4* 7.1* 7.9*   < > 8.6*  8.6*   MCV 85  --   --  86  --  85     --   --  367  --  401    < > = values in this interval not displayed.     Most Recent 3 BMP's:  Recent Labs   Lab Test 01/24/24  0607 01/23/24  0632 01/22/24  0642    139 133*   POTASSIUM 4.1 3.8 3.5   CHLORIDE 106 104 97*   CO2 22 26 25   BUN 6.0 6.4 4.1*   CR 0.60 0.63 0.59    ANIONGAP 11 9 11   DUSTIN 8.4* 8.9 9.2   * 107* 118*       Discharge Medications   Current Discharge Medication List        START taking these medications    Details   folic acid (FOLVITE) 1 MG tablet Take 1 tablet (1 mg) by mouth daily  Qty: 30 tablet, Refills: 0    Associated Diagnoses: Alcohol use disorder      thiamine (B-1) 100 MG tablet Take 1 tablet (100 mg) by mouth daily  Qty: 30 tablet, Refills: 0    Associated Diagnoses: Alcohol use disorder           CONTINUE these medications which have NOT CHANGED    Details   multivitamin w/minerals (MULTI-VITAMIN) tablet Take 1 tablet by mouth daily           Allergies   Allergies   Allergen Reactions    Bactrim [Sulfamethoxazole-Trimethoprim] Rash

## 2024-01-26 ENCOUNTER — PATIENT OUTREACH (OUTPATIENT)
Dept: CARE COORDINATION | Facility: CLINIC | Age: 29
End: 2024-01-26
Payer: COMMERCIAL

## 2024-01-26 NOTE — PROGRESS NOTES
Clinic Care Coordination Contact  [facility]: Post-Discharge Note  SITUATION                                                      Admission:    Admission Date: 01/19/24   Reason for Admission: acute abdominal pain and nausea.  Discharge:   Discharge Date: 01/24/24  Discharge Diagnosis: Pancreas artery pseudoaneurysm (H24)    BACKGROUND                                                      Per hospital discharge summary and inpatient provider notes:  Rani Lisa is a 28 year old female with a history of AUD complicated by alcoholic pancreatitis with pancreatic cyst formation. Transferred from [facility] due to concern for rapidly expanding pancreatic pseudoaneurysm.      The patient reports she was told that she had a pancreatic cyst several weeks ago following a hospitalization for alcohol intoxication. Notes that she was told there was nothing to worry about as the cyst was small and not likely to cause any problems. Pt recalls going to [facility] for flu-like symptoms there she had an abdominal US done earlier this month which showed the pseudocyst but was told that it only needed to be monitored. Since then she had a dull pain that was worsening, however, she attributed pain to her perseverating on knowing the cyst was there.      This morning around 3am she awoke with acute abdominal pain and noticed chills and sweats (though she attributed this to the ambient temperature). She went to [facility] to further investigate as she was worried about her pseudocyst being the culprit. She was treated locally with morphine and had laboratory studies which were overall initially reassuring.  The patient did undergo imaging which again showed an expanding pancreatic pseudocyst with concern for impending hemorrhage. The provider did reach out to [facility] at the closest facility stating they were inadequately equipped to manage this and so they recommended transfer.      Taken by IR for  embolization on 1/19, successfully embolized 2 pseudoaneurysms one 5cm and another smaller 1cm. Patient tolerated procedure well.       ASSESSMENT           Discharge Assessment  How are you doing now that you are home?: alright  How are your symptoms? (Red Flag symptoms escalate to triage hotline per guidelines): Improved  Do you feel your condition is stable enough to be safe at home until your provider visit?: Yes  Does the patient have their discharge instructions? : Yes  Does the patient have questions regarding their discharge instructions? : No  Were you started on any new medications or were there changes to any of your previous medications? : Yes  Does the patient have all of their medications?: Yes  Do you have questions regarding any of your medications? : No  Do you have all of your needed medical supplies or equipment (DME)?  (i.e. oxygen tank, CPAP, cane, etc.): Yes  Discharge follow-up appointment scheduled within 14 calendar days? : Yes                  PLAN                                                      Outpatient Plan:  -Please follow-up with GI outpatient in 4 weeks after getting CT abdomen pelvis with contrast     Future Appointments   Date Time Provider Department Center   2/2/2024 10:00 AM Dodie Lofton MD [facility]         For any urgent concerns, please contact our 24 hour nurse triage line: [phone] ([phone])         RAGHAVENDRA Borges

## 2024-02-02 ENCOUNTER — OFFICE VISIT (OUTPATIENT)
Dept: FAMILY MEDICINE | Facility: CLINIC | Age: 29
End: 2024-02-02
Payer: COMMERCIAL

## 2024-02-02 VITALS
OXYGEN SATURATION: 97 % | DIASTOLIC BLOOD PRESSURE: 76 MMHG | TEMPERATURE: 97.7 F | HEIGHT: 65 IN | BODY MASS INDEX: 21.33 KG/M2 | HEART RATE: 84 BPM | RESPIRATION RATE: 16 BRPM | WEIGHT: 128 LBS | SYSTOLIC BLOOD PRESSURE: 118 MMHG

## 2024-02-02 DIAGNOSIS — Z11.59 NEED FOR HEPATITIS C SCREENING TEST: ICD-10-CM

## 2024-02-02 DIAGNOSIS — D64.9 ANEMIA, UNSPECIFIED TYPE: ICD-10-CM

## 2024-02-02 DIAGNOSIS — I72.8: ICD-10-CM

## 2024-02-02 DIAGNOSIS — Z09 HOSPITAL DISCHARGE FOLLOW-UP: Primary | ICD-10-CM

## 2024-02-02 DIAGNOSIS — K85.90 RECURRENT ACUTE PANCREATITIS: ICD-10-CM

## 2024-02-02 DIAGNOSIS — Z11.4 SCREENING FOR HIV (HUMAN IMMUNODEFICIENCY VIRUS): ICD-10-CM

## 2024-02-02 PROBLEM — N28.1 RENAL CYST: Status: ACTIVE | Noted: 2023-01-08

## 2024-02-02 PROBLEM — F90.2 ATTENTION DEFICIT HYPERACTIVITY DISORDER (ADHD), COMBINED TYPE: Status: ACTIVE | Noted: 2020-07-30

## 2024-02-02 PROBLEM — G43.009 MIGRAINE WITHOUT AURA AND WITHOUT STATUS MIGRAINOSUS, NOT INTRACTABLE: Status: ACTIVE | Noted: 2022-09-10

## 2024-02-02 PROBLEM — K85.21 ALCOHOL-INDUCED ACUTE PANCREATITIS WITH UNINFECTED NECROSIS: Status: ACTIVE | Noted: 2024-01-02

## 2024-02-02 PROBLEM — F32.1 CURRENT MODERATE EPISODE OF MAJOR DEPRESSIVE DISORDER WITHOUT PRIOR EPISODE (H): Status: ACTIVE | Noted: 2024-02-02

## 2024-02-02 LAB
ERYTHROCYTE [DISTWIDTH] IN BLOOD BY AUTOMATED COUNT: 17.3 % (ref 10–15)
HCT VFR BLD AUTO: 30 % (ref 35–47)
HCV AB SERPL QL IA: NONREACTIVE
HGB BLD-MCNC: 8.9 G/DL (ref 11.7–15.7)
HIV 1+2 AB+HIV1 P24 AG SERPL QL IA: NONREACTIVE
MCH RBC QN AUTO: 25.2 PG (ref 26.5–33)
MCHC RBC AUTO-ENTMCNC: 29.7 G/DL (ref 31.5–36.5)
MCV RBC AUTO: 85 FL (ref 78–100)
PLATELET # BLD AUTO: 371 10E3/UL (ref 150–450)
RBC # BLD AUTO: 3.53 10E6/UL (ref 3.8–5.2)
WBC # BLD AUTO: 8.8 10E3/UL (ref 4–11)

## 2024-02-02 PROCEDURE — 99495 TRANSJ CARE MGMT MOD F2F 14D: CPT | Performed by: STUDENT IN AN ORGANIZED HEALTH CARE EDUCATION/TRAINING PROGRAM

## 2024-02-02 PROCEDURE — 85027 COMPLETE CBC AUTOMATED: CPT | Performed by: STUDENT IN AN ORGANIZED HEALTH CARE EDUCATION/TRAINING PROGRAM

## 2024-02-02 PROCEDURE — 87389 HIV-1 AG W/HIV-1&-2 AB AG IA: CPT | Performed by: STUDENT IN AN ORGANIZED HEALTH CARE EDUCATION/TRAINING PROGRAM

## 2024-02-02 PROCEDURE — 86803 HEPATITIS C AB TEST: CPT | Performed by: STUDENT IN AN ORGANIZED HEALTH CARE EDUCATION/TRAINING PROGRAM

## 2024-02-02 PROCEDURE — 36415 COLL VENOUS BLD VENIPUNCTURE: CPT | Performed by: STUDENT IN AN ORGANIZED HEALTH CARE EDUCATION/TRAINING PROGRAM

## 2024-02-02 RX ORDER — ESCITALOPRAM OXALATE 10 MG/1
1 TABLET ORAL EVERY MORNING
COMMUNITY
Start: 2023-09-05 | End: 2024-03-19

## 2024-02-02 ASSESSMENT — PAIN SCALES - GENERAL: PAINLEVEL: NO PAIN (0)

## 2024-02-02 NOTE — PROGRESS NOTES
Assessment & Plan     Hospital discharge follow-up  Pancreas artery pseudoaneurysm (H24)  Recurrent acute pancreatitis  Patient is a very pleasant 28-year-old female presents today for hospital follow-up after hospitalization from 1/19/2024 until 1/24/2024 for acute recurrent pancreatitis.  During hospitalization, patient was noted to have significant anemia, see below.  She has a history of alcohol use disorder, however has not had any alcohol consumption now for over a month.  Did not have triglycerides checked, however in the context of history of alcohol use, pancreatitis will certainly related to that.  Could consider triglyceride recheck in the future.  From pancreatitis standpoint, symptomatically improved.  Has follow-up CT planned through GI, though needs to schedule this and she is encouraged to do so today.    Anemia, unspecified type  Patient had significant drop in hemoglobin during her hospital stay.  This stabilized out.  For recheck, we did obtain a CBC today.  Reassuringly, the hemoglobin is improving quite significantly.  - CBC with platelets  - CBC with platelets    Screening for HIV (human immunodeficiency virus)  Once in a lifetime screen.   - HIV Antigen Antibody Combo  - HIV Antigen Antibody Combo    Need for hepatitis C screening test  Once in a lifetime screen.   - Hepatitis C Screen Reflex to HCV RNA Quant and Genotype  - Hepatitis C Screen Reflex to HCV RNA Quant and Genotype      I spent a total of 20 minutes on the day of the visit.   Time spent by me doing chart review, history and exam, documentation and further activities per the note    MED REC REQUIRED  Post Medication Reconciliation Status:     Nicotine/Tobacco Cessation  She reports that she has been smoking cigarettes. She has never used smokeless tobacco.  Nicotine/Tobacco Cessation Plan      Subjective   Rayshawn is a 28 year old, presenting for the following health issues:  [facility] F/U        2/2/2024    10:03  AM   Additional Questions   Roomed by Mahogany HIGH   Accompanied by Self     HPI         1/26/2024     9:35 AM   Post Discharge Outreach   Admission Date 1/19/2024   Reason for Admission acute abdominal pain and nausea.   Discharge Date 1/24/2024   Discharge Diagnosis Pancreas artery pseudoaneurysm (H24)   How are you doing now that you are home? alright   How are your symptoms? (Red Flag symptoms escalate to triage hotline per guidelines) Improved   Do you feel your condition is stable enough to be safe at home until your provider visit? Yes   Does the patient have their discharge instructions?  Yes   Does the patient have questions regarding their discharge instructions?  No   Were you started on any new medications or were there changes to any of your previous medications?  Yes   Does the patient have all of their medications? Yes   Do you have questions regarding any of your medications?  No   Do you have all of your needed medical supplies or equipment (DME)?  (i.e. oxygen tank, CPAP, cane, etc.) Yes   Discharge follow-up appointment scheduled within 14 calendar days?  Yes     Hospital Follow-up Visit:  Hospital/Nursing Home/ Rehab Facility: [facility]  Date of Admission: 01/19/2024  Date of Discharge: 01/24/2024  Reason(s) for Admission: Pancreas artery pseudoaneurysm    Was your hospitalization related to COVID-19? No   Problems taking medications regularly:  None  Medication changes since discharge: None  Problems adhering to non-medication therapy:  None    Summary of hospitalization:  [facility] discharge summary reviewed  Diagnostic Tests/Treatments reviewed.  Follow up needed: none  Other Healthcare Providers Involved in Patient s Care:         None  Update since discharge: improved.       No bleedings.   Just had menses. Heavy in the beginning for the first 2 days then not so bad.     Vape and cigs.     Plan of care communicated with  "patient               Review of Systems  Constitutional, HEENT, cardiovascular, pulmonary, gi and gu systems are negative, except as otherwise noted.      Objective    /76 (BP Location: Right arm, Patient Position: Sitting, Cuff Size: Adult Regular)   Pulse 84   Temp 97.7  F (36.5  C) (Tympanic)   Resp 16   Ht 1.651 m (5' 5\")   Wt 58.1 kg (128 lb)   LMP 01/23/2024   SpO2 97%   BMI 21.30 kg/m    Body mass index is 21.3 kg/m .  Physical Exam  Constitutional:       Appearance: Normal appearance.   HENT:      Head: Normocephalic.   Eyes:      General: No scleral icterus.     Extraocular Movements: Extraocular movements intact.      Conjunctiva/sclera: Conjunctivae normal.   Cardiovascular:      Rate and Rhythm: Normal rate and regular rhythm.      Heart sounds: Normal heart sounds.   Pulmonary:      Effort: Pulmonary effort is normal.      Breath sounds: Normal breath sounds. No wheezing or rhonchi.   Abdominal:      General: Abdomen is flat.      Palpations: Abdomen is soft.      Tenderness: There is no abdominal tenderness. There is no right CVA tenderness or left CVA tenderness.   Neurological:      General: No focal deficit present.      Mental Status: She is alert and oriented to person, place, and time.           Results from this visit  Results for orders placed or performed in visit on 02/02/24   CBC with platelets     Status: Abnormal   Result Value Ref Range    WBC Count 8.8 4.0 - 11.0 10e3/uL    RBC Count 3.53 (L) 3.80 - 5.20 10e6/uL    Hemoglobin 8.9 (L) 11.7 - 15.7 g/dL    Hematocrit 30.0 (L) 35.0 - 47.0 %    MCV 85 78 - 100 fL    MCH 25.2 (L) 26.5 - 33.0 pg    MCHC 29.7 (L) 31.5 - 36.5 g/dL    RDW 17.3 (H) 10.0 - 15.0 %    Platelet Count 371 150 - 450 10e3/uL   Hepatitis C Screen Reflex to HCV RNA Quant and Genotype     Status: Normal   Result Value Ref Range    Hepatitis C Antibody Nonreactive Nonreactive   HIV Antigen Antibody Combo     Status: Normal   Result Value Ref Range    HIV Antigen " Antibody Combo Nonreactive Nonreactive               Signed Electronically by: Dodie Lofton MD

## 2024-02-02 NOTE — LETTER
February 9, 2024      Rayshawn Lisa  [address] [facility] [address]        Dear ,    We are writing to inform you of your test results.    Hemoglobin is improving well since her hospital stay. HIV and Hepatitis C tests returned nonreactive, which is normal.       Resulted Orders   CBC with platelets   Result Value Ref Range    WBC Count 8.8 4.0 - 11.0 10e3/uL    RBC Count 3.53 (L) 3.80 - 5.20 10e6/uL    Hemoglobin 8.9 (L) 11.7 - 15.7 g/dL    Hematocrit 30.0 (L) 35.0 - 47.0 %    MCV 85 78 - 100 fL    MCH 25.2 (L) 26.5 - 33.0 pg    MCHC 29.7 (L) 31.5 - 36.5 g/dL    RDW 17.3 (H) 10.0 - 15.0 %    Platelet Count 371 150 - 450 10e3/uL   Hepatitis C Screen Reflex to HCV RNA Quant and Genotype   Result Value Ref Range    Hepatitis C Antibody Nonreactive Nonreactive      Comment:      A nonreactive screening test result does not exclude the possibility of exposure to or infection with HCV. Nonreactive screening test results in individuals with prior exposure to HCV may be due to antibody levels below the limit of detection of this assay or lack of reactivity to the HCV antigens used in this assay. Patients with recent HCV infections (<3 months from time of exposure) may have false-negative HCV antibody results due to the time needed for seroconversion (average of 8 to 9 weeks).   HIV Antigen Antibody Combo   Result Value Ref Range    HIV Antigen Antibody Combo Nonreactive Nonreactive      Comment:      Negative HIV-1/-2 antigen and antibody screening test results usually indicate the absence of HIV-1 and HIV-2 infection. However, such negative results do not rule-out acute HIV infection.  If acute HIV-1 or HIV-2 infection is suspected, detection of HIV-1 or HIV-2 RNA  is recommended.        If you have any questions or concerns, please call the clinic at the number listed above.       Sincerely,      Dodie Lofton MD

## 2024-02-02 NOTE — PATIENT INSTRUCTIONS
If you had an XRay/CT/Ultrasound/MRI ordered the number is [phone] to schedule or change your radiology appointment.       I recommend that you take an over the counter iron supplement (325 mg) every other day or three days a week (like Monday, Wednesday, Friday). Taking it more frequently increases your risk for constipation, and doesn't change the effectiveness of the medication.

## 2024-02-12 ENCOUNTER — PATIENT OUTREACH (OUTPATIENT)
Dept: GASTROENTEROLOGY | Facility: CLINIC | Age: 29
End: 2024-02-12
Payer: COMMERCIAL

## 2024-02-12 NOTE — PROGRESS NOTES
GI Hospital follow up per inpatient Panc/bili service:     Please arrange the following clinic follow-up:   Physician: Dr. Ortiz   Timin weeks   Dx: necrotizing pancreatitis    Labs/imaging prior:   CT scan abd/pelv with IV contrast prior to visit     Unable to reach patient at this time. Intend to offer clinic on 24 @ 1230. [facility] with clinic contact information.     Patient is scheduled for a CT scan on . Will attempt to reschedule imaging for meaningful clinic visit.     Freda Davison RN Care Coordinator

## 2024-02-20 NOTE — PROGRESS NOTES
Unable to reach patient. LVM x3; [employer] message sent with clinic contact information.     Freda Davison RN Care Coordinator

## 2024-02-26 ENCOUNTER — ANCILLARY PROCEDURE (OUTPATIENT)
Dept: CT IMAGING | Facility: CLINIC | Age: 29
End: 2024-02-26
Payer: COMMERCIAL

## 2024-02-26 DIAGNOSIS — I72.8: ICD-10-CM

## 2024-02-26 PROCEDURE — 74177 CT ABD & PELVIS W/CONTRAST: CPT | Mod: TC | Performed by: RADIOLOGY

## 2024-02-26 RX ORDER — IOPAMIDOL 755 MG/ML
78 INJECTION, SOLUTION INTRAVASCULAR ONCE
Status: COMPLETED | OUTPATIENT
Start: 2024-02-26 | End: 2024-02-26

## 2024-02-26 RX ADMIN — IOPAMIDOL 78 ML: 755 INJECTION, SOLUTION INTRAVASCULAR at 10:53

## 2024-02-28 ENCOUNTER — ONCOLOGY VISIT (OUTPATIENT)
Dept: RADIOLOGY | Facility: CLINIC | Age: 29
End: 2024-02-28
Attending: RADIOLOGY
Payer: COMMERCIAL

## 2024-02-28 VITALS
RESPIRATION RATE: 18 BRPM | BODY MASS INDEX: 21.42 KG/M2 | SYSTOLIC BLOOD PRESSURE: 124 MMHG | WEIGHT: 128.7 LBS | HEART RATE: 77 BPM | OXYGEN SATURATION: 100 % | DIASTOLIC BLOOD PRESSURE: 79 MMHG | TEMPERATURE: 98 F

## 2024-02-28 DIAGNOSIS — I72.8: Primary | ICD-10-CM

## 2024-02-28 PROCEDURE — 99213 OFFICE O/P EST LOW 20 MIN: CPT | Performed by: RADIOLOGY

## 2024-02-28 ASSESSMENT — PAIN SCALES - GENERAL: PAINLEVEL: NO PAIN (0)

## 2024-02-28 NOTE — NURSING NOTE
"Oncology Rooming Note    February 28, 2024 9:33 AM   Rani Lisa is a 28 year old female who presents for:    Chief Complaint   Patient presents with    Oncology Clinic Visit     RTN for Embolization      Initial Vitals: /79   Pulse 77   Temp 98  F (36.7  C) (Oral)   Resp 18   Wt 58.4 kg (128 lb 11.2 oz)   LMP 01/23/2024   SpO2 100%   BMI 21.42 kg/m   Estimated body mass index is 21.42 kg/m  as calculated from the following:    Height as of 2/2/24: 1.651 m (5' 5\").    Weight as of this encounter: 58.4 kg (128 lb 11.2 oz). Body surface area is 1.64 meters squared.  No Pain (0) Comment: Data Unavailable   Patient's last menstrual period was 01/23/2024.  Allergies reviewed: Yes  Medications reviewed: Yes    Medications: Medication refills not needed today.  Pharmacy name entered into [employer]: [facility] - [address]    Frailty Screening:   Is the patient here for a new oncology consult visit in cancer care? 2. No      Clinical concerns: none       Margaret Fermin MA             "

## 2024-02-28 NOTE — PROGRESS NOTES
INTERVENTIONAL RADIOLOGY CONSULTATION    Name: Rani Lisa  Age: 28 year old   Referring Physician: Dr. Dodge   REASON FOR REFERRAL: Arterial pseudoaneurysms related to pancreatitis    HPI: Ms. Lisa is here for follow-up after her embolization of a large GDA as well as smaller proximal SMA branch pseudoaneurysm.    In summary, she has had several episodes of alcohol induced pancreatitis, with most severe episode last month. On a CT at OSH, a large GDA PSA was noted. She was transferred to [facility] for immediate management due to bleeding risk and underwnet entembolization of 2 separate PSAs on 1/19/2024. Followup CT on 1/22/2024 showed successful occlusion.  She was dischraged 3 days later.     She has been doing well. No current abdominal pain. She has been sober now 59 days. No abdomnal pain . She still frequesntly feels fullness and pressure from her pseudocyst. No fever, pain with eating (now on regular diet), no foamy diarrhea. She is having one BM er day, without blood. No nausea or vopmiting,;. No fever, jaundice or abdominal distention.    No lighheadedness or diaphoresis. No cough, dyspnea, chest pain or loeg swelling.    No groin pain or lump. No calf claudication.    PAST MEDICAL HISTORY:   Past Medical History:   Diagnosis Date    ADHD (attention deficit hyperactivity disorder)     Alcohol dependence (H)     Alcoholic pancreatitis     Hyperglycemia     Hypomagnesemia     Hyponatremia     Leukocytosis     Nexplanon insertion        PAST SURGICAL HISTORY:   Past Surgical History:   Procedure Laterality Date    IR VISCERAL ANGIOGRAM  1/19/2024       PROBLEM LIST:   Patient Active Problem List    Diagnosis Date Noted    Current moderate episode of major depressive disorder without prior episode (H) 02/02/2024     Priority: Medium    Alcohol use disorder 01/23/2024     Priority: Medium    Fluid collection of pancreas 01/23/2024     Priority: Medium    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   Presenting with 3-day history of worsening abdominal pain associate with nausea and vomiting   Previous history of alcohol induced pancreatitis   Ongoing history of fairly heavy alcohol abuse   Transferred to this hospital from [place], lab work there showing elevated lipase 1786   CT imaging showing pancreatitis with pseudocyst   Admit inpatient, n.p.o., pain control, fluids   Will ask GI medicine to evaluate and suggest further treatment, monitor for worsening      Renal cyst 01/08/2023     Priority: Medium    Migraine without aura and without status migrainosus, not intractable 09/10/2022     Priority: Medium    Attention deficit hyperactivity disorder (ADHD), combined type 07/30/2020     Priority: Medium    LGSIL on Pap smear of cervix 11/19/2013     Priority: Medium     Formatting of this note might be different from the original.   LGSIL 2013   Ascus 2104   Neg 2016         MEDICATIONS:   Prescription Medications as of 2/28/2024         Rx Number Disp Refills Start End Last Dispensed Date Next Fill Date Owning Pharmacy    escitalopram (LEXAPRO) 10 MG tablet  -- -- 9/5/2023 --       Sig: Take 1 tablet by mouth every morning    Class: Historical    Route: Oral    folic acid (FOLVITE) 1 MG tablet  30 tablet 0 1/24/2024 --   [address]    Sig: Take 1 tablet (1 mg) by mouth daily    Class: E-Prescribe    Route: Oral    multivitamin w/minerals (MULTI-VITAMIN) tablet  -- --  --       Sig: Take 1 tablet by mouth daily    Class: Historical    Route: Oral    potassium aminobenzoate 500 MG CAPS capsule  -- --  --       Class: Historical    Route: Oral    thiamine (B-1) 100 MG tablet  30 tablet 0 1/24/2024 --   [facility]  [place] - [address] [facility]    Sig: Take 1 tablet (100 mg) by mouth daily    Class: E-Prescribe    Route: Oral            ALLERGIES:   Bactrim [sulfamethoxazole-trimethoprim]    ROS:  As above      Physical Examination:   VITALS:   /79   Pulse 77   Temp 98  F (36.7  C) (Oral)   Resp 18   Wt 58.4 kg (128 lb 11.2 oz)   LMP 01/23/2024   SpO2 100%   BMI 21.42 kg/m    Constitutional: alert and no distress  Head: Normocephalic.   Cardiovascular: RRR< no murmur  Respiratory: CTAB  Gastrointestinal: Soft, NTTP, palpable mass left abdomen corresponding to pseudocyst  Skin: No jaundice  Psychiatric: affect flat and mentation appears normal.  Vascular: Right groin with no pain or palpable lump.  2/2 bilateral dorsalis pedis and posterior tibial pulses.    Labs:    BMP RESULTS:  Lab Results   Component Value Date     01/24/2024    POTASSIUM 4.1 01/24/2024    CHLORIDE 106 01/24/2024    CO2 22 01/24/2024    ANIONGAP 11 01/24/2024     (H) 01/24/2024     (H) 01/20/2024    BUN 6.0 01/24/2024    CR 0.60 01/24/2024    GFRESTIMATED >90 01/24/2024    DUSTIN 8.4 (L) 01/24/2024        CBC RESULTS:  Lab Results   Component Value Date    WBC 8.8 02/02/2024    RBC 3.53 (L) 02/02/2024    HGB 8.9 (L) 02/02/2024    HCT 30.0 (L) 02/02/2024    MCV 85 02/02/2024    MCH 25.2 (L) 02/02/2024    MCHC 29.7 (L) 02/02/2024    RDW 17.3 (H) 02/02/2024     02/02/2024       INR/PTT:  Lab Results   Component Value Date    INR 1.02 01/19/2024    PTT 28 01/19/2024       Diagnostic studies: Patient's imaging was personally reviewed and interpreted by me CT angiogram of the abdomen and pelvis on 1/22/2024 demonstrates successful occlusion of arterial supply to the large GDA pseudoaneurysm as well as successful embolization of proximal SMA branch pseudoaneurysm.  CT on 2/26/2024 demonstrates decreased size of thrombosed pseudoaneurysm.  Large pseudocyst persist.    Radiologist interpretation:    Impression:  1. Sequela of  pancreatitis with large pancreatic pseudocyst extending  into the gastric fundus and into the left Gerota's fascia causing  significant compression of the left kidney.   2. Postprocedural changes of embolization of the large gastroduodenal  and smaller jejunal branch pseudoaneurysms. No active extravasation  identified. No new new pseudoaneurysm identified.   3. Significant compression of the mesentricoportal and splenoportal  confluence, proximal portal vein and splenic vein resulting in  prominent superior mesenteric and gastrosplenic varices. D    I have personally reviewed the examination and initial interpretation  and I agree with the findings.     SLOANE MEDRANO MD     Assessment 28-year-old female with alcohol dependence and several episodes of alcohol induced pancreatitis, with development of a large gastroduodenal artery pseudoaneurysm and a smaller proximal SMA branch pseudoaneurysm, for which she required emergent transfer to [facility] for immediate management due to bleeding risk.  She underwent successful embolization of her pseudoaneurysms on 1/19/2024.  Since then, she has been doing well.  She is continuing to abstain from alcohol, now 59 days sober.  She denies abdominal pain, but does feel fullness from her larger pancreatic pseudocyst.  She is tolerating regular diet.  No signs of procedural complications.      Plan   -CT angiogram in 6 months.  -I encouraged her to pursue follow-up with the  complex pancreatitis service here as the team has been reaching out to her to try to establish follow-up without response.    It was a pleasure to conduct this in-person clinic visit with Ms. Lisa today.  Thank you for involving the interventional radiology service in her care.    Spent a total of 30 minutes face-to-face time on today's clinic visit, over 50% time was for counseling and care coordination.  In addition I spent 10 minutes reviewing imaging.    Darcy MOSCOSO  MD Dar  Interventional Radiology   Pager [phone]         CC  Patient Care Team:  System, Provider Not In as PCP - General (Clinic)  Dodie Lofton MD as Assigned PCP  POLY KNIGHT

## 2024-02-28 NOTE — LETTER
2/28/2024         RE: Rani Lisa  [address] [facility] [address]        Dear Colleague,    Thank you for referring your patient, Rani Lisa, to the [facility]. Please see a copy of my visit note below.        INTERVENTIONAL RADIOLOGY CONSULTATION    Name: Rani Lisa  Age: 28 year old   Referring Physician: Dr. Dodge   REASON FOR REFERRAL: Arterial pseudoaneurysms related to pancreatitis    HPI: Ms. Lisa is here for follow-up after her embolization of a large GDA as well as smaller proximal SMA branch pseudoaneurysm.    In summary, she has had several episodes of alcohol induced pancreatitis, with most severe episode last month. On a CT at OSH, a large GDA PSA was noted. She was transferred to [facility] for immediate management due to bleeding risk and underwnet entembolization of 2 separate PSAs on 1/19/2024. Followup CT on 1/22/2024 showed successful occlusion.  She was dischraged 3 days later.     She has been doing well. No current abdominal pain. She has been sober now 59 days. No abdomnal pain . She still frequesntly feels fullness and pressure from her pseudocyst. No fever, pain with eating (now on regular diet), no foamy diarrhea. She is having one BM er day, without blood. No nausea or vopmiting,;. No fever, jaundice or abdominal distention.    No lighheadedness or diaphoresis. No cough, dyspnea, chest pain or loeg swelling.    No groin pain or lump. No calf claudication.    PAST MEDICAL HISTORY:   Past Medical History:   Diagnosis Date    ADHD (attention deficit hyperactivity disorder)     Alcohol dependence (H)     Alcoholic pancreatitis     Hyperglycemia     Hypomagnesemia     Hyponatremia     Leukocytosis     Nexplanon insertion        PAST SURGICAL HISTORY:   Past Surgical History:   Procedure Laterality Date    IR VISCERAL ANGIOGRAM  1/19/2024       PROBLEM LIST:   Patient Active Problem List    Diagnosis Date Noted    Current moderate episode  of major depressive disorder without prior episode (H) 02/02/2024     Priority: Medium    Alcohol use disorder 01/23/2024     Priority: Medium    Fluid collection of pancreas 01/23/2024     Priority: Medium    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   Presenting with 3-day history of worsening abdominal pain associate with nausea and vomiting   Previous history of alcohol induced pancreatitis   Ongoing history of fairly heavy alcohol abuse   Transferred to this hospital from [place], lab work there showing elevated lipase 1786   CT imaging showing pancreatitis with pseudocyst   Admit inpatient, n.p.o., pain control, fluids   Will ask GI medicine to evaluate and suggest further treatment, monitor for worsening      Renal cyst 01/08/2023     Priority: Medium    Migraine without aura and without status migrainosus, not intractable 09/10/2022     Priority: Medium    Attention deficit hyperactivity disorder (ADHD), combined type 07/30/2020     Priority: Medium    LGSIL on Pap smear of cervix 11/19/2013     Priority: Medium     Formatting of this note might be different from the original.   LGSIL 2013   Ascus 2104   Neg 2016         MEDICATIONS:   Prescription Medications as of 2/28/2024         Rx Number Disp Refills Start End Last Dispensed Date Next Fill Date Owning Pharmacy    escitalopram (LEXAPRO) 10 MG tablet  -- -- 9/5/2023 --       Sig: Take 1 tablet by mouth every morning    Class: Historical    Route: Oral    folic acid (FOLVITE) 1 MG tablet  30 tablet 0 1/24/2024 --   [facility] - [place], [place] - [address] [place] [address]    Sig: Take 1 tablet (1 mg) by mouth daily    Class: E-Prescribe    Route: Oral    multivitamin w/minerals (MULTI-VITAMIN) tablet  -- --  --       Sig: Take 1  tablet by mouth daily    Class: Historical    Route: Oral    potassium aminobenzoate 500 MG CAPS capsule  -- --  --       Class: Historical    Route: Oral    thiamine (B-1) 100 MG tablet  30 tablet 0 1/24/2024 --   [address]    Sig: Take 1 tablet (100 mg) by mouth daily    Class: E-Prescribe    Route: Oral            ALLERGIES:   Bactrim [sulfamethoxazole-trimethoprim]    ROS:  As above      Physical Examination:   VITALS:   /79   Pulse 77   Temp 98  F (36.7  C) (Oral)   Resp 18   Wt 58.4 kg (128 lb 11.2 oz)   LMP 01/23/2024   SpO2 100%   BMI 21.42 kg/m    Constitutional: alert and no distress  Head: Normocephalic.   Cardiovascular: RRR< no murmur  Respiratory: CTAB  Gastrointestinal: Soft, NTTP, palpable mass left abdomen corresponding to pseudocyst  Skin: No jaundice  Psychiatric: affect flat and mentation appears normal.  Vascular: Right groin with no pain or palpable lump.  2/2 bilateral dorsalis pedis and posterior tibial pulses.    Labs:    BMP RESULTS:  Lab Results   Component Value Date     01/24/2024    POTASSIUM 4.1 01/24/2024    CHLORIDE 106 01/24/2024    CO2 22 01/24/2024    ANIONGAP 11 01/24/2024     (H) 01/24/2024     (H) 01/20/2024    BUN 6.0 01/24/2024    CR 0.60 01/24/2024    GFRESTIMATED >90 01/24/2024    DUSTIN 8.4 (L) 01/24/2024        CBC RESULTS:  Lab Results   Component Value Date    WBC 8.8 02/02/2024    RBC 3.53 (L) 02/02/2024    HGB 8.9 (L) 02/02/2024    HCT 30.0 (L) 02/02/2024    MCV 85 02/02/2024    MCH 25.2 (L) 02/02/2024    MCHC 29.7 (L) 02/02/2024    RDW 17.3 (H) 02/02/2024     02/02/2024       INR/PTT:  Lab Results   Component Value Date    INR 1.02 01/19/2024    PTT 28 01/19/2024       Diagnostic studies: Patient's imaging was personally reviewed and interpreted by me CT angiogram of the abdomen and pelvis on 1/22/2024 demonstrates successful occlusion of arterial supply to the large  GDA pseudoaneurysm as well as successful embolization of proximal SMA branch pseudoaneurysm.  CT on 2/26/2024 demonstrates decreased size of thrombosed pseudoaneurysm.  Large pseudocyst persist.    Radiologist interpretation:    Impression:  1. Sequela of pancreatitis with large pancreatic pseudocyst extending  into the gastric fundus and into the left Gerota's fascia causing  significant compression of the left kidney.   2. Postprocedural changes of embolization of the large gastroduodenal  and smaller jejunal branch pseudoaneurysms. No active extravasation  identified. No new new pseudoaneurysm identified.   3. Significant compression of the mesentricoportal and splenoportal  confluence, proximal portal vein and splenic vein resulting in  prominent superior mesenteric and gastrosplenic varices. D    I have personally reviewed the examination and initial interpretation  and I agree with the findings.     SLOANE MEDRANO MD     Assessment 28-year-old female with alcohol dependence and several episodes of alcohol induced pancreatitis, with development of a large gastroduodenal artery pseudoaneurysm and a smaller proximal SMA branch pseudoaneurysm, for which she required emergent transfer to [facility] for immediate management due to bleeding risk.  She underwent successful embolization of her pseudoaneurysms on 1/19/2024.  Since then, she has been doing well.  She is continuing to abstain from alcohol, now 59 days sober.  She denies abdominal pain, but does feel fullness from her larger pancreatic pseudocyst.  She is tolerating regular diet.  No signs of procedural complications.      Plan   -CT angiogram in 6 months.  -I encouraged her to pursue follow-up with the  complex pancreatitis service here as the team has been reaching out to her to try to establish follow-up without response.    It was a pleasure to conduct this in-person clinic visit with Ms. Lisa today.  Thank you for  involving the interventional radiology service in her care.    Spent a total of 30 minutes face-to-face time on today's clinic visit, over 50% time was for counseling and care coordination.  In addition I spent 10 minutes reviewing imaging.    Darcy Knight MD  Interventional Radiology   Pager [phone]         CC  Patient Care Team:  System, Provider Not In as PCP - General (Clinic)  Dodie Lofton MD as Assigned PCP  DARCY KNIGHT

## 2024-02-28 NOTE — PATIENT INSTRUCTIONS
Rayshawn,   You had your clinic visit with Dr. Dodge today. She would like to repeat the CT in 6 months with a clinic visit. Our schedulers will call you to set this up. Let me know if you have any questions or concerns in the meantime.   Thanks!    Shante Barnard RN  Nurse Care Coordinator-Interventional Radiology  [phone]

## 2024-03-10 ENCOUNTER — HEALTH MAINTENANCE LETTER (OUTPATIENT)
Age: 29
End: 2024-03-10

## 2024-03-13 ENCOUNTER — PATIENT OUTREACH (OUTPATIENT)
Dept: GASTROENTEROLOGY | Facility: CLINIC | Age: 29
End: 2024-03-13
Payer: COMMERCIAL

## 2024-03-13 NOTE — PROGRESS NOTES
Attempted to reach patient to discuss follow up CT scan and clinic visit with Dr. Ortiz. Patient was hospitalized in January with necrotizing pancreatitis. Unsuccessful in attempts to reach patient immediately following hospitalization, hopeful to reach now.     BANDARM with request to return call to clinic. Adam boyd.     Freda Davison, RN Care Coordinator

## 2024-03-19 ENCOUNTER — OFFICE VISIT (OUTPATIENT)
Dept: FAMILY MEDICINE | Facility: CLINIC | Age: 29
End: 2024-03-19
Payer: COMMERCIAL

## 2024-03-19 VITALS
RESPIRATION RATE: 14 BRPM | SYSTOLIC BLOOD PRESSURE: 116 MMHG | HEIGHT: 65 IN | OXYGEN SATURATION: 97 % | HEART RATE: 76 BPM | TEMPERATURE: 98.3 F | BODY MASS INDEX: 21.49 KG/M2 | DIASTOLIC BLOOD PRESSURE: 60 MMHG | WEIGHT: 129 LBS

## 2024-03-19 DIAGNOSIS — F32.4 MAJOR DEPRESSIVE DISORDER WITH SINGLE EPISODE, IN PARTIAL REMISSION (H): Primary | ICD-10-CM

## 2024-03-19 DIAGNOSIS — F90.9 ATTENTION DEFICIT HYPERACTIVITY DISORDER (ADHD), UNSPECIFIED ADHD TYPE: ICD-10-CM

## 2024-03-19 DIAGNOSIS — F10.21 ALCOHOL DEPENDENCE IN REMISSION (H): ICD-10-CM

## 2024-03-19 PROCEDURE — 99214 OFFICE O/P EST MOD 30 MIN: CPT | Performed by: STUDENT IN AN ORGANIZED HEALTH CARE EDUCATION/TRAINING PROGRAM

## 2024-03-19 RX ORDER — ESCITALOPRAM OXALATE 10 MG/1
10 TABLET ORAL EVERY MORNING
Qty: 90 TABLET | Refills: 3 | Status: SHIPPED | OUTPATIENT
Start: 2024-03-19

## 2024-03-19 RX ORDER — DEXTROAMPHETAMINE SACCHARATE, AMPHETAMINE ASPARTATE MONOHYDRATE, DEXTROAMPHETAMINE SULFATE AND AMPHETAMINE SULFATE 5; 5; 5; 5 MG/1; MG/1; MG/1; MG/1
20 CAPSULE, EXTENDED RELEASE ORAL DAILY
Qty: 30 CAPSULE | Refills: 0 | Status: SHIPPED | OUTPATIENT
Start: 2024-03-19 | End: 2024-04-14

## 2024-03-19 ASSESSMENT — ANXIETY QUESTIONNAIRES
1. FEELING NERVOUS, ANXIOUS, OR ON EDGE: NOT AT ALL
IF YOU CHECKED OFF ANY PROBLEMS ON THIS QUESTIONNAIRE, HOW DIFFICULT HAVE THESE PROBLEMS MADE IT FOR YOU TO DO YOUR WORK, TAKE CARE OF THINGS AT HOME, OR GET ALONG WITH OTHER PEOPLE: SOMEWHAT DIFFICULT
GAD7 TOTAL SCORE: 2
5. BEING SO RESTLESS THAT IT IS HARD TO SIT STILL: NOT AT ALL
4. TROUBLE RELAXING: NOT AT ALL
3. WORRYING TOO MUCH ABOUT DIFFERENT THINGS: SEVERAL DAYS
8. IF YOU CHECKED OFF ANY PROBLEMS, HOW DIFFICULT HAVE THESE MADE IT FOR YOU TO DO YOUR WORK, TAKE CARE OF THINGS AT HOME, OR GET ALONG WITH OTHER PEOPLE?: SOMEWHAT DIFFICULT
7. FEELING AFRAID AS IF SOMETHING AWFUL MIGHT HAPPEN: NOT AT ALL
GAD7 TOTAL SCORE: 2
2. NOT BEING ABLE TO STOP OR CONTROL WORRYING: NOT AT ALL
GAD7 TOTAL SCORE: 2
6. BECOMING EASILY ANNOYED OR IRRITABLE: SEVERAL DAYS
7. FEELING AFRAID AS IF SOMETHING AWFUL MIGHT HAPPEN: NOT AT ALL

## 2024-03-19 ASSESSMENT — PAIN SCALES - GENERAL: PAINLEVEL: NO PAIN (0)

## 2024-03-19 ASSESSMENT — PATIENT HEALTH QUESTIONNAIRE - PHQ9
SUM OF ALL RESPONSES TO PHQ QUESTIONS 1-9: 6
SUM OF ALL RESPONSES TO PHQ QUESTIONS 1-9: 6
10. IF YOU CHECKED OFF ANY PROBLEMS, HOW DIFFICULT HAVE THESE PROBLEMS MADE IT FOR YOU TO DO YOUR WORK, TAKE CARE OF THINGS AT HOME, OR GET ALONG WITH OTHER PEOPLE: SOMEWHAT DIFFICULT

## 2024-03-19 NOTE — PROGRESS NOTES
Assessment & Plan     Major depressive disorder with single episode, in partial remission (H24)  Patient is a very pleasant 28-year-old female who presents today for primarily ADHD.  As a separate part of this visit, she was given an interim refill of her Lexapro, and we refilled this moving forward.  She feels her mental health is stable from an anxiety and depression standpoint.  - escitalopram (LEXAPRO) 10 MG tablet  Dispense: 90 tablet; Refill: 3    Attention deficit hyperactivity disorder (ADHD), unspecified ADHD type  Regarding ADHD, patient has previously been on Adderall, most recently Adderall XR 30 mg.  She discontinued this while she was drinking heavily as she was already having poor oral intake, made worse with the Adderall.  Since discontinuing alcohol use, she is interested in going back on Adderall.  She is noticing many symptoms consistent with ADHD including inattention, difficulty with starting and finishing tasks, forgetfulness, occasionally racing thoughts.  Will start her at 20 mg of extended release, follow-up in 1 month.  If patient is doing well at this dosage in the interim, plan to refill for an additional 2 months, follow-up in 3 months instead.  If we are needing dose adjustment, plan to follow-up in 1 month as previously planned.  - amphetamine-dextroamphetamine (ADDERALL XR) 20 MG 24 hr capsule  Dispense: 30 capsule; Refill: 0  - PRIMARY CARE FOLLOW-UP SCHEDULING    Alcohol dependence in remission (H)  Regarding alcohol use, patient is over 70 days without alcohol.  Congratulated today.  Continue to support her sobriety.    I spent a total of 18 minutes on the day of the visit.   Time spent by me doing chart review, history and exam, documentation and further activities per the note    Subjective   Rayshawn is a 28 year old, presenting for the following health issues:  A.D.H.D        3/19/2024     1:51 PM   Additional Questions   Roomed by Mahogany HIGH   Accompanied by Self     History of  "Present Illness       Reason for visit:  Adhd medication    She eats 0-1 servings of fruits and vegetables daily.She consumes 2 sweetened beverage(s) daily.She exercises with enough effort to increase her heart rate 20 to 29 minutes per day.  She exercises with enough effort to increase her heart rate 3 or less days per week.   She is taking medications regularly.     Concern - Restart medication for ADHD  Onset: n/a  Description: used to take medication, has not for the past year  Progression of Symptoms:  noticing more  Previous history of similar problem: yes  Therapies tried and outcome: medication helped    On meds for a few  years.   Stopped when dealinng with etoh  Stopped because affecting her eating.     Adderall xr, 30 m g most recently  Eaisiuly distracted, running thoughts.   Trying to get started on a task is hard as well.   Once started, finishing is also a hard problem for her.   Will set it aside and forget. Affects home life and work life.           3/19/2024     1:37 PM   PHQ   PHQ-9 Total Score 6   Q9: Thoughts of better off dead/self-harm past 2 weeks Not at all         3/19/2024     1:38 PM   [id number] SCORE   Total Score 2 (minimal anxiety)   Total Score 2         Review of Systems  Constitutional, HEENT, cardiovascular, pulmonary, gi and gu systems are negative, except as otherwise noted.      Objective    /60 (BP Location: Right arm, Patient Position: Sitting, Cuff Size: Adult Regular)   Pulse 76   Temp 98.3  F (36.8  C) (Tympanic)   Resp 14   Ht 1.651 m (5' 5\")   Wt 58.5 kg (129 lb)   LMP 03/05/2024   SpO2 97%   BMI 21.47 kg/m    Body mass index is 21.47 kg/m .  Physical Exam  Constitutional:       Appearance: Normal appearance.   HENT:      Head: Normocephalic.   Eyes:      General: No scleral icterus.     Extraocular Movements: Extraocular movements intact.      Conjunctiva/sclera: Conjunctivae normal.   Cardiovascular:      Rate and Rhythm: Normal rate.   Pulmonary:      Effort: " Pulmonary effort is normal.   Neurological:      General: No focal deficit present.      Mental Status: She is alert and oriented to person, place, and time.                Signed Electronically by: Dodie Lofton MD

## 2024-03-19 NOTE — LETTER
[facility]  03/19/24  Patient: Rani Lisa  YOB: 1995  Medical Record Number: [id number]                                                                                  Non-Opioid Controlled Substance Agreement    This is an agreement between you and your provider regarding safe and appropriate use of controlled substances prescribed by your care team. Controlled substances are?medicines that can cause physical and mental dependence (abuse).     There are strict laws about having and using these medicines. We here at [facility] are  committed to working with you in your efforts to get better. To support you in this work, we'll help you schedule regular office appointments for medicine refills. If we must cancel or change your appointment for any reason, we'll make sure you have enough medicine to last until your next appointment.     As a Provider, I will:   Listen carefully to your concerns while treating you with respect.   Recommend a treatment plan that I believe is in your best interest and may involve therapies other than medicine.    Talk with you often about the possible benefits and the risk of harm of any medicine that we prescribe for you.  Assess the safety of this medicine and check how well it works.    Provide a plan on how to taper (discontinue or go off) using this medicine if the decision is made to stop its use.      ::  As a Patient, I understand controlled substances:     Are prescribed by my care provider to help me function or work and manage my condition(s).?  Are strong medicines and can cause serious side effects.     Need to be taken exactly as prescribed.?Combining controlled substances with certain medicines or chemicals (such as illegal drugs, alcohol, sedatives, sleeping pills, and benzodiazepines) can be dangerous or even fatal.? If I stop taking my medicines suddenly, I may have severe withdrawal symptoms.     The risks,  benefits, and side effects of these medicine(s) were explained to me. I agree that:    I will take part in other treatments as advised by my care team. This may be psychiatry or counseling, physical therapy, behavioral therapy, group treatment or a referral to specialist.    I will keep all my appointments and understand this is part of the monitoring of controlled substances.?My care team may require an office visit for EVERY controlled substance refill. If I miss appointments or don t follow instructions, my care team may stop my medicine    I will take my medicines as prescribed. I will not change the dose or schedule unless my care team tells me to. There will be no refills if I run out early.      I may be asked to come to the clinic and complete a urine drug test or complete a pill count. If I don t give a urine sample or participate in a pill count, the care team may stop my medicine.    I will only receive controlled substance prescriptions from this clinic. If I am treated by another provider, I will tell them that I am taking controlled substances and that I have a treatment agreement with this provider. I will inform my [facility] care team within one business day if I am given a prescription for any controlled substance by another healthcare provider. My [facility] care team can contact other providers and pharmacists about my use of any medicines.    It is up to me to make sure that I don't run out of my medicines on weekends or holidays.?If my care team is willing to refill my prescription without a visit, I must request refills only during office hours. Refills may take up to 3 business days to process. I will use one pharmacy to fill all my controlled substance prescriptions. I will notify the clinic about any changes to my insurance or medicine availability.    I am responsible for my prescriptions. If the medicine/prescription is lost, stolen or destroyed, it will not be replaced.?I  also agree not to share controlled substance medicines with anyone.     I am aware I should not use any illegal or recreational drugs. I agree not to drink alcohol unless my care team says I can.     If I enroll in the [place] Medical Cannabis program, I will tell my care team before my next refill.    I will tell my care team right away if I become pregnant, have a new medical problem treated outside of my regular clinic, or have a change in my medicines.     I understand that this medicine can affect my thinking, judgment and reaction time.? Alcohol and drugs affect the brain and body, which can affect the safety of my driving. Being under the influence of alcohol or drugs can affect my decision-making, behaviors, personal safety and the safety of others. Driving while impaired (DWI) can occur if a person is driving, operating or in physical control of a car, motorcycle, boat, snowmobile, ATV, motorbike, off-road vehicle or any other motor vehicle ([place] Statute 169A.20). I understand the risk if I choose to drive or operate any vehicle or machinery.    I understand that if I do not follow any of the conditions above, my prescriptions or treatment may be stopped or changed.   I agree that my provider, clinic care team and pharmacy may work with any city, state or federal law enforcement agency that investigates the misuse, sale or other diversion of my controlled medicine. I will allow my provider to discuss my care with, or share a copy of, this agreement with any other treating provider, pharmacy or emergency room where I receive care.     I have read this agreement and have asked questions about anything I did not understand.    ________________________________________________________  Patient Signature - Rani Lisa     ___________________                   Date     ________________________________________________________  Provider Signature - Dodie Lofton MD       ___________________                    Date     ________________________________________________________  Witness Signature (required if provider not present while patient signing)          ___________________                   Date

## 2024-04-14 ENCOUNTER — MYC REFILL (OUTPATIENT)
Dept: FAMILY MEDICINE | Facility: CLINIC | Age: 29
End: 2024-04-14
Payer: COMMERCIAL

## 2024-04-14 DIAGNOSIS — F90.9 ATTENTION DEFICIT HYPERACTIVITY DISORDER (ADHD), UNSPECIFIED ADHD TYPE: ICD-10-CM

## 2024-04-15 RX ORDER — DEXTROAMPHETAMINE SACCHARATE, AMPHETAMINE ASPARTATE MONOHYDRATE, DEXTROAMPHETAMINE SULFATE AND AMPHETAMINE SULFATE 6.25; 6.25; 6.25; 6.25 MG/1; MG/1; MG/1; MG/1
25 CAPSULE, EXTENDED RELEASE ORAL DAILY
Qty: 8 CAPSULE | Refills: 0 | Status: SHIPPED | OUTPATIENT
Start: 2024-04-15 | End: 2024-04-22

## 2024-04-18 ENCOUNTER — TELEPHONE (OUTPATIENT)
Dept: FAMILY MEDICINE | Facility: CLINIC | Age: 29
End: 2024-04-18
Payer: COMMERCIAL

## 2024-04-18 NOTE — TELEPHONE ENCOUNTER
Patient Quality Outreach    Patient is due for the following:   Cervical Cancer Screening - PAP Needed  Depression  -  Depression follow-up visit    Next Steps:   Patient has upcoming appointment, these items will be addressed at that time.    Type of outreach:    Chart review performed, no outreach needed.      Questions for provider review:    None           Mahogany Crawford MA

## 2024-04-22 ENCOUNTER — OFFICE VISIT (OUTPATIENT)
Dept: FAMILY MEDICINE | Facility: CLINIC | Age: 29
End: 2024-04-22
Payer: COMMERCIAL

## 2024-04-22 VITALS
BODY MASS INDEX: 21.49 KG/M2 | HEIGHT: 65 IN | RESPIRATION RATE: 14 BRPM | OXYGEN SATURATION: 97 % | HEART RATE: 106 BPM | TEMPERATURE: 97.9 F | DIASTOLIC BLOOD PRESSURE: 62 MMHG | SYSTOLIC BLOOD PRESSURE: 122 MMHG | WEIGHT: 129 LBS

## 2024-04-22 DIAGNOSIS — F90.9 ATTENTION DEFICIT HYPERACTIVITY DISORDER (ADHD), UNSPECIFIED ADHD TYPE: ICD-10-CM

## 2024-04-22 DIAGNOSIS — Z79.899 ENCOUNTER FOR LONG-TERM (CURRENT) USE OF MEDICATIONS: Primary | ICD-10-CM

## 2024-04-22 LAB — CREAT UR-MCNC: 35 MG/DL

## 2024-04-22 PROCEDURE — G0481 DRUG TEST DEF 8-14 CLASSES: HCPCS | Performed by: STUDENT IN AN ORGANIZED HEALTH CARE EDUCATION/TRAINING PROGRAM

## 2024-04-22 PROCEDURE — 99213 OFFICE O/P EST LOW 20 MIN: CPT | Performed by: STUDENT IN AN ORGANIZED HEALTH CARE EDUCATION/TRAINING PROGRAM

## 2024-04-22 RX ORDER — DEXTROAMPHETAMINE SACCHARATE, AMPHETAMINE ASPARTATE MONOHYDRATE, DEXTROAMPHETAMINE SULFATE AND AMPHETAMINE SULFATE 6.25; 6.25; 6.25; 6.25 MG/1; MG/1; MG/1; MG/1
25 CAPSULE, EXTENDED RELEASE ORAL DAILY
Qty: 30 CAPSULE | Refills: 0 | Status: SHIPPED | OUTPATIENT
Start: 2024-04-22 | End: 2024-06-03

## 2024-04-22 ASSESSMENT — ANXIETY QUESTIONNAIRES
GAD7 TOTAL SCORE: 0
2. NOT BEING ABLE TO STOP OR CONTROL WORRYING: NOT AT ALL
6. BECOMING EASILY ANNOYED OR IRRITABLE: NOT AT ALL
1. FEELING NERVOUS, ANXIOUS, OR ON EDGE: NOT AT ALL
GAD7 TOTAL SCORE: 0
GAD7 TOTAL SCORE: 0
5. BEING SO RESTLESS THAT IT IS HARD TO SIT STILL: NOT AT ALL
8. IF YOU CHECKED OFF ANY PROBLEMS, HOW DIFFICULT HAVE THESE MADE IT FOR YOU TO DO YOUR WORK, TAKE CARE OF THINGS AT HOME, OR GET ALONG WITH OTHER PEOPLE?: NOT DIFFICULT AT ALL
7. FEELING AFRAID AS IF SOMETHING AWFUL MIGHT HAPPEN: NOT AT ALL
IF YOU CHECKED OFF ANY PROBLEMS ON THIS QUESTIONNAIRE, HOW DIFFICULT HAVE THESE PROBLEMS MADE IT FOR YOU TO DO YOUR WORK, TAKE CARE OF THINGS AT HOME, OR GET ALONG WITH OTHER PEOPLE: NOT DIFFICULT AT ALL
3. WORRYING TOO MUCH ABOUT DIFFERENT THINGS: NOT AT ALL
7. FEELING AFRAID AS IF SOMETHING AWFUL MIGHT HAPPEN: NOT AT ALL
4. TROUBLE RELAXING: NOT AT ALL

## 2024-04-22 ASSESSMENT — PATIENT HEALTH QUESTIONNAIRE - PHQ9
10. IF YOU CHECKED OFF ANY PROBLEMS, HOW DIFFICULT HAVE THESE PROBLEMS MADE IT FOR YOU TO DO YOUR WORK, TAKE CARE OF THINGS AT HOME, OR GET ALONG WITH OTHER PEOPLE: NOT DIFFICULT AT ALL
SUM OF ALL RESPONSES TO PHQ QUESTIONS 1-9: 0
SUM OF ALL RESPONSES TO PHQ QUESTIONS 1-9: 0

## 2024-04-22 ASSESSMENT — PAIN SCALES - GENERAL: PAINLEVEL: NO PAIN (1)

## 2024-04-22 NOTE — PROGRESS NOTES
Assessment & Plan     Patient is a very pleasant 28-year-old female who presents today for follow-up on ADHD.  In the interim since her last visit, she was noticing that her symptoms were improved, but not lasting long enough throughout the day, so we did opt to increase slightly to 25 mg extended release.  She does not feel that she is been able to get that dosage good enough to try, plan to continue with the dosage for now, will update me over MyChart in the next 1 to 2 weeks.  See below for medication adjustment options as discussed during her visit today.  Follow-up 1 month.    Continue 25 mg XR for now, update me when you feel you've had a better chance to try it.   If it is causing insomnia, we will back down to 20 mg XR and add late morning or early afternoon immediate release Adderall to get you through the afternoon.   If not causing side effects, but not enough, increase to 30 mg XR, for another few weeks, and then reevaluate at that time.     Encounter for long-term (current) use of medications  - [id number] - Urine Drug Confirmation Panel (Comprehensive)    Attention deficit hyperactivity disorder (ADHD), unspecified ADHD type  - amphetamine-dextroamphetamine (ADDERALL XR) 25 MG 24 hr capsule  Dispense: 30 capsule; Refill: 0  - PRIMARY CARE FOLLOW-UP SCHEDULING      Subjective   Rayshawn is a 28 year old, presenting for the following health issues:  A.D.H.D        4/22/2024    12:56 PM   Additional Questions   Roomed by Mahogany HIGH   Accompanied by Self     History of Present Illness       Reason for visit:  Med check up    She eats 0-1 servings of fruits and vegetables daily.She consumes 2 sweetened beverage(s) daily.She exercises with enough effort to increase her heart rate 9 or less minutes per day.  She exercises with enough effort to increase her heart rate 4 days per week.   She is taking medications regularly.     Medication Followup of Adderall Xr  Taking Medication as prescribed: yes  Side Effects:   "None  Medication Helping Symptoms:  yes    A lot of running around this past week.   Feels the 25 is helping more.  last dose this morning        Review of Systems  Constitutional, HEENT, cardiovascular, pulmonary, gi and gu systems are negative, except as otherwise noted.      Objective    /62 (BP Location: Right arm, Patient Position: Sitting, Cuff Size: Adult Regular)   Pulse 106   Temp 97.9  F (36.6  C) (Tympanic)   Resp 14   Ht 1.651 m (5' 5\")   Wt 58.5 kg (129 lb)   LMP 04/14/2024   SpO2 97%   BMI 21.47 kg/m    Body mass index is 21.47 kg/m .  Physical Exam  Constitutional:       Appearance: Normal appearance.   HENT:      Head: Normocephalic.   Eyes:      General: No scleral icterus.     Extraocular Movements: Extraocular movements intact.      Conjunctiva/sclera: Conjunctivae normal.   Cardiovascular:      Rate and Rhythm: Normal rate.   Pulmonary:      Effort: Pulmonary effort is normal.   Musculoskeletal:         General: Normal range of motion.      Cervical back: Normal range of motion.   Neurological:      General: No focal deficit present.      Mental Status: She is alert and oriented to person, place, and time.           Results from this visitNo results found for any visits on 04/22/24.            Signed Electronically by: Dodie Lofton MD    "

## 2024-04-22 NOTE — PATIENT INSTRUCTIONS
Continue 25 mg XR for now, update me when you feel you've had a better chance to try it.   If it is causing insomnia, we will back down to 20 mg XR and add late morning or early afternoon immediate release Adderall to get you through the afternoon.   If not causing side effects, but not enough, increase to 30 mg XR, for another few weeks, and then reevaluate at that time.

## 2024-04-24 LAB
AMPHET UR CFM-MCNC: 3680 NG/ML
AMPHET/CREAT UR: ABNORMAL NG/MG {CREAT}
N-NORTRAMADOL/CREAT UR CFM: 6171 NG/MG {CREAT}
O-NORTRAMADOL UR CFM-MCNC: 2160 NG/ML
TRAMADOL CTO UR CFM-MCNC: 677 NG/ML
TRAMADOL/CREAT UR: 1934 NG/MG {CREAT}

## 2024-04-25 ENCOUNTER — TELEPHONE (OUTPATIENT)
Dept: RADIOLOGY | Facility: CLINIC | Age: 29
End: 2024-04-25
Payer: COMMERCIAL

## 2024-04-25 NOTE — CONFIDENTIAL NOTE
This was the 3rd/Max attempt to schedule pt for CTA prior to Dr. Dodge in August. The 1st attempt was through LVM and a MC message. The 2nd attempt through a MC message. I left a voice mail informing the pt that this would be the last attempt to reach her. I can see that the pt has read the messages sent to her so I asked that if she is wanting or not wanting to schedule to please let us know. I let her know that we cancelled the visit with Dr. Dodge because the CTA is needed prior to the visit. The scheduling line number was provided. A letter of max attempts notice will be mailed as well.

## 2024-06-03 ENCOUNTER — OFFICE VISIT (OUTPATIENT)
Dept: FAMILY MEDICINE | Facility: CLINIC | Age: 29
End: 2024-06-03
Attending: STUDENT IN AN ORGANIZED HEALTH CARE EDUCATION/TRAINING PROGRAM
Payer: COMMERCIAL

## 2024-06-03 VITALS
HEIGHT: 65 IN | HEART RATE: 109 BPM | TEMPERATURE: 99 F | BODY MASS INDEX: 20.56 KG/M2 | DIASTOLIC BLOOD PRESSURE: 88 MMHG | WEIGHT: 123.4 LBS | RESPIRATION RATE: 22 BRPM | SYSTOLIC BLOOD PRESSURE: 144 MMHG | OXYGEN SATURATION: 100 %

## 2024-06-03 DIAGNOSIS — K86.3 PSEUDOCYST OF PANCREAS: ICD-10-CM

## 2024-06-03 DIAGNOSIS — F90.9 ATTENTION DEFICIT HYPERACTIVITY DISORDER (ADHD), UNSPECIFIED ADHD TYPE: Primary | ICD-10-CM

## 2024-06-03 DIAGNOSIS — K85.90 RECURRENT ACUTE PANCREATITIS: ICD-10-CM

## 2024-06-03 PROBLEM — F10.90 ALCOHOL USE DISORDER: Status: RESOLVED | Noted: 2024-01-23 | Resolved: 2024-06-03

## 2024-06-03 PROCEDURE — 99214 OFFICE O/P EST MOD 30 MIN: CPT | Performed by: STUDENT IN AN ORGANIZED HEALTH CARE EDUCATION/TRAINING PROGRAM

## 2024-06-03 RX ORDER — LISDEXAMFETAMINE DIMESYLATE 30 MG/1
30 CAPSULE ORAL EVERY MORNING
Qty: 30 CAPSULE | Refills: 0 | Status: SHIPPED | OUTPATIENT
Start: 2024-08-02 | End: 2024-09-03

## 2024-06-03 RX ORDER — LISDEXAMFETAMINE DIMESYLATE 10 MG/1
10 CAPSULE ORAL EVERY MORNING
Qty: 30 CAPSULE | Refills: 0 | Status: SHIPPED | OUTPATIENT
Start: 2024-06-03 | End: 2024-09-11

## 2024-06-03 RX ORDER — LISDEXAMFETAMINE DIMESYLATE 20 MG/1
20 CAPSULE ORAL EVERY MORNING
Qty: 30 CAPSULE | Refills: 0 | Status: SHIPPED | OUTPATIENT
Start: 2024-07-03 | End: 2024-09-11

## 2024-06-03 ASSESSMENT — ANXIETY QUESTIONNAIRES
GAD7 TOTAL SCORE: 7
IF YOU CHECKED OFF ANY PROBLEMS ON THIS QUESTIONNAIRE, HOW DIFFICULT HAVE THESE PROBLEMS MADE IT FOR YOU TO DO YOUR WORK, TAKE CARE OF THINGS AT HOME, OR GET ALONG WITH OTHER PEOPLE: SOMEWHAT DIFFICULT
6. BECOMING EASILY ANNOYED OR IRRITABLE: NOT AT ALL
1. FEELING NERVOUS, ANXIOUS, OR ON EDGE: SEVERAL DAYS
GAD7 TOTAL SCORE: 7
8. IF YOU CHECKED OFF ANY PROBLEMS, HOW DIFFICULT HAVE THESE MADE IT FOR YOU TO DO YOUR WORK, TAKE CARE OF THINGS AT HOME, OR GET ALONG WITH OTHER PEOPLE?: SOMEWHAT DIFFICULT
5. BEING SO RESTLESS THAT IT IS HARD TO SIT STILL: SEVERAL DAYS
7. FEELING AFRAID AS IF SOMETHING AWFUL MIGHT HAPPEN: SEVERAL DAYS
GAD7 TOTAL SCORE: 7
3. WORRYING TOO MUCH ABOUT DIFFERENT THINGS: SEVERAL DAYS
4. TROUBLE RELAXING: MORE THAN HALF THE DAYS
7. FEELING AFRAID AS IF SOMETHING AWFUL MIGHT HAPPEN: SEVERAL DAYS
2. NOT BEING ABLE TO STOP OR CONTROL WORRYING: SEVERAL DAYS

## 2024-06-03 ASSESSMENT — PATIENT HEALTH QUESTIONNAIRE - PHQ9
10. IF YOU CHECKED OFF ANY PROBLEMS, HOW DIFFICULT HAVE THESE PROBLEMS MADE IT FOR YOU TO DO YOUR WORK, TAKE CARE OF THINGS AT HOME, OR GET ALONG WITH OTHER PEOPLE: SOMEWHAT DIFFICULT
SUM OF ALL RESPONSES TO PHQ QUESTIONS 1-9: 8
SUM OF ALL RESPONSES TO PHQ QUESTIONS 1-9: 8

## 2024-06-03 ASSESSMENT — PAIN SCALES - GENERAL: PAINLEVEL: NO PAIN (0)

## 2024-06-03 NOTE — PROGRESS NOTES
Assessment & Plan     Attention deficit hyperactivity disorder (ADHD), unspecified ADHD type  Patient is a 28-year-old female who presents today for follow-up on ADHD.  Unfortunately, in the interim since her last visit, she has been hospitalized twice for acute pancreatitis.  See below for more details with regards to this.  At her last visit, we did do a urine drug screen which supposedly showed tramadol.  She does not recall having taken that specifically, but does remember that her grandmother had given her something for muscle aches, sounds like it was likely this tramadol.  Patient has had multiple urine drug screens in the past few years during recent hospitalizations for pancreatitis and has not been noted in the past to have other opioids with the exception of drug screens obtained after opioid administration during hospital stay.  Low concern for drug misuse.  She previously done well on Vyvanse, we did opt to switch her back to this with a slow taper up.    See below for more details, however the plan is to take a break off stimulants for another couple weeks prior to initiating Vyvanse again for concern that Adderall may have been contributing to acute pancreatitis flares, though low risk.  Follow-up in 1 month for recheck on ADHD and pancreatitis.  - PRIMARY CARE FOLLOW-UP SCHEDULING  - lisdexamfetamine (VYVANSE) 10 MG capsule  Dispense: 30 capsule; Refill: 0  - lisdexamfetamine (VYVANSE) 20 MG capsule  Dispense: 30 capsule; Refill: 0  - lisdexamfetamine (VYVANSE) 30 MG capsule  Dispense: 30 capsule; Refill: 0  - PRIMARY CARE FOLLOW-UP SCHEDULING    Recurrent acute pancreatitis  Pseudocyst of pancreas  Patient has had recurrent episodes of pancreatitis since November 2020.  Initial episodes were most likely related to alcohol use, though she has not had any alcohol consumption since January.  Dates include November 2020, June 2021, October 2021, September 2022, January 2023, January 2, 2024.  Prior to  this hospital stay, patient discontinued alcohol use.  Then again had episodes on 1/19/2024, started Adderall on 3/19/2024, then hospitalizations 4/10/2024, 4/28/2024, and 5/18/2024.  She has been noted to have multiple pseudocysts of the pancreas, no signs of necrosis or abscess.  Plans based on recent hospitalization include upcoming CT and other imaging with GI follow-up. Triglycerides are not severely elevated.     Of note, patient's blood pressure is elevated today, likely due to recent increase stressors including the loss of her grandmother.    Jame Tabares is a 28 year old, presenting for the following health issues:  Results (Discuss urine drug screen results)        6/3/2024     1:47 PM   Additional Questions   Roomed by Linda ROMO CMA     History of Present Illness       Reason for visit:  Med check up  Symptoms include:  Patient was taken off of Adderall-unsure if related to hospitalizations or not-would like to discuss something else to help with ADHD    She eats 2-3 servings of fruits and vegetables daily.She consumes 2 sweetened beverage(s) daily.She exercises with enough effort to increase her heart rate 60 or more minutes per day.  She exercises with enough effort to increase her heart rate 5 days per week.   She is taking medications regularly.     Just lost grandma this past weekend.     Hospital Follow-up Visit:    Hospital/Nursing Home/IP Rehab Facility:  [facility]  Date of Admission: 05/18/2024  Date of Discharge: 05/19/2024-Most recent time  Reason(s) for Admission: Acute Pancreatitis  Was the patient in the ICU or did the patient experience delirium during hospitalization?  No  Do you have any other stressors you would like to discuss with your provider? No    Problems taking medications regularly:  None  Medication changes since discharge: Discontinued Adderall  Problems adhering to non-medication therapy:  None    Summary of hospitalization:  CareEverywhere information  "obtained and reviewed  Diagnostic Tests/Treatments reviewed.  Follow up needed: none  Other Healthcare Providers Involved in Patient s Care:         Specialist appointment - GI  Update since discharge: improved.       Hasn't had much for pain or symptoms of actue pancreatitis since last hospitalization and hasn't had adderall (since last hospital stay)    Plan of care communicated with patient               Review of Systems  Constitutional, HEENT, cardiovascular, pulmonary, gi and gu systems are negative, except as otherwise noted.      Objective    BP (!) 144/88 (BP Location: Right arm, Patient Position: Sitting, Cuff Size: Adult Regular)   Pulse 109   Temp 99  F (37.2  C) (Tympanic)   Resp 22   Ht 1.651 m (5' 5\")   Wt 56 kg (123 lb 6.4 oz)   LMP 04/14/2024   SpO2 100%   BMI 20.53 kg/m    Body mass index is 20.53 kg/m .  Physical Exam  Constitutional:       Appearance: Normal appearance.   HENT:      Head: Normocephalic.   Eyes:      General: No scleral icterus.     Extraocular Movements: Extraocular movements intact.      Conjunctiva/sclera: Conjunctivae normal.   Cardiovascular:      Rate and Rhythm: Normal rate.   Pulmonary:      Effort: Pulmonary effort is normal.   Neurological:      General: No focal deficit present.      Mental Status: She is alert and oriented to person, place, and time.   Psychiatric:         Mood and Affect: Affect is tearful.                Signed Electronically by: Dodie Lofton MD    "

## 2024-06-17 ENCOUNTER — MYC REFILL (OUTPATIENT)
Dept: FAMILY MEDICINE | Facility: CLINIC | Age: 29
End: 2024-06-17
Payer: COMMERCIAL

## 2024-06-17 DIAGNOSIS — F32.4 MAJOR DEPRESSIVE DISORDER WITH SINGLE EPISODE, IN PARTIAL REMISSION (H): ICD-10-CM

## 2024-06-17 RX ORDER — ESCITALOPRAM OXALATE 10 MG/1
10 TABLET ORAL EVERY MORNING
Qty: 90 TABLET | Refills: 3 | OUTPATIENT
Start: 2024-06-17

## 2024-09-03 ENCOUNTER — MYC REFILL (OUTPATIENT)
Dept: FAMILY MEDICINE | Facility: CLINIC | Age: 29
End: 2024-09-03
Payer: COMMERCIAL

## 2024-09-03 DIAGNOSIS — F90.9 ATTENTION DEFICIT HYPERACTIVITY DISORDER (ADHD), UNSPECIFIED ADHD TYPE: ICD-10-CM

## 2024-09-03 RX ORDER — LISDEXAMFETAMINE DIMESYLATE 30 MG/1
30 CAPSULE ORAL EVERY MORNING
Qty: 7 CAPSULE | Refills: 0 | Status: SHIPPED | OUTPATIENT
Start: 2024-09-03

## 2024-09-11 ENCOUNTER — MYC REFILL (OUTPATIENT)
Dept: FAMILY MEDICINE | Facility: CLINIC | Age: 29
End: 2024-09-11
Payer: COMMERCIAL

## 2024-09-11 DIAGNOSIS — F90.9 ATTENTION DEFICIT HYPERACTIVITY DISORDER (ADHD), UNSPECIFIED ADHD TYPE: ICD-10-CM

## 2024-09-11 RX ORDER — LISDEXAMFETAMINE DIMESYLATE 20 MG/1
20 CAPSULE ORAL EVERY MORNING
Qty: 30 CAPSULE | Refills: 0 | Status: SHIPPED | OUTPATIENT
Start: 2024-09-11

## 2024-09-11 RX ORDER — LISDEXAMFETAMINE DIMESYLATE 10 MG/1
10 CAPSULE ORAL
Qty: 30 CAPSULE | Refills: 0 | Status: SHIPPED | OUTPATIENT
Start: 2024-09-11

## 2024-09-11 RX ORDER — LISDEXAMFETAMINE DIMESYLATE 30 MG/1
30 CAPSULE ORAL EVERY MORNING
Qty: 7 CAPSULE | Refills: 0 | Status: CANCELLED | OUTPATIENT
Start: 2024-09-11

## 2024-09-23 ENCOUNTER — VIRTUAL VISIT (OUTPATIENT)
Dept: FAMILY MEDICINE | Facility: CLINIC | Age: 29
End: 2024-09-23
Payer: COMMERCIAL

## 2024-09-23 DIAGNOSIS — F90.9 ATTENTION DEFICIT HYPERACTIVITY DISORDER (ADHD), UNSPECIFIED ADHD TYPE: ICD-10-CM

## 2024-09-23 PROCEDURE — 99213 OFFICE O/P EST LOW 20 MIN: CPT | Mod: 95 | Performed by: STUDENT IN AN ORGANIZED HEALTH CARE EDUCATION/TRAINING PROGRAM

## 2024-09-23 RX ORDER — LISDEXAMFETAMINE DIMESYLATE 10 MG/1
10 CAPSULE ORAL
Qty: 30 CAPSULE | Refills: 0 | Status: SHIPPED | OUTPATIENT
Start: 2024-09-23 | End: 2024-10-23

## 2024-09-23 RX ORDER — LISDEXAMFETAMINE DIMESYLATE 20 MG/1
20 CAPSULE ORAL EVERY MORNING
Qty: 30 CAPSULE | Refills: 0 | Status: SHIPPED | OUTPATIENT
Start: 2024-09-23 | End: 2024-10-23

## 2024-09-23 RX ORDER — LISDEXAMFETAMINE DIMESYLATE 20 MG/1
20 CAPSULE ORAL EVERY MORNING
Qty: 30 CAPSULE | Refills: 0 | Status: SHIPPED | OUTPATIENT
Start: 2024-11-22 | End: 2024-12-22

## 2024-09-23 RX ORDER — LISDEXAMFETAMINE DIMESYLATE 10 MG/1
10 CAPSULE ORAL
Qty: 30 CAPSULE | Refills: 0 | Status: SHIPPED | OUTPATIENT
Start: 2024-11-24 | End: 2024-12-24

## 2024-09-23 RX ORDER — LISDEXAMFETAMINE DIMESYLATE 20 MG/1
20 CAPSULE ORAL EVERY MORNING
Qty: 30 CAPSULE | Refills: 0 | Status: SHIPPED | OUTPATIENT
Start: 2024-10-23 | End: 2024-11-22

## 2024-09-23 RX ORDER — LISDEXAMFETAMINE DIMESYLATE 10 MG/1
10 CAPSULE ORAL
Qty: 30 CAPSULE | Refills: 0 | Status: SHIPPED | OUTPATIENT
Start: 2024-10-24 | End: 2024-11-23

## 2024-09-23 ASSESSMENT — ANXIETY QUESTIONNAIRES
GAD7 TOTAL SCORE: 0
7. FEELING AFRAID AS IF SOMETHING AWFUL MIGHT HAPPEN: NOT AT ALL
8. IF YOU CHECKED OFF ANY PROBLEMS, HOW DIFFICULT HAVE THESE MADE IT FOR YOU TO DO YOUR WORK, TAKE CARE OF THINGS AT HOME, OR GET ALONG WITH OTHER PEOPLE?: NOT DIFFICULT AT ALL
GAD7 TOTAL SCORE: 0
GAD7 TOTAL SCORE: 0

## 2024-09-23 NOTE — PROGRESS NOTES
Rayshawn is a 28 year old who is being evaluated via a billable video visit.    How would you like to obtain your AVS? MyChart  If the video visit is dropped, the invitation should be resent by: Text to cell phone: [phone]  Will anyone else be joining your video visit? No      Assessment & Plan     Attention deficit hyperactivity disorder (ADHD), unspecified ADHD type  Patient is a very pleasant 28-year-old female who presents today for follow-up on ADHD.  She unfortunately missed our previously scheduled appointment due to a scheduling error.  We did follow-up today, is doing well on 20 mg every morning, 10 mg midday of Vyvanse.  Refill x 3 months, follow-up at that time for refill.  - PRIMARY CARE FOLLOW-UP SCHEDULING; Future  - lisdexamfetamine (VYVANSE) 20 MG capsule; Take 1 capsule (20 mg) by mouth every morning.  - lisdexamfetamine (VYVANSE) 20 MG capsule; Take 1 capsule (20 mg) by mouth every morning.  - lisdexamfetamine (VYVANSE) 20 MG capsule; Take 1 capsule (20 mg) by mouth every morning.  - lisdexamfetamine (VYVANSE) 10 MG capsule; Take 1 capsule (10 mg) by mouth daily (with lunch).  - lisdexamfetamine (VYVANSE) 10 MG capsule; Take 1 capsule (10 mg) by mouth daily (with lunch).  - lisdexamfetamine (VYVANSE) 10 MG capsule; Take 1 capsule (10 mg) by mouth daily (with lunch).      Subjective   Rayshawn is a 28 year old, presenting for the following health issues:  A.D.H.D        9/23/2024    10:59 AM   Additional Questions   Roomed by Mahogany HIGH   Accompanied by Self     History of Present Illness       Reason for visit:  Med check up   She is taking medications regularly.       Medication Followup of Vyvanse  Taking Medication as prescribed: yes  Side Effects:  None  Medication Helping Symptoms:  yes    Doing a lot better since we changed med doses.   Does 20 mg qam and 10 mg  lunch and has been helping manage symptoms and avoid side effects.       Review of Systems  Constitutional, HEENT, cardiovascular,  pulmonary, gi and gu systems are negative, except as otherwise noted.      Objective           Vitals:  No vitals were obtained today due to virtual visit.    Physical Exam   GENERAL: alert and no distress  EYES: Eyes grossly normal to inspection.  No discharge or erythema, or obvious scleral/conjunctival abnormalities.  RESP: No audible wheeze, cough, or visible cyanosis.    SKIN: Visible skin clear. No significant rash, abnormal pigmentation or lesions.  NEURO: Cranial nerves grossly intact.  Mentation and speech appropriate for age.  PSYCH: Appropriate affect, tone, and pace of words          Video-Visit Details    Type of service:  Video Visit   Originating Location (pt. Location): Home    Distant Location (provider location):  On-site  Platform used for Video Visit: Naina  Signed Electronically by: Dodie Lofton MD

## 2024-12-16 ENCOUNTER — HOSPITAL ENCOUNTER (OUTPATIENT)
Dept: CT IMAGING | Facility: CLINIC | Age: 29
Discharge: HOME | End: 2024-12-16
Attending: RADIOLOGY | Admitting: RADIOLOGY
Payer: COMMERCIAL

## 2024-12-16 DIAGNOSIS — I72.8: ICD-10-CM

## 2024-12-16 PROCEDURE — 74174 CTA ABD&PLVS W/CONTRAST: CPT

## 2024-12-16 PROCEDURE — 250N000011 HC RX IP 250 OP 636: Performed by: RADIOLOGY

## 2024-12-16 PROCEDURE — 250N000009 HC RX 250: Performed by: RADIOLOGY

## 2024-12-16 RX ORDER — IOPAMIDOL 755 MG/ML
72 INJECTION, SOLUTION INTRAVASCULAR ONCE
Status: COMPLETED | OUTPATIENT
Start: 2024-12-16 | End: 2024-12-16

## 2024-12-16 RX ADMIN — IOPAMIDOL 72 ML: 755 INJECTION, SOLUTION INTRAVENOUS at 12:34

## 2024-12-16 RX ADMIN — SODIUM CHLORIDE 100 ML: 9 INJECTION, SOLUTION INTRAVENOUS at 12:34

## 2024-12-17 NOTE — PROGRESS NOTES
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 Presents today for routine follow up.    Interval HPI:  Since I last saw her she has had several repeat admissions related to pancreatitis. For the most part in the last year has not had any alcohol. She did have one episode of alcohol related pancreatitis in November but besides that says she hadn't drank for almost a year. She feels like she has strong support system in place for her sobriety including , family, and friends.    Has not had any bloody stools.    Does have occasional abdominal pain related to the pancreatitis. Usually about once a month it will get bad enough that she has vomiting. When she starts to feel abdominal pain she'll cut back on fatty foods but otherwise eats a normal diet. When she eats she does feel fullness on the left side.    No fevers. No swelling. No yellowing of skin or eyes.    Follows with Dr. Ortiz here for her pancreatitis.    ROS:  Negative unless otherwise stated in HPI.      Physical Examination:   VITALS:   There were no vitals taken for this visit.    CONSTITUTIONAL: healthy, alert and no distress.   PSYCHIATRIC: mentation appears normal and affect normal.   NEURO: Normal movements and speech.   EYES: No jaundice or pallor.   SKIN: No jaundice.   RESP: No audible cough or wheeze.   Abdomen: soft nontender nondistended.    Labs:    BMP RESULTS:  Lab Results   Component Value Date     01/24/2024    POTASSIUM 4.1 01/24/2024    CHLORIDE 106 01/24/2024    CO2 22 01/24/2024    ANIONGAP 11 01/24/2024     (H) 01/24/2024     (H) 01/20/2024    BUN 6.0 01/24/2024    CR 0.60 01/24/2024    GFRESTIMATED >90 01/24/2024    DUSTIN 8.4 (L) 01/24/2024        CBC RESULTS:  Lab Results   Component Value Date    WBC 8.8 02/02/2024     "RBC 3.53 (L) 02/02/2024    HGB 8.9 (L) 02/02/2024    HCT 30.0 (L) 02/02/2024    MCV 85 02/02/2024    MCH 25.2 (L) 02/02/2024    MCHC 29.7 (L) 02/02/2024    RDW 17.3 (H) 02/02/2024     02/02/2024       INR/PTT:  Lab Results   Component Value Date    INR 1.02 01/19/2024    PTT 28 01/19/2024       Diagnostic studies:   CTA 12/16/24:  IMPRESSION:  1.  Prior embolization of GDA and jejunal branch pseudoaneurysms. No  hemorrhage or pseudoaneurysm on today's exam.     2.  Sequelae of chronic pancreatitis with pancreatic ductal dilation,  unchanged. Similar appearance of multiple pancreatic pseudocysts.     3.  Unchanged splenic vein occlusion.     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  Her imaging demonstrates successful treatment of pseudoaneurysms without evidence of residual flow or any other new pseudoaneurysms. She has had no issues with bleeding since then.    Unfortunately she has had ongoing issues with acute on chronic pancreatitis issues about every month.    Plan   - Follow up clinic visit with IR MARGARET in one year with multiphasic CTA abdomen at that time.  - Recommend follow up with Dr. Ortiz to assist with management of her ongoing pancreatitis and large left pseudocyst.        {AMBULATORY ATTESTATION (Optional):254204}        {Diag Picklist:090297}     {Tests, documents, or independent historian(s) (Optional):688006}  {Independent interpretation of tests (Optional):806633::\"Independent interpretation of a test performed by another physician/other qualified health care professional (not separately reported) - ***\"}  {Discussion of management or test interpretation (Optional):530485::\"Discussion of management or test interpretation with external physician/other qualified healthcare professional/appropriate source - ***\"}  {Diagnosis or " "treatment significantly limited by social determinants (optional):189296::\"Diagnosis or treatment significantly limited by social determinants of health - ***\"}     {2021 E&M time:870891}     {Provider  Link to [facility] Help Grid :552479}     CC  Patient Care Team:  System, Provider Not In as PCP - General (Clinic)  Dodie Lofton MD as Assigned PCP  SELF, REFERRED     "

## 2024-12-18 ENCOUNTER — ONCOLOGY VISIT (OUTPATIENT)
Dept: RADIOLOGY | Facility: CLINIC | Age: 29
End: 2024-12-18
Attending: RADIOLOGY
Payer: COMMERCIAL

## 2024-12-18 ENCOUNTER — PATIENT OUTREACH (OUTPATIENT)
Dept: GASTROENTEROLOGY | Facility: CLINIC | Age: 29
End: 2024-12-18
Payer: COMMERCIAL

## 2024-12-18 VITALS
RESPIRATION RATE: 16 BRPM | TEMPERATURE: 98.8 F | SYSTOLIC BLOOD PRESSURE: 111 MMHG | WEIGHT: 133.1 LBS | DIASTOLIC BLOOD PRESSURE: 74 MMHG | OXYGEN SATURATION: 97 % | BODY MASS INDEX: 22.15 KG/M2 | HEART RATE: 87 BPM

## 2024-12-18 DIAGNOSIS — I72.8: Primary | ICD-10-CM

## 2024-12-18 PROCEDURE — 99213 OFFICE O/P EST LOW 20 MIN: CPT | Mod: GC | Performed by: RADIOLOGY

## 2024-12-18 RX ORDER — ONDANSETRON 4 MG/1
4 TABLET, ORALLY DISINTEGRATING ORAL
COMMUNITY
Start: 2024-10-25

## 2024-12-18 ASSESSMENT — PAIN SCALES - GENERAL: PAINLEVEL_OUTOF10: NO PAIN (0)

## 2024-12-18 NOTE — PROGRESS NOTES
Request for clinic visit with Dr. Ortiz from Dr. Dodge for: CT shows continued occlusion of PSA, large pseudocyst and smaller PCs elsewhere, and some panc duct dilation.     Contacted patient to offer next available clinic visit on 1/9/25 @ 12:00 PM. Unable to reach patient at this time. LVM with request to return call to clinic. [employer] message also sent.     Freda Davison RN Care Coordinator

## 2024-12-18 NOTE — PATIENT INSTRUCTIONS
Hello,     We will see you back in 1 year with another CTA and you will be seeing our Physician Assistants who work very closely with Dr. Dodge in clinic. Our  will call you with appointment details when the time gets closer.   We have also made a referral to Dr. Angel Andino for you to see. He's one of our advanced GI doctors and as I explained to you in clinic, it is important for you to see him in clinic and get an assessment. Please call if you have any questions.     Sincerely,         Kylee SPANGLER RN, BSN-covering for Shante KHAN  Interventional Radiology/Vascular  Nurse Coordinator  Phone: [phone], option 2

## 2024-12-18 NOTE — LETTER
12/18/2024      Rani Lisa  [address] [facility] [address]      Dear Colleague,    Thank you for referring your patient, Rani Lisa, to the [facility]. Please see a copy of my visit note below.        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 Presents today for routine follow up.    Interval HPI:  Since I last saw her she has had several repeat admissions related to pancreatitis. For the most part in the last year has not had any alcohol. She did have one episode of alcohol related pancreatitis in November 2024, but besides that says she hadn't drank for almost a year. She feels like she has strong support system in place for her sobriety including , family, and friends.    Has not had any bloody stools, worsening abdominal pain or fever.    She does have occasional abdominal pain related to the pancreatitis. Usually about once a month it will get bad enough that she has vomiting. When she starts to feel abdominal pain she'll cut back on fatty foods but otherwise eats a normal diet. When she eats she does feel fullness on the left side.    No fevers. No swelling. No yellowing of skin or eyes.    Follows with Dr. Ortiz here for her pancreatitis.    ROS:  Negative unless otherwise stated in HPI.      Physical Examination:   VITALS:   There were no vitals taken for this visit.    CONSTITUTIONAL: healthy, alert and no distress.   PSYCHIATRIC: mentation appears normal and affect normal.   NEURO: Normal movements and speech.   EYES: No jaundice or pallor.   SKIN: No jaundice.   RESP: No audible cough or wheeze.   Abdomen: soft nontender nondistended.    Labs:    BMP RESULTS:  Lab Results   Component Value Date     01/24/2024    POTASSIUM 4.1 01/24/2024    CHLORIDE 106  01/24/2024    CO2 22 01/24/2024    ANIONGAP 11 01/24/2024     (H) 01/24/2024     (H) 01/20/2024    BUN 6.0 01/24/2024    CR 0.60 01/24/2024    GFRESTIMATED >90 01/24/2024    DUSTIN 8.4 (L) 01/24/2024        CBC RESULTS:  Lab Results   Component Value Date    WBC 8.8 02/02/2024    RBC 3.53 (L) 02/02/2024    HGB 8.9 (L) 02/02/2024    HCT 30.0 (L) 02/02/2024    MCV 85 02/02/2024    MCH 25.2 (L) 02/02/2024    MCHC 29.7 (L) 02/02/2024    RDW 17.3 (H) 02/02/2024     02/02/2024       INR/PTT:  Lab Results   Component Value Date    INR 1.02 01/19/2024    PTT 28 01/19/2024       Diagnostic studies:     Imaging personally reviewed and interpreted by me.  CTA 12/16/24:  IMPRESSION:  1.  Prior embolization of GDA and jejunal branch pseudoaneurysms. No  hemorrhage or pseudoaneurysm on today's exam.     2.  Sequelae of chronic pancreatitis with pancreatic ductal dilation,  unchanged. Similar appearance of multiple pancreatic pseudocysts.     3.  Unchanged splenic vein occlusion.     TOY JEFFREY MD     Assessment   27 yo w/ hx of alcohol induced pancreatitis complicated by large pseudoaneurysm of the GDA and a small proximal branch of the SMA, status post embolization on 1/19/24.  Her imaging demonstrates successful treatment of pseudoaneurysms without evidence of residual flow or new pseudoaneurysm formation. She has had no issues with bleeding since then.    Unfortunately she has had ongoing issues with acute on chronic pancreatitis episodes about every month.    Plan   - Follow up clinic visit with IR MARGARET in one year with multiphasic CTA abdomen at that time.  - Recommend follow up with Dr. Ortiz to assist with management of her ongoing pancreatitis and large left pseudocyst.     I was present for the entire in person clinic visit and agree with the assessment and plan documented by the fellow.     It was a pleasure to conduct this in person clinic visit with Ms. Lisa today.  Thank you for  involving the interventional radiology service in her care.     I spent a total of 20 minutes on today's visit, over 50% time was for counseling and care coordination.  In addition I spent 5 minutes reviewing prior imaging.     Darcy Dodge MD  Interventional Radiology           Pager [phone]        Discussion of management or test interpretation with external physician/other qualified healthcare professional/appropriate source - CTA    CC  Patient Care Team:  System, Provider Not In as PCP - General (Clinic)  Dodie Lofton MD as Assigned PCP  SELF, REFERRED         Again, thank you for allowing me to participate in the care of your patient.        Sincerely,        Darcy Dodge MD    Electronically signed

## 2024-12-18 NOTE — NURSING NOTE
"Oncology Rooming Note    December 18, 2024 9:10 AM   Rani Lisa is a 29 year old female who presents for:    No chief complaint on file.    Initial Vitals: /74 (BP Location: Right arm, Patient Position: Sitting, Cuff Size: Adult Regular)   Pulse 87   Temp 98.8  F (37.1  C) (Oral)   Resp 16   Wt 60.4 kg (133 lb 1.6 oz)   SpO2 97%   BMI 22.15 kg/m   Estimated body mass index is 22.15 kg/m  as calculated from the following:    Height as of 6/3/24: 1.651 m (5' 5\").    Weight as of this encounter: 60.4 kg (133 lb 1.6 oz). Body surface area is 1.66 meters squared.  No Pain (0) Comment: Data Unavailable   No LMP recorded.  Allergies reviewed: Yes  Medications reviewed: Yes    Medications: Medication refills not needed today.  Pharmacy name entered into [employer]: [facility] - [address]    Frailty Screening:   Is the patient here for a new oncology consult visit in cancer care? 2. No      Clinical concerns: none       Kenny Pollock"

## 2024-12-19 NOTE — PROGRESS NOTES
Attempted to reach patient to offer virtual clinic visit with Dr. Ortiz today. LVM requesting return call to clinic. Will continue to hold the offered clinic visit on 1/9/25 if I do not hear back from her today.     Freda Davison RN Care Coordinator

## 2025-01-10 ENCOUNTER — PREP FOR PROCEDURE (OUTPATIENT)
Dept: GASTROENTEROLOGY | Facility: CLINIC | Age: 30
End: 2025-01-10
Payer: COMMERCIAL

## 2025-01-10 DIAGNOSIS — R18.8 ABDOMINAL FLUID COLLECTION: Primary | ICD-10-CM

## 2025-01-10 DIAGNOSIS — K86.89 PANCREATIC DUCT STRICTURE: ICD-10-CM

## 2025-01-10 NOTE — PROGRESS NOTES
Please assist in scheduling:     Procedure/Imaging/Clinic: EUS (Endoscopic Ultrasound) and ERCP (Endoscopic Retrograde Cholangiopancreatography)  Physician: Angel  Timing: Next available  Scope time: Provider average   Anesthesia: General  Dx: Abdominal fluid collection; Pancreatic duct stricture  Tier:3  Location: [facility]  OK to schedule while attending: Not specified by provider  Patient communication letter header:EUS (Endoscopic Ultrasound) and ERCP (Endoscopic Retrograde Cholangiopancreatography)

## 2025-01-13 ENCOUNTER — VIRTUAL VISIT (OUTPATIENT)
Dept: FAMILY MEDICINE | Facility: CLINIC | Age: 30
End: 2025-01-13
Payer: COMMERCIAL

## 2025-01-13 DIAGNOSIS — F90.9 ATTENTION DEFICIT HYPERACTIVITY DISORDER (ADHD), UNSPECIFIED ADHD TYPE: Primary | ICD-10-CM

## 2025-01-13 PROCEDURE — 98005 SYNCH AUDIO-VIDEO EST LOW 20: CPT | Performed by: STUDENT IN AN ORGANIZED HEALTH CARE EDUCATION/TRAINING PROGRAM

## 2025-01-13 RX ORDER — LISDEXAMFETAMINE DIMESYLATE 20 MG/1
20 CAPSULE ORAL
Qty: 30 CAPSULE | Refills: 0 | Status: SHIPPED | OUTPATIENT
Start: 2025-02-12 | End: 2025-03-14

## 2025-01-13 RX ORDER — LISDEXAMFETAMINE DIMESYLATE 10 MG/1
10 CAPSULE ORAL DAILY
Qty: 30 CAPSULE | Refills: 0 | Status: SHIPPED | OUTPATIENT
Start: 2025-03-16 | End: 2025-04-15

## 2025-01-13 RX ORDER — LISDEXAMFETAMINE DIMESYLATE 10 MG/1
10 CAPSULE ORAL DAILY
Qty: 30 CAPSULE | Refills: 0 | Status: SHIPPED | OUTPATIENT
Start: 2025-01-13 | End: 2025-02-12

## 2025-01-13 RX ORDER — LISDEXAMFETAMINE DIMESYLATE 10 MG/1
10 CAPSULE ORAL DAILY
Qty: 30 CAPSULE | Refills: 0 | Status: SHIPPED | OUTPATIENT
Start: 2025-02-13 | End: 2025-03-15

## 2025-01-13 RX ORDER — LISDEXAMFETAMINE DIMESYLATE 20 MG/1
20 CAPSULE ORAL
Qty: 30 CAPSULE | Refills: 0 | Status: SHIPPED | OUTPATIENT
Start: 2025-03-14 | End: 2025-04-13

## 2025-01-13 RX ORDER — LISDEXAMFETAMINE DIMESYLATE 20 MG/1
20 CAPSULE ORAL
Qty: 30 CAPSULE | Refills: 0 | Status: SHIPPED | OUTPATIENT
Start: 2025-01-13 | End: 2025-02-12

## 2025-01-13 ASSESSMENT — ANXIETY QUESTIONNAIRES
1. FEELING NERVOUS, ANXIOUS, OR ON EDGE: NOT AT ALL
2. NOT BEING ABLE TO STOP OR CONTROL WORRYING: NOT AT ALL
3. WORRYING TOO MUCH ABOUT DIFFERENT THINGS: NOT AT ALL
GAD7 TOTAL SCORE: 0
GAD7 TOTAL SCORE: 0
4. TROUBLE RELAXING: NOT AT ALL
7. FEELING AFRAID AS IF SOMETHING AWFUL MIGHT HAPPEN: NOT AT ALL
GAD7 TOTAL SCORE: 0
6. BECOMING EASILY ANNOYED OR IRRITABLE: NOT AT ALL
8. IF YOU CHECKED OFF ANY PROBLEMS, HOW DIFFICULT HAVE THESE MADE IT FOR YOU TO DO YOUR WORK, TAKE CARE OF THINGS AT HOME, OR GET ALONG WITH OTHER PEOPLE?: NOT DIFFICULT AT ALL
5. BEING SO RESTLESS THAT IT IS HARD TO SIT STILL: NOT AT ALL
7. FEELING AFRAID AS IF SOMETHING AWFUL MIGHT HAPPEN: NOT AT ALL
IF YOU CHECKED OFF ANY PROBLEMS ON THIS QUESTIONNAIRE, HOW DIFFICULT HAVE THESE PROBLEMS MADE IT FOR YOU TO DO YOUR WORK, TAKE CARE OF THINGS AT HOME, OR GET ALONG WITH OTHER PEOPLE: NOT DIFFICULT AT ALL

## 2025-01-13 ASSESSMENT — PATIENT HEALTH QUESTIONNAIRE - PHQ9
SUM OF ALL RESPONSES TO PHQ QUESTIONS 1-9: 2
10. IF YOU CHECKED OFF ANY PROBLEMS, HOW DIFFICULT HAVE THESE PROBLEMS MADE IT FOR YOU TO DO YOUR WORK, TAKE CARE OF THINGS AT HOME, OR GET ALONG WITH OTHER PEOPLE: NOT DIFFICULT AT ALL
SUM OF ALL RESPONSES TO PHQ QUESTIONS 1-9: 2

## 2025-01-13 NOTE — PROGRESS NOTES
Rayshawn is a 29 year old who is being evaluated via a billable video visit.    How would you like to obtain your AVS? MyChart  If the video visit is dropped, the invitation should be resent by: Text to cell phone: [phone]  Will anyone else be joining your video visit? No      Assessment & Plan     Attention deficit hyperactivity disorder (ADHD), unspecified ADHD type  Patient is a pleasant 29 year old who presents today for ADHD follow up. She has felt 20 mg qam and 10 mg at lunch has been working well. Refilled x3 months, will have her message in 3 months and will refill additional 3 months with a 6 month follow up for ADHD given stable.   - lisdexamfetamine (VYVANSE) 20 MG capsule  Dispense: 30 capsule; Refill: 0  - lisdexamfetamine (VYVANSE) 20 MG capsule  Dispense: 30 capsule; Refill: 0  - lisdexamfetamine (VYVANSE) 20 MG capsule  Dispense: 30 capsule; Refill: 0  - PRIMARY CARE FOLLOW-UP SCHEDULING  - lisdexamfetamine (VYVANSE) 10 MG capsule  Dispense: 30 capsule; Refill: 0  - lisdexamfetamine (VYVANSE) 10 MG capsule  Dispense: 30 capsule; Refill: 0  - lisdexamfetamine (VYVANSE) 10 MG capsule  Dispense: 30 capsule; Refill: 0      Nicotine/Tobacco Cessation  She reports that she has been smoking cigarettes. She has never used smokeless tobacco.  Nicotine/Tobacco Cessation Plan  Did not discuss today       Subjective   Rayshawn is a 29 year old, presenting for the following health issues:  Behavioral Problem        1/13/2025    10:47 AM   Additional Questions   Roomed by Carina MARTINES CMA   Accompanied by Self     History of Present Illness       Reason for visit:  Med check She is missing 1 dose(s) of medications per week.  She is not taking prescribed medications regularly due to other.       Medication Followup of Vyvanse  Taking Medication as prescribed: yes  Side Effects:  None  Medication Helping Symptoms:  yes    Going well on 20-10 vyvanse.     Interested in switching out nexplanon - wants to do nexplanon again.      Review of Systems  Constitutional, HEENT, cardiovascular, pulmonary, gi and gu systems are negative, except as otherwise noted.      Objective           Vitals:  No vitals were obtained today due to virtual visit.    Physical Exam   GENERAL: alert and no distress  EYES: Eyes grossly normal to inspection.  No discharge or erythema, or obvious scleral/conjunctival abnormalities.  RESP: No audible wheeze, cough, or visible cyanosis.    SKIN: Visible skin clear. No significant rash, abnormal pigmentation or lesions.  NEURO: Cranial nerves grossly intact.  Mentation and speech appropriate for age.  PSYCH: Appropriate affect, tone, and pace of words          Video-Visit Details    Type of service:  Video Visit   Originating Location (pt. Location): Home    Distant Location (provider location):  On-site  Platform used for Video Visit: Naina  Signed Electronically by: Dodie Lofton MD

## 2025-01-13 NOTE — TELEPHONE ENCOUNTER
FUTURE VISIT INFORMATION      SURGERY INFORMATION:  Date: 2/3/25  [facility]  Surgeon:  Guru Elena Ortiz MD   Anesthesia Type:  general  Procedure: ENDOSCOPIC ULTRASOUND, ESOPHAGOSCOPY / UPPER GASTROINTESTINAL TRACT (GI) ENDOSCOPIC RETROGRADE CHOLANGIOPANCREATOGRAPHY   Consult: Virtual visit 25    RECORDS REQUESTED FROM:       Primary Care Provider: Fern    Most recent EKG+ Tracin24

## 2025-01-17 ENCOUNTER — ANESTHESIA EVENT (OUTPATIENT)
Dept: SURGERY | Facility: CLINIC | Age: 30
End: 2025-01-17
Payer: COMMERCIAL

## 2025-01-17 ENCOUNTER — PRE VISIT (OUTPATIENT)
Dept: SURGERY | Facility: CLINIC | Age: 30
End: 2025-01-17

## 2025-01-31 ENCOUNTER — HOSPITAL ENCOUNTER (OUTPATIENT)
Dept: CT IMAGING | Facility: CLINIC | Age: 30
Discharge: HOME | End: 2025-01-31
Admitting: INTERNAL MEDICINE
Payer: COMMERCIAL

## 2025-01-31 DIAGNOSIS — K86.89 FLUID COLLECTION OF PANCREAS: ICD-10-CM

## 2025-01-31 PROCEDURE — 250N000009 HC RX 250: Performed by: RADIOLOGY

## 2025-01-31 PROCEDURE — 250N000011 HC RX IP 250 OP 636: Performed by: RADIOLOGY

## 2025-01-31 PROCEDURE — 74177 CT ABD & PELVIS W/CONTRAST: CPT

## 2025-01-31 RX ORDER — IOPAMIDOL 755 MG/ML
66 INJECTION, SOLUTION INTRAVASCULAR ONCE
Status: COMPLETED | OUTPATIENT
Start: 2025-01-31 | End: 2025-01-31

## 2025-01-31 RX ADMIN — IOPAMIDOL 66 ML: 755 INJECTION, SOLUTION INTRAVENOUS at 08:02

## 2025-01-31 RX ADMIN — SODIUM CHLORIDE 56 ML: 9 INJECTION, SOLUTION INTRAVENOUS at 08:02

## 2025-02-03 ENCOUNTER — APPOINTMENT (OUTPATIENT)
Dept: GENERAL RADIOLOGY | Facility: CLINIC | Age: 30
End: 2025-02-03
Attending: INTERNAL MEDICINE
Payer: COMMERCIAL

## 2025-02-03 ENCOUNTER — ANESTHESIA (OUTPATIENT)
Dept: SURGERY | Facility: CLINIC | Age: 30
End: 2025-02-03
Payer: COMMERCIAL

## 2025-02-03 ENCOUNTER — HOSPITAL ENCOUNTER (OUTPATIENT)
Facility: CLINIC | Age: 30
Discharge: HOME | End: 2025-02-03
Attending: INTERNAL MEDICINE | Admitting: INTERNAL MEDICINE
Payer: COMMERCIAL

## 2025-02-03 VITALS
HEIGHT: 63 IN | BODY MASS INDEX: 24.06 KG/M2 | HEART RATE: 68 BPM | RESPIRATION RATE: 16 BRPM | SYSTOLIC BLOOD PRESSURE: 100 MMHG | DIASTOLIC BLOOD PRESSURE: 70 MMHG | OXYGEN SATURATION: 100 % | TEMPERATURE: 97.5 F | WEIGHT: 135.8 LBS

## 2025-02-03 DIAGNOSIS — Z98.890 S/P FINE NEEDLE ASPIRATION: Primary | ICD-10-CM

## 2025-02-03 LAB
ALBUMIN SERPL BCG-MCNC: 4.1 G/DL (ref 3.5–5.2)
ALP SERPL-CCNC: 74 U/L (ref 40–150)
ALT SERPL W P-5'-P-CCNC: 16 U/L (ref 0–50)
AMYLASE BODY FLUID SOURCE: NORMAL
AMYLASE FLD-CCNC: >7500 U/L
AMYLASE SERPL-CCNC: 109 U/L (ref 28–100)
ANION GAP SERPL CALCULATED.3IONS-SCNC: 11 MMOL/L (ref 7–15)
AST SERPL W P-5'-P-CCNC: 23 U/L (ref 0–45)
BACTERIA SPEC CULT: NORMAL
BASOPHILS # BLD AUTO: 0 10E3/UL (ref 0–0.2)
BASOPHILS NFR BLD AUTO: 0 %
BILIRUB SERPL-MCNC: 0.3 MG/DL
BUN SERPL-MCNC: 12.5 MG/DL (ref 6–20)
CALCIUM SERPL-MCNC: 9.2 MG/DL (ref 8.8–10.4)
CHLORIDE SERPL-SCNC: 105 MMOL/L (ref 98–107)
CREAT FLD-MCNC: 0.8 MG/DL
CREAT SERPL-MCNC: 0.69 MG/DL (ref 0.51–0.95)
CREATININE BODY FLUID SOURCE: NORMAL
EGFRCR SERPLBLD CKD-EPI 2021: >90 ML/MIN/1.73M2
EOSINOPHIL # BLD AUTO: 0.1 10E3/UL (ref 0–0.7)
EOSINOPHIL NFR BLD AUTO: 1 %
ERCP: NORMAL
ERYTHROCYTE [DISTWIDTH] IN BLOOD BY AUTOMATED COUNT: 14.6 % (ref 10–15)
GLUCOSE SERPL-MCNC: 117 MG/DL (ref 70–99)
GRAM STAIN RESULT: NORMAL
GRAM STAIN RESULT: NORMAL
HCO3 SERPL-SCNC: 22 MMOL/L (ref 22–29)
HCT VFR BLD AUTO: 37 % (ref 35–47)
HGB BLD-MCNC: 12.1 G/DL (ref 11.7–15.7)
IMM GRANULOCYTES # BLD: 0 10E3/UL
IMM GRANULOCYTES NFR BLD: 0 %
LIPASE SERPL-CCNC: 50 U/L (ref 13–60)
LIPASE SERPL-CCNC: 73 U/L (ref 13–60)
LYMPHOCYTES # BLD AUTO: 1.1 10E3/UL (ref 0.8–5.3)
LYMPHOCYTES NFR BLD AUTO: 13 %
MCH RBC QN AUTO: 28.7 PG (ref 26.5–33)
MCHC RBC AUTO-ENTMCNC: 32.7 G/DL (ref 31.5–36.5)
MCV RBC AUTO: 88 FL (ref 78–100)
MONOCYTES # BLD AUTO: 0.4 10E3/UL (ref 0–1.3)
MONOCYTES NFR BLD AUTO: 4 %
NEUTROPHILS # BLD AUTO: 6.8 10E3/UL (ref 1.6–8.3)
NEUTROPHILS NFR BLD AUTO: 82 %
NRBC # BLD AUTO: 0 10E3/UL
NRBC BLD AUTO-RTO: 0 /100
PLATELET # BLD AUTO: 162 10E3/UL (ref 150–450)
POTASSIUM SERPL-SCNC: 4.3 MMOL/L (ref 3.4–5.3)
PROT SERPL-MCNC: 6.8 G/DL (ref 6.4–8.3)
RBC # BLD AUTO: 4.21 10E6/UL (ref 3.8–5.2)
SODIUM SERPL-SCNC: 138 MMOL/L (ref 135–145)
UPPER EUS: NORMAL
WBC # BLD AUTO: 8.3 10E3/UL (ref 4–11)

## 2025-02-03 PROCEDURE — 250N000009 HC RX 250: Performed by: NURSE ANESTHETIST, CERTIFIED REGISTERED

## 2025-02-03 PROCEDURE — 36415 COLL VENOUS BLD VENIPUNCTURE: CPT | Performed by: INTERNAL MEDICINE

## 2025-02-03 PROCEDURE — 88313 SPECIAL STAINS GROUP 2: CPT | Mod: 26 | Performed by: PATHOLOGY

## 2025-02-03 PROCEDURE — 82150 ASSAY OF AMYLASE: CPT | Performed by: INTERNAL MEDICINE

## 2025-02-03 PROCEDURE — C1769 GUIDE WIRE: HCPCS | Performed by: INTERNAL MEDICINE

## 2025-02-03 PROCEDURE — 255N000002 HC RX 255 OP 636: Performed by: INTERNAL MEDICINE

## 2025-02-03 PROCEDURE — 82040 ASSAY OF SERUM ALBUMIN: CPT | Performed by: INTERNAL MEDICINE

## 2025-02-03 PROCEDURE — 85004 AUTOMATED DIFF WBC COUNT: CPT | Performed by: INTERNAL MEDICINE

## 2025-02-03 PROCEDURE — 258N000003 HC RX IP 258 OP 636: Performed by: NURSE ANESTHETIST, CERTIFIED REGISTERED

## 2025-02-03 PROCEDURE — 710N000010 HC RECOVERY PHASE 1, LEVEL 2, PER MIN: Performed by: INTERNAL MEDICINE

## 2025-02-03 PROCEDURE — C2617 STENT, NON-COR, TEM W/O DEL: HCPCS | Performed by: INTERNAL MEDICINE

## 2025-02-03 PROCEDURE — 370N000017 HC ANESTHESIA TECHNICAL FEE, PER MIN: Performed by: INTERNAL MEDICINE

## 2025-02-03 PROCEDURE — 87101 SKIN FUNGI CULTURE: CPT | Performed by: INTERNAL MEDICINE

## 2025-02-03 PROCEDURE — 87070 CULTURE OTHR SPECIMN AEROBIC: CPT | Performed by: INTERNAL MEDICINE

## 2025-02-03 PROCEDURE — 85041 AUTOMATED RBC COUNT: CPT | Performed by: INTERNAL MEDICINE

## 2025-02-03 PROCEDURE — 999N000141 HC STATISTIC PRE-PROCEDURE NURSING ASSESSMENT: Performed by: INTERNAL MEDICINE

## 2025-02-03 PROCEDURE — 82570 ASSAY OF URINE CREATININE: CPT | Performed by: INTERNAL MEDICINE

## 2025-02-03 PROCEDURE — 83690 ASSAY OF LIPASE: CPT | Performed by: INTERNAL MEDICINE

## 2025-02-03 PROCEDURE — C1726 CATH, BAL DIL, NON-VASCULAR: HCPCS | Performed by: INTERNAL MEDICINE

## 2025-02-03 PROCEDURE — 250N000009 HC RX 250: Performed by: INTERNAL MEDICINE

## 2025-02-03 PROCEDURE — 250N000011 HC RX IP 250 OP 636: Performed by: INTERNAL MEDICINE

## 2025-02-03 PROCEDURE — 710N000012 HC RECOVERY PHASE 2, PER MINUTE: Performed by: INTERNAL MEDICINE

## 2025-02-03 PROCEDURE — 87205 SMEAR GRAM STAIN: CPT | Performed by: INTERNAL MEDICINE

## 2025-02-03 PROCEDURE — 250N000011 HC RX IP 250 OP 636: Performed by: NURSE ANESTHETIST, CERTIFIED REGISTERED

## 2025-02-03 PROCEDURE — 88108 CYTOPATH CONCENTRATE TECH: CPT | Mod: 26 | Performed by: PATHOLOGY

## 2025-02-03 PROCEDURE — 250N000011 HC RX IP 250 OP 636: Performed by: ANESTHESIOLOGY

## 2025-02-03 PROCEDURE — 250N000025 HC SEVOFLURANE, PER MIN: Performed by: INTERNAL MEDICINE

## 2025-02-03 PROCEDURE — C1874 STENT, COATED/COV W/DEL SYS: HCPCS | Performed by: INTERNAL MEDICINE

## 2025-02-03 PROCEDURE — 999N000181 XR SURGERY CARM FLUORO GREATER THAN 5 MIN W STILLS

## 2025-02-03 PROCEDURE — 88313 SPECIAL STAINS GROUP 2: CPT | Mod: TC | Performed by: INTERNAL MEDICINE

## 2025-02-03 PROCEDURE — 360N000083 HC SURGERY LEVEL 3 W/ FLUORO, PER MIN: Performed by: INTERNAL MEDICINE

## 2025-02-03 PROCEDURE — 88108 CYTOPATH CONCENTRATE TECH: CPT | Mod: TC | Performed by: INTERNAL MEDICINE

## 2025-02-03 PROCEDURE — 250N000013 HC RX MED GY IP 250 OP 250 PS 637: Performed by: ANESTHESIOLOGY

## 2025-02-03 PROCEDURE — 87075 CULTR BACTERIA EXCEPT BLOOD: CPT | Performed by: INTERNAL MEDICINE

## 2025-02-03 PROCEDURE — 272N000001 HC OR GENERAL SUPPLY STERILE: Performed by: INTERNAL MEDICINE

## 2025-02-03 PROCEDURE — 84155 ASSAY OF PROTEIN SERUM: CPT | Performed by: INTERNAL MEDICINE

## 2025-02-03 DEVICE — IMPLANTABLE DEVICE: Type: IMPLANTABLE DEVICE | Site: BILE DUCT | Status: FUNCTIONAL

## 2025-02-03 DEVICE — STENT ZIMMON PANCREA 5FRX10CM SGL PIGTAIL G22362: Type: IMPLANTABLE DEVICE | Site: PANCREATIC DUCT | Status: FUNCTIONAL

## 2025-02-03 RX ORDER — ONDANSETRON 2 MG/ML
4 INJECTION INTRAMUSCULAR; INTRAVENOUS EVERY 6 HOURS PRN
Status: DISCONTINUED | OUTPATIENT
Start: 2025-02-03 | End: 2025-02-03 | Stop reason: HOSPADM

## 2025-02-03 RX ORDER — DEXAMETHASONE SODIUM PHOSPHATE 4 MG/ML
INJECTION, SOLUTION INTRA-ARTICULAR; INTRALESIONAL; INTRAMUSCULAR; INTRAVENOUS; SOFT TISSUE PRN
Status: DISCONTINUED | OUTPATIENT
Start: 2025-02-03 | End: 2025-02-03

## 2025-02-03 RX ORDER — NALOXONE HYDROCHLORIDE 0.4 MG/ML
0.1 INJECTION, SOLUTION INTRAMUSCULAR; INTRAVENOUS; SUBCUTANEOUS
Status: DISCONTINUED | OUTPATIENT
Start: 2025-02-03 | End: 2025-02-03 | Stop reason: HOSPADM

## 2025-02-03 RX ORDER — SODIUM CHLORIDE, SODIUM LACTATE, POTASSIUM CHLORIDE, CALCIUM CHLORIDE 600; 310; 30; 20 MG/100ML; MG/100ML; MG/100ML; MG/100ML
INJECTION, SOLUTION INTRAVENOUS CONTINUOUS PRN
Status: DISCONTINUED | OUTPATIENT
Start: 2025-02-03 | End: 2025-02-03

## 2025-02-03 RX ORDER — ONDANSETRON 4 MG/1
4 TABLET, ORALLY DISINTEGRATING ORAL EVERY 30 MIN PRN
Status: DISCONTINUED | OUTPATIENT
Start: 2025-02-03 | End: 2025-02-03 | Stop reason: HOSPADM

## 2025-02-03 RX ORDER — DEXAMETHASONE SODIUM PHOSPHATE 4 MG/ML
4 INJECTION, SOLUTION INTRA-ARTICULAR; INTRALESIONAL; INTRAMUSCULAR; INTRAVENOUS; SOFT TISSUE
Status: DISCONTINUED | OUTPATIENT
Start: 2025-02-03 | End: 2025-02-03 | Stop reason: HOSPADM

## 2025-02-03 RX ORDER — INDOMETHACIN 50 MG/1
SUPPOSITORY RECTAL PRN
Status: DISCONTINUED | OUTPATIENT
Start: 2025-02-03 | End: 2025-02-03 | Stop reason: HOSPADM

## 2025-02-03 RX ORDER — FENTANYL CITRATE 50 UG/ML
INJECTION, SOLUTION INTRAMUSCULAR; INTRAVENOUS PRN
Status: DISCONTINUED | OUTPATIENT
Start: 2025-02-03 | End: 2025-02-03

## 2025-02-03 RX ORDER — PROCHLORPERAZINE MALEATE 5 MG/1
10 TABLET ORAL EVERY 6 HOURS PRN
Status: DISCONTINUED | OUTPATIENT
Start: 2025-02-03 | End: 2025-02-03 | Stop reason: HOSPADM

## 2025-02-03 RX ORDER — PROPOFOL 10 MG/ML
INJECTION, EMULSION INTRAVENOUS PRN
Status: DISCONTINUED | OUTPATIENT
Start: 2025-02-03 | End: 2025-02-03

## 2025-02-03 RX ORDER — OXYCODONE HYDROCHLORIDE 5 MG/1
5 TABLET ORAL
Status: DISCONTINUED | OUTPATIENT
Start: 2025-02-03 | End: 2025-02-03 | Stop reason: HOSPADM

## 2025-02-03 RX ORDER — FENTANYL CITRATE 50 UG/ML
25 INJECTION, SOLUTION INTRAMUSCULAR; INTRAVENOUS EVERY 5 MIN PRN
Status: DISCONTINUED | OUTPATIENT
Start: 2025-02-03 | End: 2025-02-03 | Stop reason: HOSPADM

## 2025-02-03 RX ORDER — FENTANYL CITRATE 50 UG/ML
50 INJECTION, SOLUTION INTRAMUSCULAR; INTRAVENOUS EVERY 5 MIN PRN
Status: DISCONTINUED | OUTPATIENT
Start: 2025-02-03 | End: 2025-02-03 | Stop reason: HOSPADM

## 2025-02-03 RX ORDER — SODIUM CHLORIDE, SODIUM LACTATE, POTASSIUM CHLORIDE, CALCIUM CHLORIDE 600; 310; 30; 20 MG/100ML; MG/100ML; MG/100ML; MG/100ML
INJECTION, SOLUTION INTRAVENOUS CONTINUOUS
Status: DISCONTINUED | OUTPATIENT
Start: 2025-02-03 | End: 2025-02-03 | Stop reason: HOSPADM

## 2025-02-03 RX ORDER — ONDANSETRON 4 MG/1
4 TABLET, ORALLY DISINTEGRATING ORAL EVERY 6 HOURS PRN
Status: DISCONTINUED | OUTPATIENT
Start: 2025-02-03 | End: 2025-02-03 | Stop reason: HOSPADM

## 2025-02-03 RX ORDER — EPINEPHRINE 1 MG/ML
INJECTION, SOLUTION, CONCENTRATE INTRAVENOUS PRN
Status: DISCONTINUED | OUTPATIENT
Start: 2025-02-03 | End: 2025-02-03 | Stop reason: HOSPADM

## 2025-02-03 RX ORDER — HYDROMORPHONE HCL IN WATER/PF 6 MG/30 ML
0.2 PATIENT CONTROLLED ANALGESIA SYRINGE INTRAVENOUS EVERY 5 MIN PRN
Status: DISCONTINUED | OUTPATIENT
Start: 2025-02-03 | End: 2025-02-03 | Stop reason: HOSPADM

## 2025-02-03 RX ORDER — FLUMAZENIL 0.1 MG/ML
0.2 INJECTION, SOLUTION INTRAVENOUS
Status: DISCONTINUED | OUTPATIENT
Start: 2025-02-03 | End: 2025-02-03 | Stop reason: HOSPADM

## 2025-02-03 RX ORDER — ONDANSETRON 2 MG/ML
INJECTION INTRAMUSCULAR; INTRAVENOUS PRN
Status: DISCONTINUED | OUTPATIENT
Start: 2025-02-03 | End: 2025-02-03

## 2025-02-03 RX ORDER — FENTANYL CITRATE 50 UG/ML
25 INJECTION, SOLUTION INTRAMUSCULAR; INTRAVENOUS
Status: DISCONTINUED | OUTPATIENT
Start: 2025-02-03 | End: 2025-02-03 | Stop reason: HOSPADM

## 2025-02-03 RX ORDER — LIDOCAINE HYDROCHLORIDE 20 MG/ML
INJECTION, SOLUTION INFILTRATION; PERINEURAL PRN
Status: DISCONTINUED | OUTPATIENT
Start: 2025-02-03 | End: 2025-02-03

## 2025-02-03 RX ORDER — LIDOCAINE 40 MG/G
CREAM TOPICAL
Status: DISCONTINUED | OUTPATIENT
Start: 2025-02-03 | End: 2025-02-03 | Stop reason: HOSPADM

## 2025-02-03 RX ORDER — OXYCODONE HYDROCHLORIDE 10 MG/1
10 TABLET ORAL
Status: COMPLETED | OUTPATIENT
Start: 2025-02-03 | End: 2025-02-03

## 2025-02-03 RX ORDER — LEVOFLOXACIN 5 MG/ML
INJECTION, SOLUTION INTRAVENOUS PRN
Status: DISCONTINUED | OUTPATIENT
Start: 2025-02-03 | End: 2025-02-03

## 2025-02-03 RX ORDER — HYDROMORPHONE HCL IN WATER/PF 6 MG/30 ML
0.4 PATIENT CONTROLLED ANALGESIA SYRINGE INTRAVENOUS EVERY 5 MIN PRN
Status: DISCONTINUED | OUTPATIENT
Start: 2025-02-03 | End: 2025-02-03 | Stop reason: HOSPADM

## 2025-02-03 RX ORDER — ONDANSETRON 2 MG/ML
4 INJECTION INTRAMUSCULAR; INTRAVENOUS EVERY 30 MIN PRN
Status: DISCONTINUED | OUTPATIENT
Start: 2025-02-03 | End: 2025-02-03 | Stop reason: HOSPADM

## 2025-02-03 RX ORDER — LEVOFLOXACIN 500 MG/1
500 TABLET, FILM COATED ORAL DAILY
Qty: 7 TABLET | Refills: 0 | Status: SHIPPED | OUTPATIENT
Start: 2025-02-04

## 2025-02-03 RX ORDER — GLYCOPYRROLATE 0.2 MG/ML
INJECTION, SOLUTION INTRAMUSCULAR; INTRAVENOUS PRN
Status: DISCONTINUED | OUTPATIENT
Start: 2025-02-03 | End: 2025-02-03

## 2025-02-03 RX ORDER — IOPAMIDOL 510 MG/ML
INJECTION, SOLUTION INTRAVASCULAR PRN
Status: DISCONTINUED | OUTPATIENT
Start: 2025-02-03 | End: 2025-02-03 | Stop reason: HOSPADM

## 2025-02-03 RX ADMIN — Medication 50 MG: at 09:27

## 2025-02-03 RX ADMIN — DEXAMETHASONE SODIUM PHOSPHATE 4 MG: 4 INJECTION, SOLUTION INTRA-ARTICULAR; INTRALESIONAL; INTRAMUSCULAR; INTRAVENOUS; SOFT TISSUE at 09:36

## 2025-02-03 RX ADMIN — FENTANYL CITRATE 25 MCG: 50 INJECTION, SOLUTION INTRAMUSCULAR; INTRAVENOUS at 16:49

## 2025-02-03 RX ADMIN — LEVOFLOXACIN 500 MG: 5 INJECTION, SOLUTION INTRAVENOUS at 09:24

## 2025-02-03 RX ADMIN — ONDANSETRON 4 MG: 2 INJECTION INTRAMUSCULAR; INTRAVENOUS at 10:26

## 2025-02-03 RX ADMIN — SODIUM CHLORIDE, POTASSIUM CHLORIDE, SODIUM LACTATE AND CALCIUM CHLORIDE: 600; 310; 30; 20 INJECTION, SOLUTION INTRAVENOUS at 09:21

## 2025-02-03 RX ADMIN — Medication 100 MG: at 11:13

## 2025-02-03 RX ADMIN — HYDROMORPHONE HYDROCHLORIDE 0.5 MG: 1 INJECTION, SOLUTION INTRAMUSCULAR; INTRAVENOUS; SUBCUTANEOUS at 11:03

## 2025-02-03 RX ADMIN — GLYCOPYRROLATE 0.2 MG: 0.2 INJECTION, SOLUTION INTRAMUSCULAR; INTRAVENOUS at 10:15

## 2025-02-03 RX ADMIN — DEXMEDETOMIDINE HYDROCHLORIDE 12 MCG: 100 INJECTION, SOLUTION INTRAVENOUS at 10:58

## 2025-02-03 RX ADMIN — OXYCODONE HYDROCHLORIDE 10 MG: 10 TABLET ORAL at 13:09

## 2025-02-03 RX ADMIN — LIDOCAINE HYDROCHLORIDE 100 MG: 20 INJECTION, SOLUTION INFILTRATION; PERINEURAL at 09:26

## 2025-02-03 RX ADMIN — PHENYLEPHRINE HYDROCHLORIDE 100 MCG: 10 INJECTION INTRAVENOUS at 09:55

## 2025-02-03 RX ADMIN — PROPOFOL 50 MG: 10 INJECTION, EMULSION INTRAVENOUS at 09:29

## 2025-02-03 RX ADMIN — ONDANSETRON 4 MG: 2 INJECTION INTRAMUSCULAR; INTRAVENOUS at 11:39

## 2025-02-03 RX ADMIN — FENTANYL CITRATE 50 MCG: 50 INJECTION INTRAMUSCULAR; INTRAVENOUS at 09:50

## 2025-02-03 RX ADMIN — FENTANYL CITRATE 25 MCG: 50 INJECTION, SOLUTION INTRAMUSCULAR; INTRAVENOUS at 14:37

## 2025-02-03 RX ADMIN — FENTANYL CITRATE 50 MCG: 50 INJECTION INTRAMUSCULAR; INTRAVENOUS at 10:20

## 2025-02-03 RX ADMIN — MIDAZOLAM 2 MG: 1 INJECTION INTRAMUSCULAR; INTRAVENOUS at 09:18

## 2025-02-03 RX ADMIN — PHENYLEPHRINE HYDROCHLORIDE 100 MCG: 10 INJECTION INTRAVENOUS at 10:44

## 2025-02-03 RX ADMIN — PHENYLEPHRINE HYDROCHLORIDE 150 MCG: 10 INJECTION INTRAVENOUS at 10:09

## 2025-02-03 RX ADMIN — PHENYLEPHRINE HYDROCHLORIDE 150 MCG: 10 INJECTION INTRAVENOUS at 10:13

## 2025-02-03 RX ADMIN — PHENYLEPHRINE HYDROCHLORIDE 150 MCG: 10 INJECTION INTRAVENOUS at 10:27

## 2025-02-03 RX ADMIN — FENTANYL CITRATE 100 MCG: 50 INJECTION INTRAMUSCULAR; INTRAVENOUS at 09:41

## 2025-02-03 RX ADMIN — FENTANYL CITRATE 25 MCG: 50 INJECTION, SOLUTION INTRAMUSCULAR; INTRAVENOUS at 15:14

## 2025-02-03 RX ADMIN — PROPOFOL 150 MG: 10 INJECTION, EMULSION INTRAVENOUS at 09:26

## 2025-02-03 RX ADMIN — DEXMEDETOMIDINE HYDROCHLORIDE 8 MCG: 100 INJECTION, SOLUTION INTRAVENOUS at 11:08

## 2025-02-03 ASSESSMENT — ACTIVITIES OF DAILY LIVING (ADL)
ADLS_ACUITY_SCORE: 50

## 2025-02-03 ASSESSMENT — LIFESTYLE VARIABLES: TOBACCO_USE: 1

## 2025-02-03 NOTE — ANESTHESIA PROCEDURE NOTES
Airway       Patient location during procedure: OR       Procedure Start/Stop Times: 2/3/2025 9:30 AM  Staff -        Anesthesiologist:  Sandeep Hinds MD       CRNA: Ernesto Easley APRN CRNA       Performed By: CRNA  Consent for Airway        Urgency: elective  Indications and Patient Condition       Indications for airway management: romy-procedural       Induction type:intravenous       Mask difficulty assessment: 1 - vent by mask    Final Airway Details       Final airway type: endotracheal airway       Successful airway: ETT - single  Endotracheal Airway Details        ETT size (mm): 7.0       Cuffed: yes       Successful intubation technique: direct laryngoscopy       DL Blade Type: MAC 3       Grade View of Cords: 1       Adjucts: stylet       Position: Right       Measured from: lips       Secured at (cm): 21       Bite block used: None (endo block)    Post intubation assessment        Placement verified by: capnometry, equal breath sounds and chest rise        Number of attempts at approach: 1       Number of other approaches attempted: 0       Secured with: tape       Ease of procedure: easy       Dentition: Intact and Unchanged    Medication(s) Administered   Medication Administration Time: 2/3/2025 9:30 AM

## 2025-02-03 NOTE — ANESTHESIA CARE TRANSFER NOTE
Patient: Rani Lisa    Procedure: Procedure(s):  ESOPHAGOGASTRODUODENOSCOPY, WITH FINE NEEDLE ASPIRATION BIOPSY, WITH ENDOSCOPIC ULTRASOUND GUIDANCE  ENDOSCOPIC RETROGRADE CHOLANGIOPANCREATOGRAPHY WITH BILIARY SPHINCTEROTOMY AND STENT PLACEMENT, PANCREATIC DUCT SPHINCTEROTOMY, STONE REMOVAL, STENT PLACEMENT       Diagnosis: Abdominal fluid collection [R18.8]  Pancreatic duct stricture [K86.89]  Diagnosis Additional Information: No value filed.    Anesthesia Type:   General     Note:    Oropharynx: oropharynx clear of all foreign objects  Level of Consciousness: awake  Oxygen Supplementation: face mask    Independent Airway: airway patency satisfactory and stable  Dentition: dentition unchanged  Vital Signs Stable: post-procedure vital signs reviewed and stable  Report to RN Given: handoff report given  Patient transferred to: PACU    Handoff Report: Identifed the Patient, Identified the Reponsible Provider, Reviewed the pertinent medical history, Discussed the surgical course, Reviewed Intra-OP anesthesia mangement and issues during anesthesia, Set expectations for post-procedure period and Allowed opportunity for questions and acknowledgement of understanding  Vitals:  Vitals Value Taken Time   /85 02/03/25 1130   Temp     Pulse 100 02/03/25 1132   Resp 16 02/03/25 1132   SpO2 98 % 02/03/25 1132   Vitals shown include unfiled device data.    Electronically Signed By: SIMON Isidro CRNA  February 3, 2025  11:32 AM

## 2025-02-03 NOTE — DISCHARGE INSTRUCTIONS
Contacting your Doctor -   To contact a doctor, call Dr. Guru Ortiz @ [phone] (GI Clinic) or [phone]  or:  [phone] and ask for the resident on call for Gastroenterology  (answered 24 hours a day)   Emergency Department:  [facility]: [phone]  [facility]: [phone] 911 if you are in need of immediate or emergent help      - Will observe clinical course and repeat CECT in 2-3 weeks. If tail pseudocyst reappears given the proximity to the renal capsule, will discuss with IR regarding placement of retroperitoneal drain   - Will recommend a course of levaquin 500 mg PO daily for 7 days   - Will repeat ERCP in 4 weeks for biliary and pancreatic stent removal and possible digital pancreatoscopy directed EHL   - If patient develops fever, chills, or worsening abdominal pain or pancreatitis before scheduled ERCP, will recommend patient to call us immediately for consideration of an earlier urgent ERCP   - Will recommend strict avoidance of Aspirin and anti-coagulation for 3 days to prevent post-sphincterotomy bleeding

## 2025-02-03 NOTE — ANESTHESIA POSTPROCEDURE EVALUATION
Patient: Rani Lisa    Procedure: Procedure(s):  ESOPHAGOGASTRODUODENOSCOPY, WITH FINE NEEDLE ASPIRATION BIOPSY, WITH ENDOSCOPIC ULTRASOUND GUIDANCE  ENDOSCOPIC RETROGRADE CHOLANGIOPANCREATOGRAPHY WITH BILIARY SPHINCTEROTOMY AND STENT PLACEMENT, PANCREATIC DUCT SPHINCTEROTOMY, STONE REMOVAL, STENT PLACEMENT       Anesthesia Type:  General    Note:  Disposition: Outpatient   Postop Pain Control: Uneventful            Sign Out: Well controlled pain   PONV: No   Neuro/Psych: Uneventful            Sign Out: Acceptable/Baseline neuro status   Airway/Respiratory: Uneventful            Sign Out: Acceptable/Baseline resp. status   CV/Hemodynamics: Uneventful            Sign Out: Acceptable CV status; No obvious hypovolemia; No obvious fluid overload   Other NRE: NONE   DID A NON-ROUTINE EVENT OCCUR? No       Last vitals:  Vitals Value Taken Time   /89 02/03/25 1200   Temp 36.5  C (97.7  F) 02/03/25 1126   Pulse 80 02/03/25 1208   Resp 12 02/03/25 1208   SpO2 100 % 02/03/25 1208   Vitals shown include unfiled device data.    Electronically Signed By: Sandeep Hinds MD  February 3, 2025  12:09 PM

## 2025-02-03 NOTE — ANESTHESIA PREPROCEDURE EVALUATION
Anesthesia Pre-Procedure Evaluation    Patient: Rani Lisa   MRN: [id number] : 1995        Procedure : Procedure(s):  ENDOSCOPIC ULTRASOUND, ESOPHAGOSCOPY / UPPER GASTROINTESTINAL TRACT (GI)  ENDOSCOPIC RETROGRADE CHOLANGIOPANCREATOGRAPHY          Past Medical History:   Diagnosis Date     Abdominal fluid collection      ADHD (attention deficit hyperactivity disorder)      Alcohol dependence (H)      Alcoholic pancreatitis      Depression      Hyperglycemia      Hypomagnesemia      Hyponatremia      Leukocytosis      Migraine      Nexplanon insertion      Pancreatic duct stricture       Past Surgical History:   Procedure Laterality Date     COLPOSCOPY       IR VISCERAL ANGIOGRAM  2024     TONSILLECTOMY & ADENOIDECTOMY        Allergies   Allergen Reactions     Bactrim [Sulfamethoxazole-Trimethoprim] Rash      Social History     Tobacco Use     Smoking status: Every Day     Current packs/day: 0.50     Types: Cigarettes     Smokeless tobacco: Never   Substance Use Topics     Alcohol use: Not Currently      Wt Readings from Last 1 Encounters:   25 61.6 kg (135 lb 12.9 oz)        Anesthesia Evaluation   Pt has had prior anesthetic. Type: General.        ROS/MED HX  ENT/Pulmonary:     (+)                tobacco use, Current use,                       Neurologic:       Cardiovascular:       METS/Exercise Tolerance:     Hematologic:       Musculoskeletal:       GI/Hepatic:       Renal/Genitourinary:     (+) renal disease,             Endo:       Psychiatric/Substance Use:       Infectious Disease:       Malignancy:       Other:          Physical Exam    Airway        Mallampati: II    Neck ROM: full     Respiratory Devices and Support         Dental       (+) Modest Abnormalities - crowns, retainers, 1 or 2 missing teeth      Cardiovascular   cardiovascular exam normal          Pulmonary   pulmonary exam normal            OUTSIDE LABS:  CBC:   Lab Results   Component Value Date    WBC 8.3  "02/03/2025    WBC 8.8 02/02/2024    HGB 12.1 02/03/2025    HGB 8.9 (L) 02/02/2024    HCT 37.0 02/03/2025    HCT 30.0 (L) 02/02/2024     02/03/2025     02/02/2024     BMP:   Lab Results   Component Value Date     02/03/2025     01/24/2024    POTASSIUM 4.3 02/03/2025    POTASSIUM 4.1 01/24/2024    CHLORIDE 105 02/03/2025    CHLORIDE 106 01/24/2024    CO2 22 02/03/2025    CO2 22 01/24/2024    BUN 12.5 02/03/2025    BUN 6.0 01/24/2024    CR 0.69 02/03/2025    CR 0.60 01/24/2024     (H) 02/03/2025     (H) 01/24/2024     COAGS:   Lab Results   Component Value Date    PTT 28 01/19/2024    INR 1.02 01/19/2024     POC: No results found for: \"BGM\", \"HCG\", \"HCGS\"  HEPATIC:   Lab Results   Component Value Date    ALBUMIN 4.1 02/03/2025    PROTTOTAL 6.8 02/03/2025    ALT 16 02/03/2025    AST 23 02/03/2025    ALKPHOS 74 02/03/2025    BILITOTAL 0.3 02/03/2025     OTHER:   Lab Results   Component Value Date    LACT 0.7 01/19/2024    DUSTIN 9.2 02/03/2025    PHOS 4.7 (H) 01/19/2024    MAG 1.9 01/24/2024    LIPASE 50 02/03/2025    TSH 17.50 (H) 01/22/2024    T4 1.19 01/22/2024       Anesthesia Plan    ASA Status:  3    NPO Status:  NPO Appropriate    Anesthesia Type: General.     - Airway: ETT   Induction: Intravenous.   Maintenance: Balanced.        Consents    Anesthesia Plan(s) and associated risks, benefits, and realistic alternatives discussed. Questions answered and patient/representative(s) expressed understanding.     - Discussed: Risks, Benefits and Alternatives for BOTH SEDATION and the PROCEDURE were discussed     - Discussed with:  Patient       Use of blood products discussed: Yes.     - Discussed with: Patient.     - Consented: consented to blood products     Postoperative Care    Pain management: IV analgesics.   PONV prophylaxis: Ondansetron (or other 5HT-3)     Comments:             Sandeep Hinds MD    I have reviewed the pertinent notes and labs in the chart from the past 30 " days and (re)examined the patient.  Any updates or changes from those notes are reflected in this note.    Clinically Significant Risk Factors Present on Admission                                 # Financial/Environmental Concerns:

## 2025-02-05 ENCOUNTER — PREP FOR PROCEDURE (OUTPATIENT)
Dept: GASTROENTEROLOGY | Facility: CLINIC | Age: 30
End: 2025-02-05
Payer: COMMERCIAL

## 2025-02-05 ENCOUNTER — PATIENT OUTREACH (OUTPATIENT)
Dept: GASTROENTEROLOGY | Facility: CLINIC | Age: 30
End: 2025-02-05
Payer: COMMERCIAL

## 2025-02-05 DIAGNOSIS — K86.89 PANCREATIC DUCT STONES: Primary | ICD-10-CM

## 2025-02-05 DIAGNOSIS — K86.3 PANCREATIC PSEUDOCYST: Primary | ICD-10-CM

## 2025-02-05 LAB
PATH REPORT.COMMENTS IMP SPEC: NORMAL
PATH REPORT.FINAL DX SPEC: NORMAL
PATH REPORT.GROSS SPEC: NORMAL
PATH REPORT.MICROSCOPIC SPEC OTHER STN: NORMAL
PATH REPORT.RELEVANT HX SPEC: NORMAL

## 2025-02-05 NOTE — PROGRESS NOTES
Please assist in scheduling:     Procedure/Imaging/Clinic: ERCP (Endoscopic Retrograde Cholangiopancreatography) with spyglass and EHL (Electrohydraulic Lithotripsy)  Physician: Angel  Timin weeks  Scope time: Provider average   Anesthesia: General  Dx: Pancreatic duct stones  Tier:3  Location: [facility]  OK to schedule while attending: Not specified by provider  Patient communication letter header:ERCP (Endoscopic Retrograde Cholangiopancreatography)

## 2025-02-05 NOTE — ADDENDUM NOTE
Addendum  created 02/05/25 0658 by Ernesto Easley APRN CRNA    Intraprocedure Meds edited      
bed rails

## 2025-02-05 NOTE — PROGRESS NOTES
Post EUS and ERCP on 2/3/25 with Dr. Ortiz.      Follow-up recommendations:   EUS:       - Perform an ERCP today for transpapillary stenting.                          - Will recommend to start levaquin 500 mg PO daily for                          7 days from tomorrow as a part of routine prophylaxis                          to prevent post cyst fluid aspiration infection                          - Will repeat CECT of abdomen in 2-3 weeks to evaluate                          residual collection. Will discuss with IR regarding                          possible left sided retroperitoneal drain vs                          endoscopic cystgastrostomy for endoscopic drainage                          - Pt will likely need PD stones to be extracted to                          prevent future attacks   ERCP:       - Will observe clinical course and repeat CECT in 2-3                          weeks. If tail pseudocyst reappears given the                          proximity to the renal capsule, will discuss with IR                          regarding placement of retroperitoneal drain                          - Will recommend a course of levaquin 500 mg PO daily                          for 7 days                          - Will repeat ERCP in 4 weeks for biliary and                          pancreatic stent removal and possible digital                          pancreatoscopy directed EHL                          - If patient develops fever, chills, or worsening                          abdominal pain or pancreatitis before scheduled ERCP,                          will recommend patient to call us immediately for                          consideration of an earlier urgent ERCP     Patient states: Unable to reach at this time. LVM with request to return call to clinic.     Orders placed:   CT abdomen pelvis with contrast to evaluate pancreatic pseudocyst. Due approximately 2/17/25. Imaging scheduling [phone].      Please assist in scheduling:     Procedure/Imaging/Clinic: ERCP (Endoscopic Retrograde Cholangiopancreatography) with spyglass and EHL (Electrohydraulic Lithotripsy)  Physician: Angel  Timin weeks  Scope time: Provider average   Anesthesia: General  Dx: Pancreatic duct stones  Tier:3  Location: [facility]  OK to schedule while attending: Not specified by provider  Patient communication letter header:ERCP (Endoscopic Retrograde Cholangiopancreatography)    Intent to offer: 3/3/25    Preop Plan: PAC referral    Med Review    Blood thinner -  None  ASA - None  Diabetic - None  Injectable or oral medications for weight loss - None    Patient Education r/t procedure: Alvaro Davison, ERIN Care Coordinator

## 2025-02-06 LAB
BACTERIA FLD CULT: ABNORMAL
BACTERIA FLD CULT: NORMAL

## 2025-02-06 NOTE — PROGRESS NOTES
Patient sent a [employer] message to team reporting ongoing pain following EUS and ERCP on 2/3/25. Contacted patient to discuss. Her voice is shakey and she sounds very uncomfortable. States that she has mostly been sleeping since procedure. Drinking some fluids. One emesis last night. Pain is persistent.     Advised that patient seek care in the emergency department as pain is not manageable at home. Discussed preference for her to report to [facility] if she can get here safely. Patient articulates an understanding.     Freda Davison RN Care Coordinator

## 2025-02-10 LAB — BACTERIA FLD CULT: NORMAL

## 2025-02-12 ENCOUNTER — MYC REFILL (OUTPATIENT)
Dept: FAMILY MEDICINE | Facility: CLINIC | Age: 30
End: 2025-02-12
Payer: COMMERCIAL

## 2025-02-12 DIAGNOSIS — K21.9 GASTROESOPHAGEAL REFLUX DISEASE, UNSPECIFIED WHETHER ESOPHAGITIS PRESENT: Primary | ICD-10-CM

## 2025-02-13 RX ORDER — OMEPRAZOLE 20 MG/1
20 CAPSULE, DELAYED RELEASE ORAL DAILY
Qty: 90 CAPSULE | Refills: 3 | Status: SHIPPED | OUTPATIENT
Start: 2025-02-13

## 2025-02-18 ENCOUNTER — PATIENT OUTREACH (OUTPATIENT)
Dept: GASTROENTEROLOGY | Facility: CLINIC | Age: 30
End: 2025-02-18
Payer: COMMERCIAL

## 2025-02-18 DIAGNOSIS — Z01.818 PRE-OP EXAM: Primary | ICD-10-CM

## 2025-02-18 NOTE — PROGRESS NOTES
Attempted to reach patient to discuss the following care recommendations. Unable to reach at this time. LVM requesting return call to clinic.     CT abdomen pelvis with contrast to evaluate pancreatic pseudocyst. Due approximately 25. Imaging scheduling [phone].      Please assist in scheduling:     Procedure/Imaging/Clinic: ERCP (Endoscopic Retrograde Cholangiopancreatography) with spyglass and EHL (Electrohydraulic Lithotripsy)  Physician: Angel  Timin weeks  Scope time: Provider average   Anesthesia: General  Dx: Pancreatic duct stones  Tier:3  Location: [facility]  OK to schedule while attending: Not specified by provider  Patient communication letter header:ERCP (Endoscopic Retrograde Cholangiopancreatography)     Intent to offer: 3/3/25     Preop Plan: PAC referral     Med Review     Blood thinner -  None  ASA - None  Diabetic - None  Injectable or oral medications for weight loss - None     Patient Education r/t procedure: Alvaro Davison, RN Care Coordinator

## 2025-02-18 NOTE — PROGRESS NOTES
Patient responded to [facility] that she would like to schedule procedure on 3/3/25.     Message sent to OR scheduling. PAC referral placed.     Freda Davison RN Care Coordinator

## 2025-02-24 ENCOUNTER — DOCUMENTATION ONLY (OUTPATIENT)
Dept: GASTROENTEROLOGY | Facility: CLINIC | Age: 30
End: 2025-02-24

## 2025-03-03 ENCOUNTER — HOSPITAL ENCOUNTER (OUTPATIENT)
Facility: CLINIC | Age: 30
Setting detail: OBSERVATION
Discharge: HOME | End: 2025-03-04
Attending: INTERNAL MEDICINE | Admitting: INTERNAL MEDICINE
Payer: COMMERCIAL

## 2025-03-03 ENCOUNTER — ANESTHESIA EVENT (OUTPATIENT)
Dept: SURGERY | Facility: CLINIC | Age: 30
End: 2025-03-03
Payer: COMMERCIAL

## 2025-03-03 ENCOUNTER — APPOINTMENT (OUTPATIENT)
Dept: CT IMAGING | Facility: CLINIC | Age: 30
End: 2025-03-03
Attending: INTERNAL MEDICINE
Payer: COMMERCIAL

## 2025-03-03 ENCOUNTER — ANESTHESIA (OUTPATIENT)
Dept: SURGERY | Facility: CLINIC | Age: 30
End: 2025-03-03
Payer: COMMERCIAL

## 2025-03-03 ENCOUNTER — APPOINTMENT (OUTPATIENT)
Dept: GENERAL RADIOLOGY | Facility: CLINIC | Age: 30
End: 2025-03-03
Attending: INTERNAL MEDICINE
Payer: COMMERCIAL

## 2025-03-03 DIAGNOSIS — K86.3 INFECTED PSEUDOCYST OF PANCREAS: Primary | ICD-10-CM

## 2025-03-03 PROBLEM — K86.1 CHRONIC PANCREATITIS (H): Status: ACTIVE | Noted: 2025-03-03

## 2025-03-03 LAB
ALBUMIN SERPL BCG-MCNC: 3.8 G/DL (ref 3.5–5.2)
ALP SERPL-CCNC: 130 U/L (ref 40–150)
ALT SERPL W P-5'-P-CCNC: 20 U/L (ref 0–50)
ANION GAP SERPL CALCULATED.3IONS-SCNC: 10 MMOL/L (ref 7–15)
AST SERPL W P-5'-P-CCNC: 21 U/L (ref 0–45)
BACTERIA FLD CULT: ABNORMAL
BILIRUB SERPL-MCNC: 0.2 MG/DL
BUN SERPL-MCNC: 14.9 MG/DL (ref 6–20)
CALCIUM SERPL-MCNC: 9.5 MG/DL (ref 8.8–10.4)
CHLORIDE SERPL-SCNC: 103 MMOL/L (ref 98–107)
CREAT SERPL-MCNC: 0.83 MG/DL (ref 0.51–0.95)
EGFRCR SERPLBLD CKD-EPI 2021: >90 ML/MIN/1.73M2
ERYTHROCYTE [DISTWIDTH] IN BLOOD BY AUTOMATED COUNT: 14.2 % (ref 10–15)
GLUCOSE SERPL-MCNC: 103 MG/DL (ref 70–99)
HCO3 SERPL-SCNC: 27 MMOL/L (ref 22–29)
HCT VFR BLD AUTO: 33.4 % (ref 35–47)
HGB BLD-MCNC: 11 G/DL (ref 11.7–15.7)
INR PPP: 1.03 (ref 0.85–1.15)
LIPASE SERPL-CCNC: 85 U/L (ref 13–60)
MCH RBC QN AUTO: 28.4 PG (ref 26.5–33)
MCHC RBC AUTO-ENTMCNC: 32.9 G/DL (ref 31.5–36.5)
MCV RBC AUTO: 86 FL (ref 78–100)
PLATELET # BLD AUTO: 377 10E3/UL (ref 150–450)
POTASSIUM SERPL-SCNC: 4.5 MMOL/L (ref 3.4–5.3)
PROT SERPL-MCNC: 7.7 G/DL (ref 6.4–8.3)
RBC # BLD AUTO: 3.88 10E6/UL (ref 3.8–5.2)
SODIUM SERPL-SCNC: 140 MMOL/L (ref 135–145)
WBC # BLD AUTO: 7.1 10E3/UL (ref 4–11)

## 2025-03-03 PROCEDURE — 85027 COMPLETE CBC AUTOMATED: CPT | Performed by: INTERNAL MEDICINE

## 2025-03-03 PROCEDURE — 250N000011 HC RX IP 250 OP 636: Mod: JZ | Performed by: PHYSICIAN ASSISTANT

## 2025-03-03 PROCEDURE — 87070 CULTURE OTHR SPECIMN AEROBIC: CPT | Performed by: INTERNAL MEDICINE

## 2025-03-03 PROCEDURE — 999N000181 XR SURGERY CARM FLUORO GREATER THAN 5 MIN W STILLS

## 2025-03-03 PROCEDURE — 99207 PR NO BILLABLE SERVICE THIS VISIT: CPT | Performed by: STUDENT IN AN ORGANIZED HEALTH CARE EDUCATION/TRAINING PROGRAM

## 2025-03-03 PROCEDURE — 272N000001 HC OR GENERAL SUPPLY STERILE: Performed by: INTERNAL MEDICINE

## 2025-03-03 PROCEDURE — 999N000141 HC STATISTIC PRE-PROCEDURE NURSING ASSESSMENT: Performed by: INTERNAL MEDICINE

## 2025-03-03 PROCEDURE — 360N000083 HC SURGERY LEVEL 3 W/ FLUORO, PER MIN: Performed by: INTERNAL MEDICINE

## 2025-03-03 PROCEDURE — C1874 STENT, COATED/COV W/DEL SYS: HCPCS | Performed by: INTERNAL MEDICINE

## 2025-03-03 PROCEDURE — 82435 ASSAY OF BLOOD CHLORIDE: CPT | Performed by: INTERNAL MEDICINE

## 2025-03-03 PROCEDURE — 258N000003 HC RX IP 258 OP 636: Performed by: INTERNAL MEDICINE

## 2025-03-03 PROCEDURE — 250N000011 HC RX IP 250 OP 636: Performed by: NURSE ANESTHETIST, CERTIFIED REGISTERED

## 2025-03-03 PROCEDURE — 258N000003 HC RX IP 258 OP 636: Performed by: NURSE ANESTHETIST, CERTIFIED REGISTERED

## 2025-03-03 PROCEDURE — 74177 CT ABD & PELVIS W/CONTRAST: CPT | Mod: 26 | Performed by: RADIOLOGY

## 2025-03-03 PROCEDURE — 250N000009 HC RX 250: Performed by: NURSE ANESTHETIST, CERTIFIED REGISTERED

## 2025-03-03 PROCEDURE — 255N000002 HC RX 255 OP 636: Performed by: INTERNAL MEDICINE

## 2025-03-03 PROCEDURE — 250N000011 HC RX IP 250 OP 636: Performed by: INTERNAL MEDICINE

## 2025-03-03 PROCEDURE — C1726 CATH, BAL DIL, NON-VASCULAR: HCPCS | Performed by: INTERNAL MEDICINE

## 2025-03-03 PROCEDURE — 85610 PROTHROMBIN TIME: CPT | Performed by: INTERNAL MEDICINE

## 2025-03-03 PROCEDURE — 370N000017 HC ANESTHESIA TECHNICAL FEE, PER MIN: Performed by: INTERNAL MEDICINE

## 2025-03-03 PROCEDURE — 250N000013 HC RX MED GY IP 250 OP 250 PS 637: Performed by: PHYSICIAN ASSISTANT

## 2025-03-03 PROCEDURE — 82247 BILIRUBIN TOTAL: CPT | Performed by: INTERNAL MEDICINE

## 2025-03-03 PROCEDURE — 258N000003 HC RX IP 258 OP 636: Performed by: PHYSICIAN ASSISTANT

## 2025-03-03 PROCEDURE — 250N000025 HC SEVOFLURANE, PER MIN: Performed by: INTERNAL MEDICINE

## 2025-03-03 PROCEDURE — 36415 COLL VENOUS BLD VENIPUNCTURE: CPT | Performed by: INTERNAL MEDICINE

## 2025-03-03 PROCEDURE — 74177 CT ABD & PELVIS W/CONTRAST: CPT

## 2025-03-03 PROCEDURE — C2617 STENT, NON-COR, TEM W/O DEL: HCPCS | Performed by: INTERNAL MEDICINE

## 2025-03-03 PROCEDURE — 250N000011 HC RX IP 250 OP 636: Performed by: STUDENT IN AN ORGANIZED HEALTH CARE EDUCATION/TRAINING PROGRAM

## 2025-03-03 PROCEDURE — 99223 1ST HOSP IP/OBS HIGH 75: CPT | Performed by: PHYSICIAN ASSISTANT

## 2025-03-03 PROCEDURE — 87075 CULTR BACTERIA EXCEPT BLOOD: CPT | Performed by: INTERNAL MEDICINE

## 2025-03-03 PROCEDURE — 999N000128 HC STATISTIC PERIPHERAL IV START W/O US GUIDANCE

## 2025-03-03 PROCEDURE — 83690 ASSAY OF LIPASE: CPT | Performed by: INTERNAL MEDICINE

## 2025-03-03 PROCEDURE — 250N000013 HC RX MED GY IP 250 OP 250 PS 637: Performed by: STUDENT IN AN ORGANIZED HEALTH CARE EDUCATION/TRAINING PROGRAM

## 2025-03-03 PROCEDURE — 250N000009 HC RX 250: Performed by: INTERNAL MEDICINE

## 2025-03-03 PROCEDURE — 710N000010 HC RECOVERY PHASE 1, LEVEL 2, PER MIN: Performed by: INTERNAL MEDICINE

## 2025-03-03 PROCEDURE — C1769 GUIDE WIRE: HCPCS | Performed by: INTERNAL MEDICINE

## 2025-03-03 DEVICE — ZIMMON PANCREATIC STENT
Type: IMPLANTABLE DEVICE | Site: PANCREATIC DUCT | Status: FUNCTIONAL
Brand: ZIMMON

## 2025-03-03 DEVICE — STENT AND ELECTROCAUTERY - ENHANCED DELIVERY SYSTEM
Type: IMPLANTABLE DEVICE | Site: STOMACH | Status: NON-FUNCTIONAL
Brand: AXIOS™
Removed: 2025-03-12

## 2025-03-03 DEVICE — VIABIL SHORT WIRE BILIARY ENDO 10MMX6CMX200CM 8.5FR
Type: IMPLANTABLE DEVICE | Site: STOMACH | Status: NON-FUNCTIONAL
Brand: GORE VIABIL BILIARY ENDOPROSTHESIS
Removed: 2025-03-12

## 2025-03-03 RX ORDER — NALOXONE HYDROCHLORIDE 0.4 MG/ML
0.2 INJECTION, SOLUTION INTRAMUSCULAR; INTRAVENOUS; SUBCUTANEOUS
Status: DISCONTINUED | OUTPATIENT
Start: 2025-03-03 | End: 2025-03-04 | Stop reason: HOSPADM

## 2025-03-03 RX ORDER — FLUMAZENIL 0.1 MG/ML
0.2 INJECTION, SOLUTION INTRAVENOUS
Status: DISCONTINUED | OUTPATIENT
Start: 2025-03-03 | End: 2025-03-03

## 2025-03-03 RX ORDER — ONDANSETRON 4 MG/1
4 TABLET, ORALLY DISINTEGRATING ORAL EVERY 6 HOURS PRN
Status: DISCONTINUED | OUTPATIENT
Start: 2025-03-03 | End: 2025-03-03

## 2025-03-03 RX ORDER — LISDEXAMFETAMINE DIMESYLATE 10 MG/1
10 CAPSULE ORAL
Status: DISCONTINUED | OUTPATIENT
Start: 2025-03-04 | End: 2025-03-04 | Stop reason: HOSPADM

## 2025-03-03 RX ORDER — OXYCODONE HYDROCHLORIDE 10 MG/1
10 TABLET ORAL
Status: DISCONTINUED | OUTPATIENT
Start: 2025-03-03 | End: 2025-03-03

## 2025-03-03 RX ORDER — LISDEXAMFETAMINE DIMESYLATE 20 MG/1
20 CAPSULE ORAL EVERY MORNING
Status: DISCONTINUED | OUTPATIENT
Start: 2025-03-04 | End: 2025-03-04 | Stop reason: HOSPADM

## 2025-03-03 RX ORDER — OMEPRAZOLE 20 MG/1
20 CAPSULE, DELAYED RELEASE ORAL DAILY
Status: DISCONTINUED | OUTPATIENT
Start: 2025-03-04 | End: 2025-03-03

## 2025-03-03 RX ORDER — FENTANYL CITRATE 50 UG/ML
INJECTION, SOLUTION INTRAMUSCULAR; INTRAVENOUS PRN
Status: DISCONTINUED | OUTPATIENT
Start: 2025-03-03 | End: 2025-03-03

## 2025-03-03 RX ORDER — ACETAMINOPHEN 650 MG/1
650 SUPPOSITORY RECTAL EVERY 6 HOURS PRN
Status: DISCONTINUED | OUTPATIENT
Start: 2025-03-03 | End: 2025-03-04 | Stop reason: HOSPADM

## 2025-03-03 RX ORDER — DEXAMETHASONE SODIUM PHOSPHATE 4 MG/ML
4 INJECTION, SOLUTION INTRA-ARTICULAR; INTRALESIONAL; INTRAMUSCULAR; INTRAVENOUS; SOFT TISSUE
Status: DISCONTINUED | OUTPATIENT
Start: 2025-03-03 | End: 2025-03-03

## 2025-03-03 RX ORDER — POLYETHYLENE GLYCOL 3350 17 G/17G
17 POWDER, FOR SOLUTION ORAL DAILY PRN
Status: DISCONTINUED | OUTPATIENT
Start: 2025-03-03 | End: 2025-03-04 | Stop reason: HOSPADM

## 2025-03-03 RX ORDER — NALOXONE HYDROCHLORIDE 0.4 MG/ML
0.4 INJECTION, SOLUTION INTRAMUSCULAR; INTRAVENOUS; SUBCUTANEOUS
Status: DISCONTINUED | OUTPATIENT
Start: 2025-03-03 | End: 2025-03-04 | Stop reason: HOSPADM

## 2025-03-03 RX ORDER — ONDANSETRON 4 MG/1
4 TABLET, ORALLY DISINTEGRATING ORAL EVERY 30 MIN PRN
Status: DISCONTINUED | OUTPATIENT
Start: 2025-03-03 | End: 2025-03-03

## 2025-03-03 RX ORDER — OXYCODONE HYDROCHLORIDE 5 MG/1
5 TABLET ORAL
Status: COMPLETED | OUTPATIENT
Start: 2025-03-03 | End: 2025-03-03

## 2025-03-03 RX ORDER — LEVOFLOXACIN 5 MG/ML
500 INJECTION, SOLUTION INTRAVENOUS ONCE
Status: COMPLETED | OUTPATIENT
Start: 2025-03-03 | End: 2025-03-03

## 2025-03-03 RX ORDER — SODIUM CHLORIDE, SODIUM LACTATE, POTASSIUM CHLORIDE, CALCIUM CHLORIDE 600; 310; 30; 20 MG/100ML; MG/100ML; MG/100ML; MG/100ML
INJECTION, SOLUTION INTRAVENOUS CONTINUOUS
Status: DISCONTINUED | OUTPATIENT
Start: 2025-03-03 | End: 2025-03-03

## 2025-03-03 RX ORDER — LEVOFLOXACIN 500 MG/1
500 TABLET, FILM COATED ORAL DAILY
Status: DISCONTINUED | OUTPATIENT
Start: 2025-03-03 | End: 2025-03-03

## 2025-03-03 RX ORDER — ONDANSETRON 4 MG/1
4 TABLET, ORALLY DISINTEGRATING ORAL EVERY 6 HOURS PRN
Status: DISCONTINUED | OUTPATIENT
Start: 2025-03-03 | End: 2025-03-04

## 2025-03-03 RX ORDER — PROPOFOL 10 MG/ML
INJECTION, EMULSION INTRAVENOUS PRN
Status: DISCONTINUED | OUTPATIENT
Start: 2025-03-03 | End: 2025-03-03

## 2025-03-03 RX ORDER — FENTANYL CITRATE 50 UG/ML
50 INJECTION, SOLUTION INTRAMUSCULAR; INTRAVENOUS ONCE
Status: DISCONTINUED | OUTPATIENT
Start: 2025-03-03 | End: 2025-03-03

## 2025-03-03 RX ORDER — ACETAMINOPHEN 325 MG/1
650 TABLET ORAL EVERY 6 HOURS PRN
Status: DISCONTINUED | OUTPATIENT
Start: 2025-03-03 | End: 2025-03-04 | Stop reason: HOSPADM

## 2025-03-03 RX ORDER — NALOXONE HYDROCHLORIDE 0.4 MG/ML
0.4 INJECTION, SOLUTION INTRAMUSCULAR; INTRAVENOUS; SUBCUTANEOUS
Status: DISCONTINUED | OUTPATIENT
Start: 2025-03-03 | End: 2025-03-03

## 2025-03-03 RX ORDER — FENTANYL CITRATE 50 UG/ML
50 INJECTION, SOLUTION INTRAMUSCULAR; INTRAVENOUS EVERY 5 MIN PRN
Status: DISCONTINUED | OUTPATIENT
Start: 2025-03-03 | End: 2025-03-03

## 2025-03-03 RX ORDER — FENTANYL CITRATE 50 UG/ML
25 INJECTION, SOLUTION INTRAMUSCULAR; INTRAVENOUS EVERY 5 MIN PRN
Status: DISCONTINUED | OUTPATIENT
Start: 2025-03-03 | End: 2025-03-03

## 2025-03-03 RX ORDER — ONDANSETRON 2 MG/ML
4 INJECTION INTRAMUSCULAR; INTRAVENOUS EVERY 6 HOURS PRN
Status: DISCONTINUED | OUTPATIENT
Start: 2025-03-03 | End: 2025-03-03

## 2025-03-03 RX ORDER — HYDROMORPHONE HYDROCHLORIDE 1 MG/ML
0.4 INJECTION, SOLUTION INTRAMUSCULAR; INTRAVENOUS; SUBCUTANEOUS EVERY 5 MIN PRN
Status: DISCONTINUED | OUTPATIENT
Start: 2025-03-03 | End: 2025-03-03

## 2025-03-03 RX ORDER — IOPAMIDOL 510 MG/ML
INJECTION, SOLUTION INTRAVASCULAR PRN
Status: DISCONTINUED | OUTPATIENT
Start: 2025-03-03 | End: 2025-03-03 | Stop reason: HOSPADM

## 2025-03-03 RX ORDER — CIPROFLOXACIN 2 MG/ML
INJECTION, SOLUTION INTRAVENOUS PRN
Status: DISCONTINUED | OUTPATIENT
Start: 2025-03-03 | End: 2025-03-03

## 2025-03-03 RX ORDER — NALOXONE HYDROCHLORIDE 0.4 MG/ML
0.2 INJECTION, SOLUTION INTRAMUSCULAR; INTRAVENOUS; SUBCUTANEOUS
Status: DISCONTINUED | OUTPATIENT
Start: 2025-03-03 | End: 2025-03-03

## 2025-03-03 RX ORDER — LEVOFLOXACIN 500 MG/1
500 TABLET, FILM COATED ORAL DAILY
Status: DISCONTINUED | OUTPATIENT
Start: 2025-03-04 | End: 2025-03-04 | Stop reason: HOSPADM

## 2025-03-03 RX ORDER — NALOXONE HYDROCHLORIDE 0.4 MG/ML
0.1 INJECTION, SOLUTION INTRAMUSCULAR; INTRAVENOUS; SUBCUTANEOUS
Status: DISCONTINUED | OUTPATIENT
Start: 2025-03-03 | End: 2025-03-03

## 2025-03-03 RX ORDER — LIDOCAINE 40 MG/G
CREAM TOPICAL
Status: DISCONTINUED | OUTPATIENT
Start: 2025-03-03 | End: 2025-03-04 | Stop reason: HOSPADM

## 2025-03-03 RX ORDER — SODIUM CHLORIDE 9 MG/ML
INJECTION, SOLUTION INTRAVENOUS CONTINUOUS
Status: DISCONTINUED | OUTPATIENT
Start: 2025-03-03 | End: 2025-03-03

## 2025-03-03 RX ORDER — IOPAMIDOL 755 MG/ML
80 INJECTION, SOLUTION INTRAVASCULAR ONCE
Status: COMPLETED | OUTPATIENT
Start: 2025-03-03 | End: 2025-03-03

## 2025-03-03 RX ORDER — PANTOPRAZOLE SODIUM 40 MG/1
40 TABLET, DELAYED RELEASE ORAL
Status: DISCONTINUED | OUTPATIENT
Start: 2025-03-04 | End: 2025-03-04 | Stop reason: HOSPADM

## 2025-03-03 RX ORDER — LIDOCAINE HYDROCHLORIDE 20 MG/ML
INJECTION, SOLUTION INFILTRATION; PERINEURAL PRN
Status: DISCONTINUED | OUTPATIENT
Start: 2025-03-03 | End: 2025-03-03

## 2025-03-03 RX ORDER — ONDANSETRON 2 MG/ML
4 INJECTION INTRAMUSCULAR; INTRAVENOUS EVERY 30 MIN PRN
Status: DISCONTINUED | OUTPATIENT
Start: 2025-03-03 | End: 2025-03-03

## 2025-03-03 RX ORDER — DEXAMETHASONE SODIUM PHOSPHATE 4 MG/ML
INJECTION, SOLUTION INTRA-ARTICULAR; INTRALESIONAL; INTRAMUSCULAR; INTRAVENOUS; SOFT TISSUE PRN
Status: DISCONTINUED | OUTPATIENT
Start: 2025-03-03 | End: 2025-03-03

## 2025-03-03 RX ORDER — SODIUM CHLORIDE, SODIUM LACTATE, POTASSIUM CHLORIDE, CALCIUM CHLORIDE 600; 310; 30; 20 MG/100ML; MG/100ML; MG/100ML; MG/100ML
INJECTION, SOLUTION INTRAVENOUS CONTINUOUS PRN
Status: DISCONTINUED | OUTPATIENT
Start: 2025-03-03 | End: 2025-03-03

## 2025-03-03 RX ORDER — ONDANSETRON 2 MG/ML
INJECTION INTRAMUSCULAR; INTRAVENOUS PRN
Status: DISCONTINUED | OUTPATIENT
Start: 2025-03-03 | End: 2025-03-03

## 2025-03-03 RX ORDER — LABETALOL HYDROCHLORIDE 5 MG/ML
10 INJECTION, SOLUTION INTRAVENOUS
Status: DISCONTINUED | OUTPATIENT
Start: 2025-03-03 | End: 2025-03-03

## 2025-03-03 RX ORDER — ONDANSETRON 2 MG/ML
4 INJECTION INTRAMUSCULAR; INTRAVENOUS EVERY 6 HOURS PRN
Status: DISCONTINUED | OUTPATIENT
Start: 2025-03-03 | End: 2025-03-04

## 2025-03-03 RX ORDER — LIDOCAINE 40 MG/G
CREAM TOPICAL
Status: DISCONTINUED | OUTPATIENT
Start: 2025-03-03 | End: 2025-03-03

## 2025-03-03 RX ORDER — PROCHLORPERAZINE MALEATE 10 MG
10 TABLET ORAL EVERY 6 HOURS PRN
Status: DISCONTINUED | OUTPATIENT
Start: 2025-03-03 | End: 2025-03-04

## 2025-03-03 RX ORDER — POLYETHYLENE GLYCOL 3350 17 G
2 POWDER IN PACKET (EA) ORAL
Status: DISCONTINUED | OUTPATIENT
Start: 2025-03-03 | End: 2025-03-03

## 2025-03-03 RX ORDER — SODIUM CHLORIDE, SODIUM LACTATE, POTASSIUM CHLORIDE, CALCIUM CHLORIDE 600; 310; 30; 20 MG/100ML; MG/100ML; MG/100ML; MG/100ML
INJECTION, SOLUTION INTRAVENOUS CONTINUOUS
Status: ACTIVE | OUTPATIENT
Start: 2025-03-03 | End: 2025-03-04

## 2025-03-03 RX ORDER — HYDROMORPHONE HCL IN WATER/PF 6 MG/30 ML
0.2 PATIENT CONTROLLED ANALGESIA SYRINGE INTRAVENOUS EVERY 4 HOURS PRN
Status: DISCONTINUED | OUTPATIENT
Start: 2025-03-03 | End: 2025-03-04 | Stop reason: HOSPADM

## 2025-03-03 RX ORDER — HYDROMORPHONE HYDROCHLORIDE 1 MG/ML
0.2 INJECTION, SOLUTION INTRAMUSCULAR; INTRAVENOUS; SUBCUTANEOUS EVERY 5 MIN PRN
Status: DISCONTINUED | OUTPATIENT
Start: 2025-03-03 | End: 2025-03-03

## 2025-03-03 RX ORDER — FENTANYL CITRATE 50 UG/ML
25-50 INJECTION, SOLUTION INTRAMUSCULAR; INTRAVENOUS ONCE
Status: COMPLETED | OUTPATIENT
Start: 2025-03-03 | End: 2025-03-03

## 2025-03-03 RX ORDER — INDOMETHACIN 50 MG/1
SUPPOSITORY RECTAL PRN
Status: DISCONTINUED | OUTPATIENT
Start: 2025-03-03 | End: 2025-03-03 | Stop reason: HOSPADM

## 2025-03-03 RX ORDER — OXYCODONE HYDROCHLORIDE 5 MG/1
5 TABLET ORAL EVERY 4 HOURS PRN
Status: DISCONTINUED | OUTPATIENT
Start: 2025-03-03 | End: 2025-03-04 | Stop reason: HOSPADM

## 2025-03-03 RX ADMIN — MIDAZOLAM 2 MG: 1 INJECTION INTRAMUSCULAR; INTRAVENOUS at 12:09

## 2025-03-03 RX ADMIN — Medication 10 MG: at 13:35

## 2025-03-03 RX ADMIN — ONDANSETRON 4 MG: 2 INJECTION INTRAMUSCULAR; INTRAVENOUS at 15:46

## 2025-03-03 RX ADMIN — ONDANSETRON 4 MG: 2 INJECTION INTRAMUSCULAR; INTRAVENOUS at 14:18

## 2025-03-03 RX ADMIN — PROPOFOL 150 MG: 10 INJECTION, EMULSION INTRAVENOUS at 12:18

## 2025-03-03 RX ADMIN — CIPROFLOXACIN 400 MG: 400 INJECTION, SOLUTION INTRAVENOUS at 13:11

## 2025-03-03 RX ADMIN — SODIUM CHLORIDE, POTASSIUM CHLORIDE, SODIUM LACTATE AND CALCIUM CHLORIDE: 600; 310; 30; 20 INJECTION, SOLUTION INTRAVENOUS at 23:56

## 2025-03-03 RX ADMIN — FENTANYL CITRATE 100 MCG: 50 INJECTION INTRAMUSCULAR; INTRAVENOUS at 12:18

## 2025-03-03 RX ADMIN — LEVOFLOXACIN 500 MG: 5 INJECTION, SOLUTION INTRAVENOUS at 20:54

## 2025-03-03 RX ADMIN — Medication 40 MG: at 12:18

## 2025-03-03 RX ADMIN — FENTANYL CITRATE 50 MCG: 50 INJECTION, SOLUTION INTRAMUSCULAR; INTRAVENOUS at 16:41

## 2025-03-03 RX ADMIN — SODIUM CHLORIDE, POTASSIUM CHLORIDE, SODIUM LACTATE AND CALCIUM CHLORIDE: 600; 310; 30; 20 INJECTION, SOLUTION INTRAVENOUS at 12:09

## 2025-03-03 RX ADMIN — DEXAMETHASONE SODIUM PHOSPHATE 4 MG: 4 INJECTION, SOLUTION INTRA-ARTICULAR; INTRALESIONAL; INTRAMUSCULAR; INTRAVENOUS; SOFT TISSUE at 12:32

## 2025-03-03 RX ADMIN — HYDROMORPHONE HYDROCHLORIDE 0.2 MG: 0.2 INJECTION, SOLUTION INTRAMUSCULAR; INTRAVENOUS; SUBCUTANEOUS at 20:46

## 2025-03-03 RX ADMIN — IOPAMIDOL 80 ML: 755 INJECTION, SOLUTION INTRAVENOUS at 19:08

## 2025-03-03 RX ADMIN — LIDOCAINE HYDROCHLORIDE 60 MG: 20 INJECTION, SOLUTION INFILTRATION; PERINEURAL at 12:18

## 2025-03-03 RX ADMIN — Medication 100 MG: at 14:18

## 2025-03-03 RX ADMIN — Medication 10 MG: at 12:32

## 2025-03-03 RX ADMIN — Medication 10 MG: at 13:00

## 2025-03-03 RX ADMIN — ACETAMINOPHEN 650 MG: 325 TABLET, FILM COATED ORAL at 20:46

## 2025-03-03 RX ADMIN — DEXMEDETOMIDINE HYDROCHLORIDE 12 MCG: 100 INJECTION, SOLUTION INTRAVENOUS at 13:00

## 2025-03-03 RX ADMIN — OXYCODONE HYDROCHLORIDE 5 MG: 5 TABLET ORAL at 18:28

## 2025-03-03 ASSESSMENT — ACTIVITIES OF DAILY LIVING (ADL)
ADLS_ACUITY_SCORE: 50
ADLS_ACUITY_SCORE: 51
ADLS_ACUITY_SCORE: 50
ADLS_ACUITY_SCORE: 51
ADLS_ACUITY_SCORE: 50
ADLS_ACUITY_SCORE: 50
ADLS_ACUITY_SCORE: 51
ADLS_ACUITY_SCORE: 50

## 2025-03-03 NOTE — ANESTHESIA PREPROCEDURE EVALUATION
Anesthesia Pre-Procedure Evaluation    Patient: Rani Lisa   MRN: [id number] : 1995        Procedure : Procedure(s):  ENDOSCOPIC RETROGRADE CHOLANGIOPANCREATOGRAPHY, WITH ELECTROHYDRAULIC LITHOTRIPSY USING DIRECT VISUALIZATION SYSTEM          Past Medical History:   Diagnosis Date     Abdominal fluid collection      ADHD (attention deficit hyperactivity disorder)      Alcohol dependence (H)      Alcoholic pancreatitis      Depression      Hyperglycemia      Hypomagnesemia      Hyponatremia      Leukocytosis      Migraine      Nexplanon insertion      Pancreatic duct stricture       Past Surgical History:   Procedure Laterality Date     COLPOSCOPY       ENDOSCOPIC RETROGRADE CHOLANGIOPANCREATOGRAM N/A 2/3/2025    Procedure: ENDOSCOPIC RETROGRADE CHOLANGIOPANCREATOGRAPHY WITH BILIARY SPHINCTEROTOMY AND STENT PLACEMENT, PANCREATIC DUCT SPHINCTEROTOMY, STONE REMOVAL, STENT PLACEMENT;  Surgeon: Guru Elena Ortiz MD;  Location: [facility]     ESOPHAGOSCOPY, GASTROSCOPY, DUODENOSCOPY (EGD), COMBINED N/A 2/3/2025    Procedure: ESOPHAGOGASTRODUODENOSCOPY, WITH FINE NEEDLE ASPIRATION BIOPSY, WITH ENDOSCOPIC ULTRASOUND GUIDANCE;  Surgeon: Guru Elena Ortiz MD;  Location: [facility]     IR VISCERAL ANGIOGRAM  2024     TONSILLECTOMY & ADENOIDECTOMY        Allergies   Allergen Reactions     Bactrim [Sulfamethoxazole-Trimethoprim] Rash      Social History     Tobacco Use     Smoking status: Every Day     Current packs/day: 0.50     Types: Cigarettes     Smokeless tobacco: Never   Substance Use Topics     Alcohol use: Not Currently      Wt Readings from Last 1 Encounters:   25 58.6 kg (129 lb 3 oz)        Anesthesia Evaluation   Pt has had prior anesthetic. Type: General.        ROS/MED HX  ENT/Pulmonary:       Neurologic:       Cardiovascular:       METS/Exercise Tolerance: 4 - Raking leaves, gardening    Hematologic:       Musculoskeletal:       GI/Hepatic: Comment:  "Pancreatitis      Renal/Genitourinary:     (+) renal disease,             Endo:       Psychiatric/Substance Use:       Infectious Disease:       Malignancy:       Other:            Physical Exam    Airway        Mallampati: I   TM distance: > 3 FB   Neck ROM: full   Mouth opening: > 3 cm    Respiratory Devices and Support         Dental       (+) Modest Abnormalities - crowns, retainers, 1 or 2 missing teeth    B=Bridge, C=Chipped, L=Loose, M=Missing    Cardiovascular          Rhythm and rate: regular and normal     Pulmonary           breath sounds clear to auscultation         OUTSIDE LABS:  CBC:   Lab Results   Component Value Date    WBC 8.3 02/03/2025    WBC 8.8 02/02/2024    HGB 12.1 02/03/2025    HGB 8.9 (L) 02/02/2024    HCT 37.0 02/03/2025    HCT 30.0 (L) 02/02/2024     02/03/2025     02/02/2024     BMP:   Lab Results   Component Value Date     02/03/2025     01/24/2024    POTASSIUM 4.3 02/03/2025    POTASSIUM 4.1 01/24/2024    CHLORIDE 105 02/03/2025    CHLORIDE 106 01/24/2024    CO2 22 02/03/2025    CO2 22 01/24/2024    BUN 12.5 02/03/2025    BUN 6.0 01/24/2024    CR 0.69 02/03/2025    CR 0.60 01/24/2024     (H) 02/03/2025     (H) 01/24/2024     COAGS:   Lab Results   Component Value Date    PTT 28 01/19/2024    INR 1.02 01/19/2024     POC: No results found for: \"BGM\", \"HCG\", \"HCGS\"  HEPATIC:   Lab Results   Component Value Date    ALBUMIN 4.1 02/03/2025    PROTTOTAL 6.8 02/03/2025    ALT 16 02/03/2025    AST 23 02/03/2025    ALKPHOS 74 02/03/2025    BILITOTAL 0.3 02/03/2025     OTHER:   Lab Results   Component Value Date    LACT 0.7 01/19/2024    DUSTIN 9.2 02/03/2025    PHOS 4.7 (H) 01/19/2024    MAG 1.9 01/24/2024    LIPASE 73 (H) 02/03/2025    AMYLASE 109 (H) 02/03/2025    TSH 17.50 (H) 01/22/2024    T4 1.19 01/22/2024       Anesthesia Plan    ASA Status:  2    NPO Status:  NPO Appropriate    Anesthesia Type: General.     - Airway: ETT   Induction: Intravenous, " Propofol.   Maintenance: Balanced.   Techniques and Equipment:     - Airway: Video-Laryngoscope     - Lines/Monitors: 2nd IV, BIS     Consents    Anesthesia Plan(s) and associated risks, benefits, and realistic alternatives discussed. Questions answered and patient/representative(s) expressed understanding.     - Discussed: Risks, Benefits and Alternatives for BOTH SEDATION and the PROCEDURE were discussed     - Discussed with:  Patient      - Extended Intubation/Ventilatory Support Discussed: No.      - Patient is DNR/DNI Status: No     Use of blood products discussed: No .     Postoperative Care    Pain management: IV analgesics, Multi-modal analgesia.   PONV prophylaxis: Ondansetron (or other 5HT-3), Dexamethasone or Solumedrol, Background Propofol Infusion     Comments:               Lizandro Zarate MD    I have reviewed the pertinent notes and labs in the chart from the past 30 days and (re)examined the patient.  Any updates or changes from those notes are reflected in this note.    Clinically Significant Risk Factors Present on Admission                                 # Financial/Environmental Concerns:

## 2025-03-03 NOTE — ANESTHESIA CARE TRANSFER NOTE
Patient: Rani Lisa    Procedure: Procedure(s):  ENDOSCOPIC RETROGRADE CHOLANGIOPANCREATOGRAPHY, WITH Pancreatic Stent Exchange, Biliary Stent Removal, and sludge removal.  Endoscopic ultrasound upper gastrointestinal tract (GI) with fine needle aspiration and cystgastrostomy stent placement.       Diagnosis: Pancreatic duct stones [K86.89]  Diagnosis Additional Information: No value filed.    Anesthesia Type:   General     Note:    Oropharynx: oropharynx clear of all foreign objects  Level of Consciousness: awake  Oxygen Supplementation: face mask  Level of Supplemental Oxygen (L/min / FiO2): 6  Independent Airway: airway patency satisfactory and stable  Dentition: dentition unchanged  Vital Signs Stable: post-procedure vital signs reviewed and stable    Patient transferred to: PACU    Handoff Report: Identifed the Patient, Identified the Reponsible Provider, Reviewed the pertinent medical history, Discussed the surgical course, Reviewed Intra-OP anesthesia mangement and issues during anesthesia, Set expectations for post-procedure period and Allowed opportunity for questions and acknowledgement of understanding      Vitals:  Vitals Value Taken Time   BP     Temp     Pulse     Resp     SpO2         Electronically Signed By: SIMON Lyle CRNA  March 3, 2025  2:32 PM

## 2025-03-03 NOTE — BRIEF OP NOTE
[facility]    Brief Operative Note    Pre-operative diagnosis: Pancreatic duct stones [K86.89]  Post-operative diagnosis Same as pre-operative diagnosis    Procedure: ENDOSCOPIC RETROGRADE CHOLANGIOPANCREATOGRAPHY, WITH Pancreatic Stent Exchange, Biliary Stent Removal, and sludge removal., N/A - Mouth  Endoscopic ultrasound upper gastrointestinal tract (GI) with fine needle aspiration and cystgastrostomy stent placement., N/A - Esophagus    Surgeon: Surgeons and Role:     * Guru Elena Ortiz MD - Primary     * Riccardo Valdez MD - Assisting     * Sonya Langley MD - Fellow - Assisting  Anesthesia: General   Estimated Blood Loss: None    Drains: None  Specimens:   ID Type Source Tests Collected by Time Destination   A : Pseudocyst Fine Needle Aspiration Pancreas ANAEROBIC BACTERIAL CULTURE ROUTINE, AEROBIC BACTERIAL CULTURE ROUTINE Guru Elena Ortiz MD 3/3/2025  1:15 PM      Findings:     ERCP  - Previously placed biliary and pancreatic stents were in good   - Patent prior biliry sphincterotomy   - Irregular main pancreatic duct on pancreatogram without evidence of PD leak   - Successful removal of FCMS from the bile duct and exchange of the pancreatic duct     EUS  - Loculated heterogenous peripancreatic fluid collection was noted adjacent to the gastric fundus with a narrow window for drainage (between the spleen and splenic artery).   - Fine needle aspiration of the fluid (using a 19 gauge needle) with removal of 15-20 ml of purulent discharge (sent for microbiology and cultures)   - Successful cystogastrostomy using 15 x 10 mm AXIOS stent over a wire. The distal flange of the AXIOS did not fully open and coaxial 10 x 60 mm Viabil FCMS and 7 Fr x 15 cm ZIMMON double pigtailed stent were placed.   - Large amount of pus and purulent drainage was noted through the AXIOS, in addition to bleeding that was treated with  topical epinephrine and successfully stopped.     Recommendations:   - Observe patient in PACU   - Clear liquid diet   - Discharge planning based on clinical condition (overnight observation vs home discharge)   - Levofloxacin 500 mg daily for 5 days   - Abdominal CT scan and EGD in 7 day for stent removal and assessment of fluid collection and cystogastrostomy track   - The findings and recommendations were discussed with the patient.      Complications: None.  Implants:   Implant Name Type Inv. Item Serial No.  Lot No. LRB No. Used Action   GORE VIABIL SHORT WIRE BILIARY ENDOPROSTHESIS Stent  [id number] [employer]  N/A 1 Explanted   STENT ZIMMON PANCREA [id number] SGL PIGTAIL [id number] - [id number] Stent STENT ZIMMON PANCREA [id number] SGL PIGTAIL [id number]  [employer] [id number] N/A 1 Explanted   STENT ZIMMON PANCREA [id number] SGL PIGTAIL [id number] - [id number] Stent STENT ZIMMON PANCREA [id number] SGL PIGTAIL [id number]  [employer] [id number] N/A 1 Implanted   STENT ESU AXIOS W/DEL SYS [id number] 10.8FR 138CM [id number] - [id number] Stent STENT ESU AXIOS W/DEL SYS [id number] 10.8FR 138CM [id number]  [employer] [id number] N/A 1 Implanted   STENT ZIMMON BILIARY [id number] DBL PIGTAIL - [id number] Stent STENT ZIMMON BILIARY [id number] DBL PIGTAIL  [employer] [id number] N/A 1 Implanted and Explanted   STENT ESPHGL HANAROSTENT COV [id number]--180 - [id number] Stent STENT ESPHGL HANAROSTENT COV [id number]  [facility] [id number]  1 [place]   GORE VIABIL BILIARY ENDOPROSTHESIS Stent  [id number] G[employer]  N/A 1 Implanted   STENT ZIMMON BILIARY [id number] DBL PIGTAIL - [id number] Stent STENT ZIMMON BILIARY [id number] DBL PIGTAIL  [employer] [id number] N/A 1 Implanted

## 2025-03-03 NOTE — PROGRESS NOTES
Pre-procedure note:    29 year old female with a PMH of chronic calcific pancreatitis, complicated by pseudocyst and pseudoaneurysm s/p coiling embolization was referred by interventional radiology for further evaluation for possible endoscopic transluminal drainage of pseudocyst. Fluid collection appears and she underwent EUS (2/3/25) that showed a 11 cm pseudocyst and 1.4 cm perigastric intramural collection. EUS FNA alone was performed with drainage of 200 ml of fluid. Multiple PD stones were seen in pancreas head. ERCP revealed ventral PD changes of chronic pancreatitis and possible small PD leak in the body/tail of the pancreas s/p pancreatic sphincterotomy and placement of 5 Fr x 10 cm single pigtailed ZIMMON stent. Biliary sphincterotomy was also performed and complicated by sphincterotomy bleeding s/p topical epinephrine and 8 x 60 mm Viabil FCMS.     Ref: POLY KNIGHT MD

## 2025-03-03 NOTE — H&P
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 On 2/3/25 patient underwent EUS w/ fine needle aspiration of pseudocyst and ERCP w/ ventral duct cannulation, pancreatic sphincterotomy, placement 5 Fr by 10cm single pigtailed Zimmon stent in PD, biliary sphincterotomy c/b sphinctertomy bleeding s/p 8mm by 6cm Viabil covered metal biliary stent w/ side holes placed into common bile duct to tampnade and prevent further bleeding. She was admitted to [facility] for acute on chronic pancreatitis 2/22/25-2/24/25 managed with NPO and IVF, discharged home. She presented to [facility] 3/3/25 for scheduled EUS w/ fine needle aspiration and cystgastrostomy stent placement of pancreatic pseudocyst, and ERCP w/ pancreatic stent exchange, biliary stent removal and sludge removal. Subsequently admitted to medicine service.    Large Pancreatic Pseudocyst s/p EUS w/ Fine Needle Aspiration and Cystgastrostomy (3/3/25)  Probable Pancreatic Pseudocyst Infection  Pancreatic Duct Stricture s/p ERCP w/ Pancreatic Stent Exchange, Biliary Stent Removal and Sludge Removal (3/3/25)  Hx Chronic Calcific Pancreatitis  On EUS she underwent fine needle aspiration of the fluid (using a 19 gauge needle) with removal of 15-20 ml of purulent discharge. Cystogastrostomy using 15 x 10 mm AXIOS stent over a wire was performed. The distal flange of the AXIOS did not fully open and  coaxial 10 x 60 mm Viabil FCMS and 7 Fr x 15 cm ZIMMON double pigtailed stent were placed. Large amount of pus and purulent drainage was noted through the AXIOS, in addition to bleeding that was treated with topical epinephrine. CT A/P w post-op prelim with no evidence of perforation, did re-demonstrate large pancreatic pseudocyst (extending from the pancreatic tail to the left  Kidney w/ significant mass effect on the left kidney); notable air within the gallbladder and along the portal vein, likely iatrogenic post-op. WBC 7.1. Afebrile.    - Appreciate GI procedure and recs:  - Follow up FNA cultures (aerobic, anaerobic)  - Clear liquid diet   - Levofloxacin 500mg daily for 5 days   - Abdominal CT scan and EGD in 7 day for stent removal and assessment of fluid collection and cystogastrostomy track    - Given there were significant bleeding concerns during previous ERCP in February, please continue to hold all anticoagulation and antiplatelets.    Anemia  Hemoglobin 11.0 at admission (previously 9.0 2/24/25).   - Recheck CBC in the morning    ADHD   - Continue PTA Vyvanse 20mg in the am and 10mg with lunch    GERD   - Continue PTA PPI    Nicotine Dependence  Smokes ~1/4 pack per day.   - Nicotine patch available          Diet: Clear Liquid Diet    DVT Prophylaxis: Pneumatic Compression Devices  Alcazar Catheter: Not present  Lines: None     Cardiac Monitoring: ACTIVE order. Indication: Procedural area  Code Status: Full Code    Clinically Significant Risk Factors Present on Admission                                 # Financial/Environmental Concerns:           Disposition Plan     Medically Ready for Discharge: Anticipated in 2-4 Days         The patient's care was discussed with the Attending Physician, Dr. Selby .    Dayton Harding PA-C  Hospitalist Service, [facility]  Securely message with [employer] (more info)  Text page via [facility] Paging/Directory   See  signed in provider for up to date coverage information    ______________________________________________________________________    Chief Complaint   S/p ERCP and EUS    History is obtained from the patient and EMR    History of Present Illness   Rani Lisa is a 29 year old female patient with a past medical history significant for alcohol use disorder in remission, GERD, migraine headaches, ADHD, depression, Nexplanon insertion, nicotine dependence (smoking ~1/4 PPD), cannabis use, 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 On 2/3/25 patient underwent EUS w/ fine needle aspiration of pseudocyst and ERCP w/ ventral duct cannulation, pancreatic sphincterotomy, placement 5 Fr by 10cm single pigtailed Zimmon stent in PD, biliary sphincterotomy c/b sphinctertomy bleeding s/p 8mm by 6cm Viabil covered metal biliary stent w/ side holes placed into common bile duct to tampnade and prevent further bleeding. She was admitted to [facility] for acute on chronic pancreatitis 2/22/25-2/24/25 managed with NPO and IVF, discharged home. She presented to [facility] 3/3/25 for scheduled EUS w/ fine needle aspiration and cystgastrostomy stent placement of pancreatic pseudocyst, and ERCP w/ pancreatic stent exchange, biliary stent removal and sludge removal. Subsequently admitted to medicine service.    Patient seen in PACU. She reports some post-op epigastric and LUQ abdominal pain that is well controlled with one dose oxycodone. No nausea, vomiting, chest pain, shortness of breath. No fevers.    Past Medical History    Past Medical History:   Diagnosis Date    Abdominal fluid collection     ADHD (attention deficit hyperactivity disorder)     Alcohol dependence (H)     Alcoholic pancreatitis     Depression     Hyperglycemia     Hypomagnesemia     Hyponatremia      Leukocytosis     Migraine     Nexplanon insertion     Pancreatic duct stricture        Past Surgical History   Past Surgical History:   Procedure Laterality Date    COLPOSCOPY      ENDOSCOPIC RETROGRADE CHOLANGIOPANCREATOGRAM N/A 2/3/2025    Procedure: ENDOSCOPIC RETROGRADE CHOLANGIOPANCREATOGRAPHY WITH BILIARY SPHINCTEROTOMY AND STENT PLACEMENT, PANCREATIC DUCT SPHINCTEROTOMY, STONE REMOVAL, STENT PLACEMENT;  Surgeon: Guru Elena Ortiz MD;  Location: [facility] OR    ESOPHAGOSCOPY, GASTROSCOPY, DUODENOSCOPY (EGD), COMBINED N/A 2/3/2025    Procedure: ESOPHAGOGASTRODUODENOSCOPY, WITH FINE NEEDLE ASPIRATION BIOPSY, WITH ENDOSCOPIC ULTRASOUND GUIDANCE;  Surgeon: Guru Elena Ortiz MD;  Location: [facility] OR    IR VISCERAL ANGIOGRAM  01/19/2024    TONSILLECTOMY & ADENOIDECTOMY         Prior to Admission Medications   Prior to Admission Medications   Prescriptions Last Dose Informant Patient Reported? Taking?   levofloxacin (LEVAQUIN) 500 MG tablet More than a month  No Yes   Sig: Take 1 tablet (500 mg) by mouth daily.   lisdexamfetamine (VYVANSE) 10 MG capsule 3/2/2025 at 12:00 PM  No Yes   Sig: Take 1 capsule (10 mg) by mouth daily (with lunch).   lisdexamfetamine (VYVANSE) 10 MG capsule   No No   Sig: Take 1 capsule (10 mg) by mouth daily.   lisdexamfetamine (VYVANSE) 10 MG capsule   No No   Sig: Take 1 capsule (10 mg) by mouth daily.   lisdexamfetamine (VYVANSE) 20 MG capsule 3/2/2025 at  9:00 AM  No Yes   Sig: Take 1 capsule (20 mg) by mouth every morning.   lisdexamfetamine (VYVANSE) 20 MG capsule   No No   Sig: Take 1 capsule (20 mg) by mouth daily (with lunch).   lisdexamfetamine (VYVANSE) 20 MG capsule   No No   Sig: Take 1 capsule (20 mg) by mouth daily (with lunch).   multivitamin w/minerals (MULTI-VITAMIN) tablet 2/28/2025  Yes No   Sig: Take 1 tablet by mouth daily   omeprazole (PRILOSEC) 20 MG DR capsule 3/3/2025  No Yes   Sig: Take 1 capsule (20 mg) by mouth daily.   ondansetron  (ZOFRAN ODT) 4 MG ODT tab 3/3/2025 at  9:00 AM  Yes Yes   Sig: Place 4 mg under the tongue.      Facility-Administered Medications: None        Review of Systems    The 10 point Review of Systems is negative other than noted in the HPI or here.    Social History   I have reviewed this patient's social history and updated it with pertinent information if needed.  Social History     Tobacco Use    Smoking status: Every Day     Current packs/day: 0.50     Types: Cigarettes    Smokeless tobacco: Never   Vaping Use    Vaping status: Every Day    Substances: Nicotine, Flavoring    Devices: Disposable   Substance Use Topics    Alcohol use: Not Currently    Drug use: Yes     Types: Marijuana     Comment: daily marijuana smoking         Family History   I have reviewed this patient's family history and updated it with pertinent information if needed.  Family History   Problem Relation Age of Onset    Thrombosis Paternal Grandmother     Thrombosis Paternal Grandfather     Diabetes Other     Anesthesia Reaction No family hx of     Bleeding Disorder No family hx of          Allergies   Allergies   Allergen Reactions    Bactrim [Sulfamethoxazole-Trimethoprim] Rash        Physical Exam   Vital Signs: Temp: 98.7  F (37.1  C) Temp src: Oral BP: 121/76 Pulse: 75   Resp: 16 SpO2: 99 % O2 Device: None (Room air) Oxygen Delivery: 2 LPM  Weight: 129 lbs 3.03 oz    GENERAL: Alert and oriented x 3. Well nourished, well developed.  No acute distress.    HEENT: Normocephalic, atraumatic. Anicteric sclera. Mucous membranes moist.   CV: RRR. S1, S2. No murmurs appreciated.   RESPIRATORY: Effort normal on room air. Lungs CTAB with no wheezing, rales, or rhonchi.   GI: Abdomen soft and non distended, bowel sounds present x all 4 quadrants. No tenderness, rebound, or guarding.   NEUROLOGICAL: No focal deficits noted at bedside. Follows commands.   MUSCULOSKELETAL: No joint swelling or tenderness reported. Moves all extremities.   EXTREMITIES: No  gross deformities. No peripheral edema.   SKIN: Grossly warm, dry, and intact. No jaundice. No rashes.     Medical Decision Making       76 MINUTES SPENT BY ME on the date of service doing chart review, history, exam, documentation & further activities per the note.      Data   Imaging results reviewed over the past 24 hrs:   Recent Results (from the past 24 hours)   XR Surgery CHARLENE G/T 5 Min Fluoro w Stills    Narrative    This exam was marked as non-reportable because it will not be read by a   radiologist or a [place] non-radiologist provider.         CT Abdomen Pelvis w Contrast    Narrative    EXAMINATION: CT ABDOMEN PELVIS W CONTRAST, 3/3/2025 7:19 PM    INDICATION: Pancreatic pseudocyst s/p cystogastrostomy tubes placment.  Please evaluate for stent migration, perforation or leak    COMPARISON STUDY: 1/31/2025    TECHNIQUE: CT scan of the abdomen and pelvis was performed on  multidetector CT scanner using volumetric acquisition technique and  images were reconstructed in multiple planes with variable thickness  and reviewed on dedicated workstations.     CONTRAST: [id number]: 80mL injected IV without oral contrast    CT scan radiation dose is optimized to minimum requisite dose using  automated dose modulation techniques.    FINDINGS:    Lower thorax: Bibasilar atelectasis.    Liver: No mass. Periportal air, likely iatrogenic. No intrahepatic  biliary ductal dilation.    Biliary System: Air within the gallbladder, likely iatrogenic. No  extrahepatic biliary ductal dilation.    Pancreas: Pancreatic duct stent. Pancreatic tail pseudocyst with  complex fluid within and wall thickening extends to the left renal  capsule now measuring 6.7 x 4.3 cm. Cystogastrostomy tube is in place.  Similar compression of the left kidney. Prominent adjacent  retroperitoneal lymph nodes seen on series 4 image 145. No mass or  pancreatic ductal dilation.    Adrenal glands: No mass or nodules    Spleen: Normal.    Kidneys: No  suspicious mass, obstructing calculus or hydronephrosis.    Gastrointestinal tract : Loculated fluid collection adjacent to the  stomach and pancreas seen on series 4 image 114 measuring 2.3 x 1.7 cm  Normal appendix. Normal caliber small bowel.    Mesentery/peritoneum/retroperitoneum: No mass. No free fluid or air.    Lymph nodes: No significant lymphadenopathy.    Vasculature: Patent major abdominal vasculature.    Pelvis: Urinary bladder is normal.    Osseous structures: No aggressive or acute osseous lesion.      Soft tissues: Within normal limits.      Impression    IMPRESSION:   1. Redemonstration of pancreatic pseudocysts along the stomach and  large pseudocyst extending from the pancreatic tail to the left  kidney. Significant mass effect on the left kidney is unchanged.  2. Air within the gallbladder and along the portal vein, likely  iatrogenic.   3. Cystogastrostomy tube in place.    I have personally reviewed the examination and initial interpretation  and I agree with the findings.    NEHA PENDLETON MD         SYSTEM ID:  [id number]     Recent Labs   Lab 03/03/25  1117   WBC 7.1   HGB 11.0*   MCV 86      INR 1.03      POTASSIUM 4.5   CHLORIDE 103   CO2 27   BUN 14.9   CR 0.83   ANIONGAP 10   DUSTIN 9.5   *   ALBUMIN 3.8   PROTTOTAL 7.7   BILITOTAL 0.2   ALKPHOS 130   ALT 20   AST 21   LIPASE 85*

## 2025-03-03 NOTE — ANESTHESIA PROCEDURE NOTES
Airway       Patient location during procedure: OR       Procedure Start/Stop Times: 3/3/2025 12:21 PM  Staff -        CRNA: Sagar Ramos APRN CRNA       Performed By: CRNAIndications and Patient Condition       Indications for airway management: romy-procedural       Induction type:intravenous       Mask difficulty assessment: 1 - vent by mask    Final Airway Details       Final airway type: endotracheal airway       Successful airway: ETT - single  Endotracheal Airway Details        ETT size (mm): 7.0       Successful intubation technique: video laryngoscopy       VL Blade Size: Glidescope 3       Grade View of Cords: 1       Adjucts: stylet       Position: Right       Measured from: lips       Secured at (cm): 21       Bite block used: None    Post intubation assessment        Placement verified by: capnometry        Number of attempts at approach: 1       Number of other approaches attempted: 0       Secured with: tape       Ease of procedure: easy       Dentition: Intact    Medication(s) Administered   Medication Administration Time: 3/3/2025 12:21 PM

## 2025-03-03 NOTE — ANESTHESIA POSTPROCEDURE EVALUATION
Patient: Rani Lisa    Procedure: Procedure(s):  ENDOSCOPIC RETROGRADE CHOLANGIOPANCREATOGRAPHY, WITH Pancreatic Stent Exchange, Biliary Stent Removal, and sludge removal.  Endoscopic ultrasound upper gastrointestinal tract (GI) with fine needle aspiration and cystgastrostomy stent placement.       Anesthesia Type:  General    Note:  Disposition: Outpatient   Postop Pain Control: Uneventful            Sign Out: Well controlled pain   PONV: No   Neuro/Psych: Uneventful            Sign Out: Acceptable/Baseline neuro status   Airway/Respiratory: Uneventful            Sign Out: Acceptable/Baseline resp. status   CV/Hemodynamics: Uneventful            Sign Out: Acceptable CV status; No obvious hypovolemia; No obvious fluid overload   Other NRE: NONE   DID A NON-ROUTINE EVENT OCCUR?            Last vitals:  Vitals Value Taken Time   /86 03/03/25 1530   Temp 36.4  C (97.6  F) 03/03/25 1515   Pulse 75 03/03/25 1533   Resp 13 03/03/25 1533   SpO2 98 % 03/03/25 1533   Vitals shown include unfiled device data.    Electronically Signed By: Abad De Anda MD  March 3, 2025  3:34 PM

## 2025-03-03 NOTE — DISCHARGE INSTRUCTIONS
Contacting your Doctor -   To contact a doctor, call Dr. Guru Ortiz @ [phone] (GI Clinic) or [phone]  or:  [phone] and ask for the resident on call for Gastroenterology (answered 24 hours a day)   Emergency Department:  [facility]: [phone]  [facility]: [phone] 911 if you are in need of immediate or emergent help     Recommendations:   - Observe patient in PACU   - Clear liquid diet   - Discharge planning based on clinical condition (overnight observation vs home discharge)   - Levofloxacin 500 mg daily for 5 days   - Abdominal CT scan and EGD in 7 day for stent removal and assessment of fluid collection and cystogastrostomy track   - The findings and recommendations were discussed with the patient.     Price (Do Not Change): 0.00 Instructions: This plan will send the code FBSE to the PM system.  DO NOT or CHANGE the price. Detail Level: Generalized

## 2025-03-04 VITALS
HEIGHT: 65 IN | BODY MASS INDEX: 21.52 KG/M2 | SYSTOLIC BLOOD PRESSURE: 112 MMHG | WEIGHT: 129.19 LBS | HEART RATE: 74 BPM | DIASTOLIC BLOOD PRESSURE: 76 MMHG | OXYGEN SATURATION: 100 % | RESPIRATION RATE: 18 BRPM | TEMPERATURE: 98 F

## 2025-03-04 LAB
ALBUMIN SERPL BCG-MCNC: 1.6 G/DL (ref 3.5–5.2)
ALP SERPL-CCNC: 48 U/L (ref 40–150)
ALT SERPL W P-5'-P-CCNC: 6 U/L (ref 0–50)
ANION GAP SERPL CALCULATED.3IONS-SCNC: 18 MMOL/L (ref 7–15)
ANION GAP SERPL CALCULATED.3IONS-SCNC: 9 MMOL/L (ref 7–15)
AST SERPL W P-5'-P-CCNC: 8 U/L (ref 0–45)
BILIRUB SERPL-MCNC: <0.2 MG/DL
BUN SERPL-MCNC: 5.7 MG/DL (ref 6–20)
BUN SERPL-MCNC: 8.9 MG/DL (ref 6–20)
CALCIUM SERPL-MCNC: 7.4 MG/DL (ref 8.8–10.4)
CALCIUM SERPL-MCNC: 8.7 MG/DL (ref 8.8–10.4)
CHLORIDE SERPL-SCNC: 101 MMOL/L (ref 98–107)
CHLORIDE SERPL-SCNC: 103 MMOL/L (ref 98–107)
CREAT SERPL-MCNC: 0.4 MG/DL (ref 0.51–0.95)
CREAT SERPL-MCNC: 0.74 MG/DL (ref 0.51–0.95)
EGFRCR SERPLBLD CKD-EPI 2021: >90 ML/MIN/1.73M2
EGFRCR SERPLBLD CKD-EPI 2021: >90 ML/MIN/1.73M2
ERCP: NORMAL
ERYTHROCYTE [DISTWIDTH] IN BLOOD BY AUTOMATED COUNT: 14.1 % (ref 10–15)
ERYTHROCYTE [DISTWIDTH] IN BLOOD BY AUTOMATED COUNT: 14.1 % (ref 10–15)
GLUCOSE SERPL-MCNC: 148 MG/DL (ref 70–99)
GLUCOSE SERPL-MCNC: 55 MG/DL (ref 70–99)
HCO3 SERPL-SCNC: 14 MMOL/L (ref 22–29)
HCO3 SERPL-SCNC: 26 MMOL/L (ref 22–29)
HCT VFR BLD AUTO: 22.5 % (ref 35–47)
HCT VFR BLD AUTO: 32.3 % (ref 35–47)
HGB BLD-MCNC: 10 G/DL (ref 11.7–15.7)
HGB BLD-MCNC: 7.2 G/DL (ref 11.7–15.7)
MCH RBC QN AUTO: 27.8 PG (ref 26.5–33)
MCH RBC QN AUTO: 27.8 PG (ref 26.5–33)
MCHC RBC AUTO-ENTMCNC: 31 G/DL (ref 31.5–36.5)
MCHC RBC AUTO-ENTMCNC: 32 G/DL (ref 31.5–36.5)
MCV RBC AUTO: 87 FL (ref 78–100)
MCV RBC AUTO: 90 FL (ref 78–100)
PLATELET # BLD AUTO: 258 10E3/UL (ref 150–450)
PLATELET # BLD AUTO: 343 10E3/UL (ref 150–450)
POTASSIUM SERPL-SCNC: 3.8 MMOL/L (ref 3.4–5.3)
POTASSIUM SERPL-SCNC: 4.1 MMOL/L (ref 3.4–5.3)
PROT SERPL-MCNC: 3 G/DL (ref 6.4–8.3)
RBC # BLD AUTO: 2.59 10E6/UL (ref 3.8–5.2)
RBC # BLD AUTO: 3.6 10E6/UL (ref 3.8–5.2)
SODIUM SERPL-SCNC: 135 MMOL/L (ref 135–145)
SODIUM SERPL-SCNC: 136 MMOL/L (ref 135–145)
WBC # BLD AUTO: 6 10E3/UL (ref 4–11)
WBC # BLD AUTO: 9 10E3/UL (ref 4–11)

## 2025-03-04 PROCEDURE — G0378 HOSPITAL OBSERVATION PER HR: HCPCS

## 2025-03-04 PROCEDURE — 82310 ASSAY OF CALCIUM: CPT | Performed by: PHYSICIAN ASSISTANT

## 2025-03-04 PROCEDURE — 99239 HOSP IP/OBS DSCHRG MGMT >30: CPT | Performed by: STUDENT IN AN ORGANIZED HEALTH CARE EDUCATION/TRAINING PROGRAM

## 2025-03-04 PROCEDURE — 82565 ASSAY OF CREATININE: CPT | Performed by: STUDENT IN AN ORGANIZED HEALTH CARE EDUCATION/TRAINING PROGRAM

## 2025-03-04 PROCEDURE — 258N000003 HC RX IP 258 OP 636: Performed by: PHYSICIAN ASSISTANT

## 2025-03-04 PROCEDURE — 99232 SBSQ HOSP IP/OBS MODERATE 35: CPT | Mod: GC | Performed by: INTERNAL MEDICINE

## 2025-03-04 PROCEDURE — 250N000011 HC RX IP 250 OP 636: Performed by: PHYSICIAN ASSISTANT

## 2025-03-04 PROCEDURE — 36415 COLL VENOUS BLD VENIPUNCTURE: CPT | Performed by: PHYSICIAN ASSISTANT

## 2025-03-04 PROCEDURE — 82040 ASSAY OF SERUM ALBUMIN: CPT | Performed by: PHYSICIAN ASSISTANT

## 2025-03-04 PROCEDURE — 85027 COMPLETE CBC AUTOMATED: CPT | Performed by: PHYSICIAN ASSISTANT

## 2025-03-04 PROCEDURE — 250N000013 HC RX MED GY IP 250 OP 250 PS 637: Performed by: PHYSICIAN ASSISTANT

## 2025-03-04 PROCEDURE — 82435 ASSAY OF BLOOD CHLORIDE: CPT | Performed by: STUDENT IN AN ORGANIZED HEALTH CARE EDUCATION/TRAINING PROGRAM

## 2025-03-04 PROCEDURE — 85014 HEMATOCRIT: CPT | Performed by: STUDENT IN AN ORGANIZED HEALTH CARE EDUCATION/TRAINING PROGRAM

## 2025-03-04 PROCEDURE — 36415 COLL VENOUS BLD VENIPUNCTURE: CPT | Performed by: STUDENT IN AN ORGANIZED HEALTH CARE EDUCATION/TRAINING PROGRAM

## 2025-03-04 RX ORDER — NALOXONE HYDROCHLORIDE 0.4 MG/ML
0.2 INJECTION, SOLUTION INTRAMUSCULAR; INTRAVENOUS; SUBCUTANEOUS
Status: DISCONTINUED | OUTPATIENT
Start: 2025-03-04 | End: 2025-03-04

## 2025-03-04 RX ORDER — FLUMAZENIL 0.1 MG/ML
0.2 INJECTION, SOLUTION INTRAVENOUS
Status: DISCONTINUED | OUTPATIENT
Start: 2025-03-04 | End: 2025-03-04 | Stop reason: HOSPADM

## 2025-03-04 RX ORDER — LEVOFLOXACIN 500 MG/1
500 TABLET, FILM COATED ORAL DAILY
Qty: 4 TABLET | Refills: 0 | Status: SHIPPED | OUTPATIENT
Start: 2025-03-05 | End: 2025-03-09

## 2025-03-04 RX ORDER — NALOXONE HYDROCHLORIDE 0.4 MG/ML
0.4 INJECTION, SOLUTION INTRAMUSCULAR; INTRAVENOUS; SUBCUTANEOUS
Status: DISCONTINUED | OUTPATIENT
Start: 2025-03-04 | End: 2025-03-04

## 2025-03-04 RX ORDER — ONDANSETRON 4 MG/1
4 TABLET, ORALLY DISINTEGRATING ORAL EVERY 6 HOURS PRN
Status: DISCONTINUED | OUTPATIENT
Start: 2025-03-04 | End: 2025-03-04 | Stop reason: HOSPADM

## 2025-03-04 RX ORDER — OXYCODONE HYDROCHLORIDE 5 MG/1
5 TABLET ORAL EVERY 4 HOURS PRN
Qty: 10 TABLET | Refills: 0 | Status: SHIPPED | OUTPATIENT
Start: 2025-03-04

## 2025-03-04 RX ORDER — ONDANSETRON 2 MG/ML
4 INJECTION INTRAMUSCULAR; INTRAVENOUS EVERY 6 HOURS PRN
Status: DISCONTINUED | OUTPATIENT
Start: 2025-03-04 | End: 2025-03-04 | Stop reason: HOSPADM

## 2025-03-04 RX ORDER — PROCHLORPERAZINE MALEATE 5 MG/1
10 TABLET ORAL EVERY 6 HOURS PRN
Status: DISCONTINUED | OUTPATIENT
Start: 2025-03-04 | End: 2025-03-04 | Stop reason: HOSPADM

## 2025-03-04 RX ADMIN — NICOTINE 1 PATCH: 7 PATCH, EXTENDED RELEASE TRANSDERMAL at 08:00

## 2025-03-04 RX ADMIN — OXYCODONE HYDROCHLORIDE 5 MG: 5 TABLET ORAL at 00:45

## 2025-03-04 RX ADMIN — OXYCODONE HYDROCHLORIDE 5 MG: 5 TABLET ORAL at 12:47

## 2025-03-04 RX ADMIN — ACETAMINOPHEN 650 MG: 325 TABLET, FILM COATED ORAL at 12:47

## 2025-03-04 RX ADMIN — ONDANSETRON 4 MG: 2 INJECTION, SOLUTION INTRAMUSCULAR; INTRAVENOUS at 11:33

## 2025-03-04 RX ADMIN — SODIUM CHLORIDE, POTASSIUM CHLORIDE, SODIUM LACTATE AND CALCIUM CHLORIDE: 600; 310; 30; 20 INJECTION, SOLUTION INTRAVENOUS at 08:01

## 2025-03-04 RX ADMIN — LEVOFLOXACIN 500 MG: 500 TABLET, FILM COATED ORAL at 12:05

## 2025-03-04 RX ADMIN — PANTOPRAZOLE SODIUM 40 MG: 40 TABLET, DELAYED RELEASE ORAL at 08:00

## 2025-03-04 ASSESSMENT — ACTIVITIES OF DAILY LIVING (ADL)
ADLS_ACUITY_SCORE: 50
ADLS_ACUITY_SCORE: 47
ADLS_ACUITY_SCORE: 47
ADLS_ACUITY_SCORE: 50
ADLS_ACUITY_SCORE: 47
ADLS_ACUITY_SCORE: 50
ADLS_ACUITY_SCORE: 50

## 2025-03-04 NOTE — DISCHARGE SUMMARY
[facility]  Hospitalist Discharge Summary      Date of Admission:  3/3/2025  Date of Discharge:  3/4/2025  3:54 PM  Discharging Provider: Paula Castillo MD  Discharge Service: Hospitalist Service, GOLD TEAM 12    Discharge Diagnoses   Large Pancreatic Pseudocyst s/p EUS w/ Fine Needle Aspiration and Cystgastrostomy (3/3/25)   Infected pancreatic pseudocyst  Pancreatic Duct Stricture s/p ERCP w/ Pancreatic Stent Exchange, Biliary Stent Removal and Sludge Removal (3/3/25)  Hx Chronic Calcific Pancreatitis    Chronic:  - history of chronic calcified pancreatitis due to prior heavy alcohol use c/b pseudoaneurysms of GDA and 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 (1/19/24), large pseudocyst s/p FNA drainage (2/3/25 and recently s/p cystogastrostomy with AXIOS and coaxial stents placement (3/3/25)      Clinically Significant Risk Factors          Follow-ups Needed After Discharge   Follow-up Appointments       ADULT [facility] Specialty Follow-up and recommended labs and tests      Follow up: GI will arrange for repeat endoscopic procedure outpatient      Appointments on [place] and/or [facility] (with [facility] or [facility] provider or service). Call [phone] if you haven't heard regarding these appointments within 7 days of discharge.                Unresulted Labs Ordered in the Past 30 Days of this Admission       Date and Time Order Name Status Description    3/3/2025  1:16 PM Fine Needle Aspiration Aerobic Bacterial Culture Routine Preliminary     3/3/2025  1:16 PM Anaerobic Bacterial Culture Routine Preliminary         These results will be followed up by GI and hospitalist pool.     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 On 2/3/25 patient underwent EUS w/ fine needle aspiration of pseudocyst and ERCP w/ ventral duct cannulation, pancreatic sphincterotomy, placement 5 Fr by 10cm single pigtailed Zimmon stent in PD, biliary sphincterotomy c/b sphinctertomy bleeding s/p 8mm by 6cm Viabil covered metal biliary stent w/ side holes placed into common bile duct to tampnade and prevent further bleeding. She was admitted to [facility] for acute on chronic pancreatitis 2/22/25-2/24/25 managed with NPO and IVF, discharged home. She presented to [facility] 3/3/25 for scheduled EUS w/ fine needle aspiration and cystgastrostomy stent placement of pancreatic pseudocyst, and ERCP w/ pancreatic stent exchange, biliary stent removal and sludge removal. Subsequently admitted to medicine service to monitor for bleeding after procedure.     Large Pancreatic Pseudocyst s/p EUS w/ Fine Needle Aspiration and Cystgastrostomy (3/3/25)  Infected pancreatic pseudocyst  Pancreatic Duct Stricture s/p ERCP w/ Pancreatic Stent Exchange, Biliary Stent Removal and Sludge Removal (3/3/25)  Hx Chronic Calcific Pancreatitis  On EUS she underwent fine needle aspiration of the fluid with removal of 15-20 ml of purulent discharge. Cystogastrostomy using 15 x 10 mm AXIOS stent over a wire was performed. The distal flange of the AXIOS did not fully open and coaxial 10 x 60 mm Viabil FCMS and 7 Fr x 15 cm ZIMMON double pigtailed stent were placed. Large amount of pus and purulent drainage was noted through the AXIOS, in addition to bleeding that was treated with topical epinephrine. CT A/P w post-op prelim with no evidence of perforation, did re-demonstrate large pancreatic pseudocyst  (extending from the pancreatic tail to the left kidney w/ significant mass effect on the left kidney); notable air within the gallbladder and along the portal vein, likely iatrogenic post-op. Tolerated diet prior to discharge. Pain tolerable with oral pain meds. Cultures prelim 1+ yeast thought to be contaminant. Hgb 11 on admission, erroneously 7.2 the next morning and recheck without intervention was 10.  - f/up with GI outpatient for repeat endoscopic procedure  - F/up cultures (aerobic, anaerobic). Prelim with yeast, thought to be contaminant.   - levofloxacin 500 mg daily x 5 days (3/4 - 3/8)  - regular diet  - oxycodone 5 mg q4H PRN x 10 tabs    Consultations This Hospital Stay   GI PANCREATICOBILIARY ADULT IP CONSULT  NURSING TO CONSULT FOR VASCULAR ACCESS CARE IP CONSULT    Code Status   Prior    Time Spent on this Encounter   IJase MD, personally saw the patient today and spent greater than 30 minutes discharging this patient.       Jase Khan ([facility]) MD Jonathan  Internal Medicine/Pediatrics  [facility]    [facility] [address]  [address] [address]  Phone: [phone]  Fax: [phone]  ______________________________________________________________________    Physical Exam   Vital Signs: Temp: 98  F (36.7  C) Temp src: Oral BP: 112/76 Pulse: 74   Resp: 18 SpO2: 100 % O2 Device: None (Room air)    Weight: 129 lbs 3.03 oz    General: awake, alert, in no acute distress  HEENT: NCAT, sclera anicteric, no nasal discharge, MMM  CV: RRR, no murmurs noted  Resp: CTAB, no increased WOB  Abd: Soft, nontender, nondistended, +BS, no rebound or guarding  MSK: No peripheral edema, extremities warm and well perfused  Skin: warm, dry  Neuro: Alert and oriented x4.         Primary Care Physician   Dodie Lofton    Discharge Orders      Reason for your hospital stay    Admitted for monitoring after your procedure     Activity    Your activity upon discharge:  activity as tolerated. Do not drive while on pain medications.     ADULT [facility] Specialty Follow-up and recommended labs and tests    Follow up: GI will arrange for repeat endoscopic procedure outpatient      Appointments on [place] and/or [facility] (with [facility] or [facility] provider or service). Call [phone] if you haven't heard regarding these appointments within 7 days of discharge.     When to contact your care team    Call your primary doctor if you have any of the following: fevers, chills, nausea, vomiting, worsening pain, or any other concerning symptoms.     Discharge Instructions    1. Oxycodone as needed for pain for short term  2. Ok to advance to regular diet as tolerated  3. Levofloxacin for total 5 days (your first dose was today, so next dose due tomorrow)  4. GI will arrange for repeat endoscopic procedure outpt     Diet    Follow this diet upon discharge: Current Diet:Orders Placed This Encounter      Regular Diet Adult       Significant Results and Procedures   Most Recent 3 CBC's:  Recent Labs   Lab Test 03/04/25  0942 03/04/25  0609 03/03/25  1117   WBC 9.0 6.0 7.1   HGB 10.0* 7.2* 11.0*   MCV 90 87 86    258 377     Most Recent 3 BMP's:  Recent Labs   Lab Test 03/04/25  0942 03/04/25  0609 03/03/25  1117    135 140   POTASSIUM 3.8 4.1 4.5   CHLORIDE 101 103 103   CO2 26 14* 27   BUN 8.9 5.7* 14.9   CR 0.74 0.40* 0.83   ANIONGAP 9 18* 10   DUSTIN 8.7* 7.4* 9.5   * 55* 103*     Most Recent 2 LFT's:  Recent Labs   Lab Test 03/04/25  0609 03/03/25  1117   AST 8 21   ALT 6 20   ALKPHOS 48 130   BILITOTAL <0.2 0.2   ,   Results for orders placed or performed during the hospital encounter of 03/03/25   XR Surgery CHARLENE G/T 5 Min Fluoro w Stills    Narrative    This exam was marked as non-reportable because it will not be read by a   radiologist or a [place] non-radiologist provider.         CT Abdomen Pelvis w Contrast    Narrative    EXAMINATION: CT ABDOMEN PELVIS W  CONTRAST, 3/3/2025 7:19 PM    INDICATION: Pancreatic pseudocyst s/p cystogastrostomy tubes placment.  Please evaluate for stent migration, perforation or leak    COMPARISON STUDY: 1/31/2025    TECHNIQUE: CT scan of the abdomen and pelvis was performed on  multidetector CT scanner using volumetric acquisition technique and  images were reconstructed in multiple planes with variable thickness  and reviewed on dedicated workstations.     CONTRAST: [id number]: 80mL injected IV without oral contrast    CT scan radiation dose is optimized to minimum requisite dose using  automated dose modulation techniques.    FINDINGS:    Lower thorax: Bibasilar atelectasis.    Liver: No mass. Periportal air, likely iatrogenic. No intrahepatic  biliary ductal dilation.    Biliary System: Air within the gallbladder, likely iatrogenic. No  extrahepatic biliary ductal dilation.    Pancreas: Pancreatic duct stent. Pancreatic tail pseudocyst with  complex fluid within and wall thickening extends to the left renal  capsule now measuring 6.7 x 4.3 cm. Cystogastrostomy tube is in place.  Similar compression of the left kidney. Prominent adjacent  retroperitoneal lymph nodes seen on series 4 image 145. No mass or  pancreatic ductal dilation.    Adrenal glands: No mass or nodules    Spleen: Normal.    Kidneys: No suspicious mass, obstructing calculus or hydronephrosis.    Gastrointestinal tract : Loculated fluid collection adjacent to the  stomach and pancreas seen on series 4 image 114 measuring 2.3 x 1.7 cm  Normal appendix. Normal caliber small bowel.    Mesentery/peritoneum/retroperitoneum: No mass. No free fluid or air.    Lymph nodes: No significant lymphadenopathy.    Vasculature: Patent major abdominal vasculature.    Pelvis: Urinary bladder is normal.    Osseous structures: No aggressive or acute osseous lesion.      Soft tissues: Within normal limits.      Impression    IMPRESSION:   1. Redemonstration of pancreatic pseudocysts along the  stomach and  large pseudocyst extending from the pancreatic tail to the left  kidney. Significant mass effect on the left kidney is unchanged.  2. Air within the gallbladder and along the portal vein, likely  iatrogenic.   3. Cystogastrostomy tube in place.    I have personally reviewed the examination and initial interpretation  and I agree with the findings.    NEHA PENDLETON MD         SYSTEM ID:  [id number]       Discharge Medications   Discharge Medication List as of 3/4/2025  2:03 PM        START taking these medications    Details   oxyCODONE (ROXICODONE) 5 MG tablet Take 1 tablet (5 mg) by mouth every 4 hours as needed for moderate pain., Disp-10 tablet, R-0, E-Prescribe           CONTINUE these medications which have CHANGED    Details   levofloxacin (LEVAQUIN) 500 MG tablet Take 1 tablet (500 mg) by mouth daily for 4 days., Disp-4 tablet, R-0, E-Prescribe           CONTINUE these medications which have NOT CHANGED    Details   !! lisdexamfetamine (VYVANSE) 10 MG capsule Take 1 capsule (10 mg) by mouth daily (with lunch)., Disp-30 capsule, R-0, E-Prescribe      !! lisdexamfetamine (VYVANSE) 20 MG capsule Take 1 capsule (20 mg) by mouth every morning., Disp-30 capsule, R-0, E-Prescribe      omeprazole (PRILOSEC) 20 MG DR capsule Take 1 capsule (20 mg) by mouth daily., Disp-90 capsule, R-3, E-Prescribe      ondansetron (ZOFRAN ODT) 4 MG ODT tab Place 4 mg under the tongue., Historical      !! lisdexamfetamine (VYVANSE) 10 MG capsule Take 1 capsule (10 mg) by mouth daily., Disp-30 capsule, R-0, E-Prescribe      !! lisdexamfetamine (VYVANSE) 10 MG capsule Take 1 capsule (10 mg) by mouth daily., Disp-30 capsule, R-0, E-Prescribe      !! lisdexamfetamine (VYVANSE) 20 MG capsule Take 1 capsule (20 mg) by mouth daily (with lunch)., Disp-30 capsule, R-0, E-Prescribe      !! lisdexamfetamine (VYVANSE) 20 MG capsule Take 1 capsule (20 mg) by mouth daily (with lunch)., Disp-30 capsule, R-0, E-Prescribe       multivitamin w/minerals (MULTI-VITAMIN) tablet Take 1 tablet by mouth daily, Historical       !! - Potential duplicate medications found. Please discuss with provider.        Allergies   Allergies   Allergen Reactions    Bactrim [Sulfamethoxazole-Trimethoprim] Rash

## 2025-03-04 NOTE — CONSULTS
GASTROENTEROLOGY CONSULTATION      Date of Admission:  3/3/2025  Requesting physician: Dayton Harding PA-C              Reason for Consultation:   S/p ERCP w/ pancreatic duct stent exchange and pseudocyst cystgastrostomy         ASSESSMENT AND RECOMMENDATIONS:   Assessment:  Rani Lisa is a 29 year old female with chronic pancreatitis with pseudocyst who was admitted on 3/3 for observation after EUS/ERCP . 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 During EUS/ERCP she was noted to have bleeding therefore admitted for observation.       # Chronic calcified pancreatitis with symptomatic pseudocyst  Follows with Dr. Ortiz for pseudocyst management.  She underwent first US with FNA drainage of the pseudocyst.  Yesterday she underwent repeat EUS with cystogastrostomy with placement of Axios and coaxial stents to drain the pseudocyst. Prelim culture has 1+ yeast likely contamination from the GI tract.       # Acute blood loss anemia, likely mild   Bleeding was noted through AXIOS during th EUS procedure. Hgb was initial 7 from 11 and recheck this morning was 10. Her vitals are normal and stable. Therefore the initial hgb of 7 likely erroneous.       Recommendations  - ok to discharge from GI perspective  - complete Levofloxacin 500mg daily for 5 days   - GI will arrange for repeat endoscopic procedure outpt       Thank you for involving us in this patient's care. Please do not hesitate to contact the GI service with any questions or concerns.     Pt care plan discussed with Dr. Valdez, GI staff physician.      Brissa Urias MD PhD  GI Fellow   [phone]    -------------------------------------------------------------------------------------------------------------------           History of Present Illness:   Rani Lisa is a 29 year old female with chronic pancreatitis with pseudocyst who was admitted on 3/3 for observation after EUS/ERCP . 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 During EUS/ERCP she was noted to have bleeding therefore admitted for observation.     This morning, she reports that pain is manageable.  She had no need of IV pain meds since midnight.  She tolerated clear liquid diet and then regular diet.             Past Medical History:   Reviewed and edited as appropriate  Past Medical History:   Diagnosis Date    Abdominal fluid collection     ADHD (attention deficit hyperactivity disorder)     Alcohol dependence (H)     Alcoholic pancreatitis     Depression     Hyperglycemia     Hypomagnesemia     Hyponatremia     Leukocytosis     Migraine     Nexplanon insertion     Pancreatic duct stricture             Past Surgical History:   Reviewed and edited as appropriate   Past Surgical History:   Procedure Laterality Date    COLPOSCOPY      ENDOSCOPIC RETROGRADE CHOLANGIOPANCREATOGRAM N/A 2/3/2025    Procedure: ENDOSCOPIC RETROGRADE CHOLANGIOPANCREATOGRAPHY WITH BILIARY SPHINCTEROTOMY AND STENT PLACEMENT, PANCREATIC DUCT SPHINCTEROTOMY, STONE REMOVAL, STENT PLACEMENT;  Surgeon: Guru Elena Ortiz MD;  Location: [facility] OR    ESOPHAGOSCOPY, GASTROSCOPY, DUODENOSCOPY (EGD), COMBINED N/A 2/3/2025    Procedure: ESOPHAGOGASTRODUODENOSCOPY, WITH FINE NEEDLE ASPIRATION BIOPSY, WITH ENDOSCOPIC ULTRASOUND GUIDANCE;  Surgeon: Guru Elena Ortiz MD;  Location: [facility] OR    IR  VISCERAL ANGIOGRAM  01/19/2024    TONSILLECTOMY & ADENOIDECTOMY              Social History:   Reviewed and edited as appropriate  Social History     Socioeconomic History    Marital status:      Spouse name: Not on file    Number of children: Not on file    Years of education: Not on file    Highest education level: Not on file   Occupational History    Not on file   Tobacco Use    Smoking status: Every Day     Current packs/day: 0.50     Types: Cigarettes    Smokeless tobacco: Never   Vaping Use    Vaping status: Every Day    Substances: Nicotine, Flavoring    Devices: Disposable   Substance and Sexual Activity    Alcohol use: Not Currently    Drug use: Yes     Types: Marijuana     Comment: daily marijuana smoking    Sexual activity: Not on file   Other Topics Concern    Not on file   Social History Narrative    Not on file     Social Drivers of Health     Financial Resource Strain: Low Risk  (4/10/2024)    Received from [facility]    Financial Resource Strain     Difficulty of Paying Living Expenses: 3     Difficulty of Paying Living Expenses: Not on file   Food Insecurity: No Food Insecurity (2/23/2025)    Received from [facility]    Food Insecurity     Do you worry your food will run out before you are able to buy more?: 1   Transportation Needs: No Transportation Needs (2/23/2025)    Received from [facility]    Transportation Needs     Does lack of transportation keep you from medical appointments?: 1     Does lack of transportation keep you from work, meetings or getting things that you need?: 1   Physical Activity: Not on file   Stress: Not on file   Social Connections: Socially Integrated (2/23/2025)    Received from [facility]    Social Connections     Do you often feel lonely or isolated from those around you?: 0   Interpersonal Safety: Low Risk  (3/3/2025)     Interpersonal Safety     Do you feel physically and emotionally safe where you currently live?: Yes     Within the past 12 months, have you been hit, slapped, kicked or otherwise physically hurt by someone?: No     Within the past 12 months, have you been humiliated or emotionally abused in other ways by your partner or ex-partner?: No   Housing Stability: Low Risk  (2/23/2025)    Received from [facility]    Housing Stability     What is your housing situation today?: 1            Family History:   Reviewed and edited as appropriate  Family History   Problem Relation Age of Onset    Thrombosis Paternal Grandmother     Thrombosis Paternal Grandfather     Diabetes Other     Anesthesia Reaction No family hx of     Bleeding Disorder No family hx of               Allergies:   Reviewed and edited as appropriate     Allergies   Allergen Reactions    Bactrim [Sulfamethoxazole-Trimethoprim] Rash            Medications:     Medications Prior to Admission   Medication Sig Dispense Refill Last Dose/Taking    levofloxacin (LEVAQUIN) 500 MG tablet Take 1 tablet (500 mg) by mouth daily. 7 tablet 0 More than a month    lisdexamfetamine (VYVANSE) 10 MG capsule Take 1 capsule (10 mg) by mouth daily (with lunch). 30 capsule 0 3/2/2025 at 12:00 PM    lisdexamfetamine (VYVANSE) 20 MG capsule Take 1 capsule (20 mg) by mouth every morning. 30 capsule 0 3/2/2025 at  9:00 AM    omeprazole (PRILOSEC) 20 MG DR capsule Take 1 capsule (20 mg) by mouth daily. 90 capsule 3 3/3/2025    ondansetron (ZOFRAN ODT) 4 MG ODT tab Place 4 mg under the tongue.   3/3/2025 at  9:00 AM    lisdexamfetamine (VYVANSE) 10 MG capsule Take 1 capsule (10 mg) by mouth daily. 30 capsule 0     [START ON 3/16/2025] lisdexamfetamine (VYVANSE) 10 MG capsule Take 1 capsule (10 mg) by mouth daily. 30 capsule 0     lisdexamfetamine (VYVANSE) 20 MG capsule Take 1 capsule (20 mg) by mouth daily (with lunch). 30 capsule 0     [START ON  "3/14/2025] lisdexamfetamine (VYVANSE) 20 MG capsule Take 1 capsule (20 mg) by mouth daily (with lunch). 30 capsule 0     multivitamin w/minerals (MULTI-VITAMIN) tablet Take 1 tablet by mouth daily   2/28/2025             Review of Systems:     A complete 10 point review of systems was performed and is negative except as noted in the HPI           Physical Exam:   BP 98/63   Pulse 78   Temp 98.5  F (36.9  C) (Oral)   Resp 16   Ht 1.651 m (5' 5\")   Wt 58.6 kg (129 lb 3 oz)   LMP 02/03/2025   SpO2 97%   BMI 21.50 kg/m    Wt:   Wt Readings from Last 2 Encounters:   03/03/25 58.6 kg (129 lb 3 oz)   02/03/25 61.6 kg (135 lb 12.9 oz)      Constitutional: No acute distress, resting comfortably in bed  Eyes: Sclera anicteric  Ears/nose/mouth/throat: Moist mucus membranes, hearing intact  Neck: supple  CV: No edema  Respiratory: Breathing comfortably on room air  Abd: Soft, nontender, nondistended, bowel sounds present  Skin: warm, perfused, no jaundice  Neuro: AAO x 3  Psych: Normal affect  MSK: No gross deformities         Data:   Labs and imaging below were independently reviewed and interpreted    BMP  Recent Labs   Lab 03/04/25  0609 03/03/25  1117    140   POTASSIUM 4.1 4.5   CHLORIDE 103 103   DUSTIN 7.4* 9.5   CO2 14* 27   BUN 5.7* 14.9   CR 0.40* 0.83   GLC 55* 103*     CBC  Recent Labs   Lab 03/04/25  0609 03/03/25  1117   WBC 6.0 7.1   RBC 2.59* 3.88   HGB 7.2* 11.0*   HCT 22.5* 33.4*   MCV 87 86   MCH 27.8 28.4   MCHC 32.0 32.9   RDW 14.1 14.2    377     INR  Recent Labs   Lab 03/03/25  1117   INR 1.03     LFTs  Recent Labs   Lab 03/04/25  0609 03/03/25  1117   ALKPHOS 48 130   AST 8 21   ALT 6 20   BILITOTAL <0.2 0.2   PROTTOTAL 3.0* 7.7   ALBUMIN 1.6* 3.8      PANC  Recent Labs   Lab 03/03/25  1117   LIPASE 85*       Imaging: Reviewed    Endoscopy: Reviewed   "

## 2025-03-04 NOTE — PLAN OF CARE
Goal Outcome Evaluation:      Plan of Care Reviewed With: patient    Overall Patient Progress: no changeOverall Patient Progress: no change    Outcome Evaluation: Px alert and oriented x4, able to make needs known. sleepy, allowed to rest. Provider came and advanced px's diet to regular. According to provider, if px tolerates full liquid diet and hgb will increase, she might get discharge this afternoon. Px complained of nausea while eating lunch, PRN zofran IV given ad was effective. Going to OBS Rm. 16, report given to ERIN Foster, px aware. Out of the unit via wheelchair with belongings. C/o abdominal discomfort before moving to OBS, warm packs offered and was accepted. Continue with POC.

## 2025-03-04 NOTE — OR NURSING
Patient transferred to phase 2 at 1620, patient complains increased pain after took a sip of water. Pain rate 7/10. Pain meds given, Dr. Ortiz and Dr. Langley notified. Patient will admit to obs due to patient's condition. CT scan ordered.

## 2025-03-04 NOTE — PROGRESS NOTES
Paged by medicine that CT is done. I do not see new findings to explain pain. No acute findings on prelim read. No change in plan at this time.  - Follow up formal read    Jayme Le  Gastroenterology Fellow, PGY4

## 2025-03-04 NOTE — PLAN OF CARE
"BP 98/63   Pulse 78   Temp 98.5  F (36.9  C) (Oral)   Resp 16   Ht 1.651 m (5' 5\")   Wt 58.6 kg (129 lb 3 oz)   LMP 02/03/2025   SpO2 97%   BMI 21.50 kg/m       Goal Outcome Evaluation:  [id number]      Plan of Care Reviewed With: patient    Overall Patient Progress: no changeOverall Patient Progress: no change     VSS, on RA. A&Ox4. Ind. Clears diet. Denies N/V/SOB. Pain tolerated w/ PRN IV Dilaudid, PRN Oxy, & PRN Tylenol. Voiding spont. LBM PTA, passing gas. PIV infusing  ml/hr. Cont POC.       "

## 2025-03-04 NOTE — PROGRESS NOTES
Patient's After Visit Summary was reviewed with patient    Patient verbalized understanding of After Visit Summary, recommended follow up and was given an opportunity to ask questions.     Discharge medications sent home with patient/family: YES     Discharged with spouse. All belongings sent with patient. Patient stable at time of discharge.

## 2025-03-05 ENCOUNTER — PREP FOR PROCEDURE (OUTPATIENT)
Dept: GASTROENTEROLOGY | Facility: CLINIC | Age: 30
End: 2025-03-05
Payer: COMMERCIAL

## 2025-03-05 ENCOUNTER — PATIENT OUTREACH (OUTPATIENT)
Dept: CARE COORDINATION | Facility: CLINIC | Age: 30
End: 2025-03-05
Payer: COMMERCIAL

## 2025-03-05 ENCOUNTER — PATIENT OUTREACH (OUTPATIENT)
Dept: GASTROENTEROLOGY | Facility: CLINIC | Age: 30
End: 2025-03-05
Payer: COMMERCIAL

## 2025-03-05 DIAGNOSIS — Z46.89 ENCOUNTER FOR REPLACEMENT OF BILIARY STENT: ICD-10-CM

## 2025-03-05 DIAGNOSIS — K85.91 NECROTIZING PANCREATITIS: Primary | ICD-10-CM

## 2025-03-05 LAB
BACTERIA FLD CULT: ABNORMAL
BACTERIA FLD CULT: ABNORMAL

## 2025-03-05 NOTE — PROGRESS NOTES
Post ERCP on 3/3/25 with Dr. Ortiz.      Follow-up recommendations:        - Repeat EGD/ERCP in 4-6 weeks for stent                          removal/exhcange, in addition to possible necrosectomy    Verbally, per Dr. Ortiz: Please arrange procedure on 3/12/24.   Epic message 3/4/25: Just to put this on your radar for next wednesday (3/12/25)    Orders placed:   Please assist in scheduling:     Procedure/Imaging/Clinic: Esophagogastroduodenoscopy (EGD) with removal of necrotic tissue and Endoscopic Retrograde Cholangiopancreatography (ERCP) for stent exchange  Physician: Angel  Timing: 3/12/25  Scope time: Provider average   Anesthesia: General  Dx: necrotizing pancreatitis; replacement of biliary stent  Tier:2  Location: [facility]  OK to schedule while attending: Not specified by provider   Patient communication letter header:Esophagogastroduodenoscopy (EGD) and Endoscopic Retrograde Cholangiopancreatography (ERCP)    Procedure date offered: 3/12/25; message sent to patient via [employer] to communicate.    Preop Plan: H&P 3/3/25    Med Review    Blood thinner -  None  ASA - None  Diabetic - None   Injectable or oral medications for weight loss - None    Patient Education r/t procedure: Adam Davison RN Care Coordinator

## 2025-03-05 NOTE — PROGRESS NOTES
General acute hospital    Background: Transitional Care Management program identified per system criteria and reviewed by [facility] Resource [place] team for possible outreach.    Assessment: Upon chart review, [facility] Team member will not proceed with patient outreach related to this episode of Transitional Care Management program due to reason below:    Patient has a follow up appointment with an appropriate provider today for hospital discharge    Plan: Transitional Care Management episode addressed appropriately per reason noted above.      Carolyn Harris MA  [facility] [place]    *Connected Care Resource Team does NOT follow patient ongoing. Referrals are identified based on internal discharge reports and the outreach is to ensure patient has an understanding of their discharge instructions.

## 2025-03-05 NOTE — PROGRESS NOTES
Please assist in scheduling:     Procedure/Imaging/Clinic: Esophagogastroduodenoscopy (EGD) with removal of necrotic tissue and Endoscopic Retrograde Cholangiopancreatography (ERCP) for stent exchange  Physician: Angel  Timing: 3/12/25  Scope time: Provider average   Anesthesia: General  Dx: necrotizing pancreatitis; replacement of biliary stent  Tier:2  Location: [facility]  OK to schedule while attending: Not specified by provider   Patient communication letter header:Esophagogastroduodenoscopy (EGD) and Endoscopic Retrograde Cholangiopancreatography (ERCP)

## 2025-03-06 LAB
BACTERIA FLD CULT: NORMAL
UPPER EUS: NORMAL

## 2025-03-10 LAB
BACTERIA FLD CULT: ABNORMAL
BACTERIA FLD CULT: ABNORMAL

## 2025-03-11 ENCOUNTER — MYC REFILL (OUTPATIENT)
Dept: FAMILY MEDICINE | Facility: CLINIC | Age: 30
End: 2025-03-11
Payer: COMMERCIAL

## 2025-03-11 ENCOUNTER — ANESTHESIA EVENT (OUTPATIENT)
Dept: SURGERY | Facility: CLINIC | Age: 30
End: 2025-03-11
Payer: COMMERCIAL

## 2025-03-11 DIAGNOSIS — K86.3 PSEUDOCYST OF PANCREAS: Primary | ICD-10-CM

## 2025-03-11 NOTE — ANESTHESIA PREPROCEDURE EVALUATION
Anesthesia Pre-Procedure Evaluation    Patient: Rani Lisa   MRN: [id number] : 1995        Procedure : Procedure(s):  ESOPHAGOGASTRODUODENOSCOPY          Past Medical History:   Diagnosis Date    Abdominal fluid collection     ADHD (attention deficit hyperactivity disorder)     Alcohol dependence (H)     Alcoholic pancreatitis     Depression     Hyperglycemia     Hypomagnesemia     Hyponatremia     Leukocytosis     Migraine     Nexplanon insertion     Pancreatic duct stricture       Past Surgical History:   Procedure Laterality Date    COLPOSCOPY      ENDOSCOPIC RETROGRADE CHOLANGIOPANCREATOGRAM N/A 2/3/2025    Procedure: ENDOSCOPIC RETROGRADE CHOLANGIOPANCREATOGRAPHY WITH BILIARY SPHINCTEROTOMY AND STENT PLACEMENT, PANCREATIC DUCT SPHINCTEROTOMY, STONE REMOVAL, STENT PLACEMENT;  Surgeon: Guru Elena Ortiz MD;  [facility]    ENDOSCOPIC RETROGRADE CHOLANGIOPANCREATOGRAM N/A 3/3/2025    Procedure: ENDOSCOPIC RETROGRADE CHOLANGIOPANCREATOGRAPHY, WITH Pancreatic Stent Exchange, Biliary Stent Removal, and sludge removal.;  Surgeon: Guru Elena Ortiz MD;  [facility]    ESOPHAGOSCOPY, GASTROSCOPY, DUODENOSCOPY (EGD), COMBINED N/A 2/3/2025    Procedure: ESOPHAGOGASTRODUODENOSCOPY, WITH FINE NEEDLE ASPIRATION BIOPSY, WITH ENDOSCOPIC ULTRASOUND GUIDANCE;  Surgeon: Guru Elena Ortiz MD;  Location: [facility]    ESOPHAGOSCOPY, GASTROSCOPY, DUODENOSCOPY (EGD), COMBINED N/A 3/3/2025    Procedure: Endoscopic ultrasound upper gastrointestinal tract (GI) with fine needle aspiration and cystgastrostomy stent placement.;  Surgeon: Guru Elena Ortiz MD;  Location: [facility] OR    IR VISCERAL ANGIOGRAM  2024    TONSILLECTOMY & ADENOIDECTOMY        Allergies   Allergen Reactions    Bactrim [Sulfamethoxazole-Trimethoprim] Rash      Social History     Tobacco Use    Smoking status: Every Day     Current packs/day: 0.50     Types: Cigarettes     "Smokeless tobacco: Never   Substance Use Topics    Alcohol use: Not Currently      Wt Readings from Last 1 Encounters:   03/03/25 58.6 kg (129 lb 3 oz)        Anesthesia Evaluation   Pt has had prior anesthetic. Type: General.        ROS/MED HX  ENT/Pulmonary:       Neurologic:     (+)      migraines,                          Cardiovascular:       METS/Exercise Tolerance: 4 - Raking leaves, gardening    Hematologic:       Musculoskeletal:       GI/Hepatic: Comment: Alcoholic pancreatitis      Renal/Genitourinary:     (+) renal disease,             Endo:       Psychiatric/Substance Use:     (+) psychiatric history anxiety and depression   Recreational drug usage: Cannabis.    Infectious Disease:       Malignancy:       Other:            Physical Exam    Airway        Mallampati: I   TM distance: > 3 FB   Neck ROM: full   Mouth opening: > 3 cm    Respiratory Devices and Support         Dental       (+) Minor Abnormalities - some fillings, tiny chips    B=Bridge, C=Chipped, L=Loose, M=Missing    Cardiovascular          Rhythm and rate: regular and normal     Pulmonary           breath sounds clear to auscultation       OUTSIDE LABS:  CBC:   Lab Results   Component Value Date    WBC 9.0 03/04/2025    WBC 6.0 03/04/2025    HGB 10.0 (L) 03/04/2025    HGB 7.2 (L) 03/04/2025    HCT 32.3 (L) 03/04/2025    HCT 22.5 (L) 03/04/2025     03/04/2025     03/04/2025     BMP:   Lab Results   Component Value Date     03/04/2025     03/04/2025    POTASSIUM 3.8 03/04/2025    POTASSIUM 4.1 03/04/2025    CHLORIDE 101 03/04/2025    CHLORIDE 103 03/04/2025    CO2 26 03/04/2025    CO2 14 (L) 03/04/2025    BUN 8.9 03/04/2025    BUN 5.7 (L) 03/04/2025    CR 0.74 03/04/2025    CR 0.40 (L) 03/04/2025     (H) 03/04/2025    GLC 55 (L) 03/04/2025     COAGS:   Lab Results   Component Value Date    PTT 28 01/19/2024    INR 1.03 03/03/2025     POC: No results found for: \"BGM\", \"HCG\", \"HCGS\"  HEPATIC:   Lab Results "   Component Value Date    ALBUMIN 1.6 (L) 03/04/2025    PROTTOTAL 3.0 (L) 03/04/2025    ALT 6 03/04/2025    AST 8 03/04/2025    ALKPHOS 48 03/04/2025    BILITOTAL <0.2 03/04/2025     OTHER:   Lab Results   Component Value Date    LACT 0.7 01/19/2024    DUSTIN 8.7 (L) 03/04/2025    PHOS 4.7 (H) 01/19/2024    MAG 1.9 01/24/2024    LIPASE 85 (H) 03/03/2025    AMYLASE 109 (H) 02/03/2025    TSH 17.50 (H) 01/22/2024    T4 1.19 01/22/2024       Anesthesia Plan    ASA Status:  3    NPO Status:  NPO Appropriate    Anesthesia Type: General.     - Airway: ETT   Induction: Intravenous.   Maintenance: Inhalation.   Techniques and Equipment:     - Lines/Monitors: 2nd IV     Consents    Anesthesia Plan(s) and associated risks, benefits, and realistic alternatives discussed. Questions answered and patient/representative(s) expressed understanding.     - Discussed: Risks, Benefits and Alternatives for BOTH SEDATION and the PROCEDURE were discussed     - Discussed with:  Patient      - Extended Intubation/Ventilatory Support Discussed: No.      - Patient is DNR/DNI Status: No     Use of blood products discussed: No .     Postoperative Care    Pain management: IV analgesics, Oral pain medications, Multi-modal analgesia.   PONV prophylaxis: Ondansetron (or other 5HT-3), Dexamethasone or Solumedrol, Aprepitant     Comments:               Alley Bocanegra MD    I have reviewed the pertinent notes and labs in the chart from the past 30 days and (re)examined the patient.  Any updates or changes from those notes are reflected in this note.    Clinically Significant Risk Factors Present on Admission                                 # Financial/Environmental Concerns:

## 2025-03-12 ENCOUNTER — APPOINTMENT (OUTPATIENT)
Dept: GENERAL RADIOLOGY | Facility: CLINIC | Age: 30
End: 2025-03-12
Attending: INTERNAL MEDICINE
Payer: COMMERCIAL

## 2025-03-12 ENCOUNTER — ANESTHESIA (OUTPATIENT)
Dept: SURGERY | Facility: CLINIC | Age: 30
End: 2025-03-12
Payer: COMMERCIAL

## 2025-03-12 ENCOUNTER — HOSPITAL ENCOUNTER (OUTPATIENT)
Facility: CLINIC | Age: 30
Discharge: HOME | End: 2025-03-12
Attending: INTERNAL MEDICINE | Admitting: INTERNAL MEDICINE
Payer: COMMERCIAL

## 2025-03-12 VITALS
HEART RATE: 85 BPM | HEIGHT: 65 IN | WEIGHT: 131.61 LBS | TEMPERATURE: 97.5 F | RESPIRATION RATE: 16 BRPM | BODY MASS INDEX: 21.93 KG/M2 | SYSTOLIC BLOOD PRESSURE: 96 MMHG | OXYGEN SATURATION: 100 % | DIASTOLIC BLOOD PRESSURE: 57 MMHG

## 2025-03-12 PROCEDURE — 250N000025 HC SEVOFLURANE, PER MIN: Performed by: INTERNAL MEDICINE

## 2025-03-12 PROCEDURE — 250N000011 HC RX IP 250 OP 636

## 2025-03-12 PROCEDURE — 250N000009 HC RX 250

## 2025-03-12 PROCEDURE — 999N000141 HC STATISTIC PRE-PROCEDURE NURSING ASSESSMENT: Performed by: INTERNAL MEDICINE

## 2025-03-12 PROCEDURE — 258N000003 HC RX IP 258 OP 636

## 2025-03-12 PROCEDURE — C1769 GUIDE WIRE: HCPCS | Performed by: INTERNAL MEDICINE

## 2025-03-12 PROCEDURE — C2617 STENT, NON-COR, TEM W/O DEL: HCPCS | Performed by: INTERNAL MEDICINE

## 2025-03-12 PROCEDURE — 250N000013 HC RX MED GY IP 250 OP 250 PS 637

## 2025-03-12 PROCEDURE — 999N000181 XR SURGERY CARM FLUORO GREATER THAN 5 MIN W STILLS

## 2025-03-12 PROCEDURE — 360N000082 HC SURGERY LEVEL 2 W/ FLUORO, PER MIN: Performed by: INTERNAL MEDICINE

## 2025-03-12 PROCEDURE — 710N000012 HC RECOVERY PHASE 2, PER MINUTE: Performed by: INTERNAL MEDICINE

## 2025-03-12 PROCEDURE — C1726 CATH, BAL DIL, NON-VASCULAR: HCPCS | Performed by: INTERNAL MEDICINE

## 2025-03-12 PROCEDURE — 710N000010 HC RECOVERY PHASE 1, LEVEL 2, PER MIN: Performed by: INTERNAL MEDICINE

## 2025-03-12 PROCEDURE — 250N000009 HC RX 250: Performed by: NURSE ANESTHETIST, CERTIFIED REGISTERED

## 2025-03-12 PROCEDURE — 272N000001 HC OR GENERAL SUPPLY STERILE: Performed by: INTERNAL MEDICINE

## 2025-03-12 PROCEDURE — 370N000017 HC ANESTHESIA TECHNICAL FEE, PER MIN: Performed by: INTERNAL MEDICINE

## 2025-03-12 DEVICE — STENT ZIMMON BILIARY 07FRX12CM DBL PIGTAIL: Type: IMPLANTABLE DEVICE | Site: STOMACH | Status: FUNCTIONAL

## 2025-03-12 RX ORDER — ONDANSETRON 4 MG/1
4 TABLET, ORALLY DISINTEGRATING ORAL EVERY 6 HOURS PRN
Status: CANCELLED | OUTPATIENT
Start: 2025-03-12

## 2025-03-12 RX ORDER — LIDOCAINE 40 MG/G
CREAM TOPICAL
Status: DISCONTINUED | OUTPATIENT
Start: 2025-03-12 | End: 2025-03-12 | Stop reason: HOSPADM

## 2025-03-12 RX ORDER — ONDANSETRON 2 MG/ML
4 INJECTION INTRAMUSCULAR; INTRAVENOUS EVERY 30 MIN PRN
Status: DISCONTINUED | OUTPATIENT
Start: 2025-03-12 | End: 2025-03-12 | Stop reason: HOSPADM

## 2025-03-12 RX ORDER — NALOXONE HYDROCHLORIDE 0.4 MG/ML
0.2 INJECTION, SOLUTION INTRAMUSCULAR; INTRAVENOUS; SUBCUTANEOUS
Status: DISCONTINUED | OUTPATIENT
Start: 2025-03-12 | End: 2025-03-12 | Stop reason: HOSPADM

## 2025-03-12 RX ORDER — OXYCODONE HYDROCHLORIDE 5 MG/1
5 TABLET ORAL
Status: DISCONTINUED | OUTPATIENT
Start: 2025-03-12 | End: 2025-03-12 | Stop reason: HOSPADM

## 2025-03-12 RX ORDER — FENTANYL CITRATE 50 UG/ML
25 INJECTION, SOLUTION INTRAMUSCULAR; INTRAVENOUS EVERY 5 MIN PRN
Status: DISCONTINUED | OUTPATIENT
Start: 2025-03-12 | End: 2025-03-12 | Stop reason: HOSPADM

## 2025-03-12 RX ORDER — NALOXONE HYDROCHLORIDE 0.4 MG/ML
0.2 INJECTION, SOLUTION INTRAMUSCULAR; INTRAVENOUS; SUBCUTANEOUS
Status: CANCELLED | OUTPATIENT
Start: 2025-03-12

## 2025-03-12 RX ORDER — HYDROMORPHONE HCL IN WATER/PF 6 MG/30 ML
0.2 PATIENT CONTROLLED ANALGESIA SYRINGE INTRAVENOUS EVERY 5 MIN PRN
Status: DISCONTINUED | OUTPATIENT
Start: 2025-03-12 | End: 2025-03-12 | Stop reason: HOSPADM

## 2025-03-12 RX ORDER — ONDANSETRON 2 MG/ML
4 INJECTION INTRAMUSCULAR; INTRAVENOUS EVERY 6 HOURS PRN
Status: DISCONTINUED | OUTPATIENT
Start: 2025-03-12 | End: 2025-03-12 | Stop reason: HOSPADM

## 2025-03-12 RX ORDER — NALOXONE HYDROCHLORIDE 0.4 MG/ML
0.4 INJECTION, SOLUTION INTRAMUSCULAR; INTRAVENOUS; SUBCUTANEOUS
Status: DISCONTINUED | OUTPATIENT
Start: 2025-03-12 | End: 2025-03-12 | Stop reason: HOSPADM

## 2025-03-12 RX ORDER — DEXAMETHASONE SODIUM PHOSPHATE 4 MG/ML
4 INJECTION, SOLUTION INTRA-ARTICULAR; INTRALESIONAL; INTRAMUSCULAR; INTRAVENOUS; SOFT TISSUE
Status: DISCONTINUED | OUTPATIENT
Start: 2025-03-12 | End: 2025-03-12 | Stop reason: HOSPADM

## 2025-03-12 RX ORDER — NALOXONE HYDROCHLORIDE 0.4 MG/ML
0.1 INJECTION, SOLUTION INTRAMUSCULAR; INTRAVENOUS; SUBCUTANEOUS
Status: DISCONTINUED | OUTPATIENT
Start: 2025-03-12 | End: 2025-03-12 | Stop reason: HOSPADM

## 2025-03-12 RX ORDER — ONDANSETRON 2 MG/ML
4 INJECTION INTRAMUSCULAR; INTRAVENOUS EVERY 6 HOURS PRN
Status: CANCELLED | OUTPATIENT
Start: 2025-03-12

## 2025-03-12 RX ORDER — ONDANSETRON 2 MG/ML
INJECTION INTRAMUSCULAR; INTRAVENOUS PRN
Status: DISCONTINUED | OUTPATIENT
Start: 2025-03-12 | End: 2025-03-12

## 2025-03-12 RX ORDER — ONDANSETRON 4 MG/1
4 TABLET, ORALLY DISINTEGRATING ORAL EVERY 30 MIN PRN
Status: DISCONTINUED | OUTPATIENT
Start: 2025-03-12 | End: 2025-03-12 | Stop reason: HOSPADM

## 2025-03-12 RX ORDER — FLUMAZENIL 0.1 MG/ML
0.2 INJECTION, SOLUTION INTRAVENOUS
Status: CANCELLED | OUTPATIENT
Start: 2025-03-12 | End: 2025-03-13

## 2025-03-12 RX ORDER — FLUMAZENIL 0.1 MG/ML
0.2 INJECTION, SOLUTION INTRAVENOUS
Status: DISCONTINUED | OUTPATIENT
Start: 2025-03-12 | End: 2025-03-12 | Stop reason: HOSPADM

## 2025-03-12 RX ORDER — EPHEDRINE SULFATE 50 MG/ML
INJECTION, SOLUTION INTRAMUSCULAR; INTRAVENOUS; SUBCUTANEOUS PRN
Status: DISCONTINUED | OUTPATIENT
Start: 2025-03-12 | End: 2025-03-12

## 2025-03-12 RX ORDER — ONDANSETRON 4 MG/1
4 TABLET, ORALLY DISINTEGRATING ORAL EVERY 6 HOURS PRN
Status: DISCONTINUED | OUTPATIENT
Start: 2025-03-12 | End: 2025-03-12 | Stop reason: HOSPADM

## 2025-03-12 RX ORDER — NALOXONE HYDROCHLORIDE 0.4 MG/ML
0.4 INJECTION, SOLUTION INTRAMUSCULAR; INTRAVENOUS; SUBCUTANEOUS
Status: CANCELLED | OUTPATIENT
Start: 2025-03-12

## 2025-03-12 RX ORDER — ACETAMINOPHEN 325 MG/1
975 TABLET ORAL ONCE
Status: COMPLETED | OUTPATIENT
Start: 2025-03-12 | End: 2025-03-12

## 2025-03-12 RX ORDER — FENTANYL CITRATE 50 UG/ML
50 INJECTION, SOLUTION INTRAMUSCULAR; INTRAVENOUS EVERY 5 MIN PRN
Status: DISCONTINUED | OUTPATIENT
Start: 2025-03-12 | End: 2025-03-12 | Stop reason: HOSPADM

## 2025-03-12 RX ORDER — DEXAMETHASONE SODIUM PHOSPHATE 4 MG/ML
INJECTION, SOLUTION INTRA-ARTICULAR; INTRALESIONAL; INTRAMUSCULAR; INTRAVENOUS; SOFT TISSUE PRN
Status: DISCONTINUED | OUTPATIENT
Start: 2025-03-12 | End: 2025-03-12

## 2025-03-12 RX ORDER — CIPROFLOXACIN 2 MG/ML
INJECTION, SOLUTION INTRAVENOUS PRN
Status: DISCONTINUED | OUTPATIENT
Start: 2025-03-12 | End: 2025-03-12

## 2025-03-12 RX ORDER — FENTANYL CITRATE 50 UG/ML
INJECTION, SOLUTION INTRAMUSCULAR; INTRAVENOUS PRN
Status: DISCONTINUED | OUTPATIENT
Start: 2025-03-12 | End: 2025-03-12

## 2025-03-12 RX ORDER — PROPOFOL 10 MG/ML
INJECTION, EMULSION INTRAVENOUS PRN
Status: DISCONTINUED | OUTPATIENT
Start: 2025-03-12 | End: 2025-03-12

## 2025-03-12 RX ORDER — LABETALOL HYDROCHLORIDE 5 MG/ML
10 INJECTION, SOLUTION INTRAVENOUS
Status: DISCONTINUED | OUTPATIENT
Start: 2025-03-12 | End: 2025-03-12 | Stop reason: HOSPADM

## 2025-03-12 RX ORDER — OXYCODONE HYDROCHLORIDE 10 MG/1
10 TABLET ORAL
Status: DISCONTINUED | OUTPATIENT
Start: 2025-03-12 | End: 2025-03-12 | Stop reason: HOSPADM

## 2025-03-12 RX ORDER — ONDANSETRON 4 MG/1
4 TABLET, ORALLY DISINTEGRATING ORAL EVERY 8 HOURS PRN
Qty: 30 TABLET | Refills: 0 | Status: SHIPPED | OUTPATIENT
Start: 2025-03-12

## 2025-03-12 RX ORDER — SODIUM CHLORIDE, SODIUM LACTATE, POTASSIUM CHLORIDE, CALCIUM CHLORIDE 600; 310; 30; 20 MG/100ML; MG/100ML; MG/100ML; MG/100ML
INJECTION, SOLUTION INTRAVENOUS CONTINUOUS
Status: DISCONTINUED | OUTPATIENT
Start: 2025-03-12 | End: 2025-03-12 | Stop reason: HOSPADM

## 2025-03-12 RX ORDER — ONDANSETRON 2 MG/ML
4 INJECTION INTRAMUSCULAR; INTRAVENOUS
Status: DISCONTINUED | OUTPATIENT
Start: 2025-03-12 | End: 2025-03-12 | Stop reason: HOSPADM

## 2025-03-12 RX ORDER — PROCHLORPERAZINE MALEATE 10 MG
10 TABLET ORAL EVERY 6 HOURS PRN
Status: CANCELLED | OUTPATIENT
Start: 2025-03-12

## 2025-03-12 RX ORDER — PROCHLORPERAZINE MALEATE 5 MG/1
10 TABLET ORAL EVERY 6 HOURS PRN
Status: DISCONTINUED | OUTPATIENT
Start: 2025-03-12 | End: 2025-03-12 | Stop reason: HOSPADM

## 2025-03-12 RX ORDER — HYDROMORPHONE HCL IN WATER/PF 6 MG/30 ML
0.4 PATIENT CONTROLLED ANALGESIA SYRINGE INTRAVENOUS EVERY 5 MIN PRN
Status: DISCONTINUED | OUTPATIENT
Start: 2025-03-12 | End: 2025-03-12 | Stop reason: HOSPADM

## 2025-03-12 RX ORDER — LIDOCAINE HYDROCHLORIDE 20 MG/ML
INJECTION, SOLUTION INFILTRATION; PERINEURAL PRN
Status: DISCONTINUED | OUTPATIENT
Start: 2025-03-12 | End: 2025-03-12

## 2025-03-12 RX ADMIN — PROPOFOL 130 MG: 10 INJECTION, EMULSION INTRAVENOUS at 14:09

## 2025-03-12 RX ADMIN — ACETAMINOPHEN 975 MG: 325 TABLET, FILM COATED ORAL at 11:09

## 2025-03-12 RX ADMIN — LIDOCAINE HYDROCHLORIDE 60 MG: 20 INJECTION, SOLUTION INFILTRATION; PERINEURAL at 14:09

## 2025-03-12 RX ADMIN — Medication 100 MG: at 15:05

## 2025-03-12 RX ADMIN — Medication 50 MG: at 14:09

## 2025-03-12 RX ADMIN — SODIUM CHLORIDE, POTASSIUM CHLORIDE, SODIUM LACTATE AND CALCIUM CHLORIDE: 600; 310; 30; 20 INJECTION, SOLUTION INTRAVENOUS at 14:03

## 2025-03-12 RX ADMIN — MIDAZOLAM 1 MG: 1 INJECTION INTRAMUSCULAR; INTRAVENOUS at 14:00

## 2025-03-12 RX ADMIN — PHENYLEPHRINE HYDROCHLORIDE 200 MCG: 10 INJECTION INTRAVENOUS at 14:27

## 2025-03-12 RX ADMIN — PHENYLEPHRINE HYDROCHLORIDE 100 MCG: 10 INJECTION INTRAVENOUS at 14:44

## 2025-03-12 RX ADMIN — CIPROFLOXACIN 400 MG: 400 INJECTION, SOLUTION INTRAVENOUS at 13:51

## 2025-03-12 RX ADMIN — FOSAPREPITANT 150 MG: 150 INJECTION, POWDER, LYOPHILIZED, FOR SOLUTION INTRAVENOUS at 11:46

## 2025-03-12 RX ADMIN — ONDANSETRON 4 MG: 2 INJECTION INTRAMUSCULAR; INTRAVENOUS at 15:06

## 2025-03-12 RX ADMIN — EPHEDRINE SULFATE 5 MG: 5 INJECTION INTRAVENOUS at 14:34

## 2025-03-12 RX ADMIN — DEXAMETHASONE SODIUM PHOSPHATE 4 MG: 4 INJECTION, SOLUTION INTRA-ARTICULAR; INTRALESIONAL; INTRAMUSCULAR; INTRAVENOUS; SOFT TISSUE at 14:25

## 2025-03-12 RX ADMIN — FENTANYL CITRATE 100 MCG: 50 INJECTION INTRAMUSCULAR; INTRAVENOUS at 14:09

## 2025-03-12 RX ADMIN — EPHEDRINE SULFATE 5 MG: 5 INJECTION INTRAVENOUS at 14:28

## 2025-03-12 RX ADMIN — EPHEDRINE SULFATE 5 MG: 5 INJECTION INTRAVENOUS at 15:00

## 2025-03-12 ASSESSMENT — ACTIVITIES OF DAILY LIVING (ADL)
ADLS_ACUITY_SCORE: 56

## 2025-03-12 NOTE — ANESTHESIA PROCEDURE NOTES
Airway       Patient location during procedure: OR       Procedure Start/Stop Times: 3/12/2025 2:16 PM  Staff -        Anesthesiologist:  Leodan Figueredo MD       Resident/Fellow: Alley Bocanegra MD       Performed By: resident and with residents       Procedure performed by resident/fellow/CRNA in presence of a teaching physician.    Consent for Airway        Urgency: elective  Indications and Patient Condition       Indications for airway management: romy-procedural       Induction type:intravenous       Mask difficulty assessment: 1 - vent by mask    Final Airway Details       Final airway type: endotracheal airway       Successful airway: ETT - single  Endotracheal Airway Details        ETT size (mm): 7.0       Cuffed: yes       Successful intubation technique: direct laryngoscopy       DL Blade Type: MAC 3       Grade View of Cords: 1       Adjucts: stylet       Position: Right       Measured from: lips       Secured at (cm): 22       Bite Block used: EGD block in place.    Post intubation assessment        Placement verified by: capnometry, equal breath sounds and chest rise        Number of attempts at approach: 1       Number of other approaches attempted: 0       Secured with: tape       Ease of procedure: easy       Dentition: Intact    Medication(s) Administered   Medication Administration Time: 3/12/2025 2:16 PM

## 2025-03-12 NOTE — ANESTHESIA POSTPROCEDURE EVALUATION
Patient: Rani Lisa    Procedure: Procedure(s):  ESOPHAGOGASTRODUODENOSCOPY WITH CYSTGASTROSTOMY STENT EXCHANGE AND PUS REMOVAL       Anesthesia Type:  General    Note:  Disposition: Outpatient   Postop Pain Control: Uneventful            Sign Out: Well controlled pain   PONV: No   Neuro/Psych: Uneventful            Sign Out: Acceptable/Baseline neuro status   Airway/Respiratory: Uneventful            Sign Out: Acceptable/Baseline resp. status   CV/Hemodynamics: Uneventful            Sign Out: Acceptable CV status; No obvious hypovolemia; No obvious fluid overload   Other NRE: NONE   DID A NON-ROUTINE EVENT OCCUR? No           Last vitals:  Vitals Value Taken Time   /66 03/12/25 1545   Temp 36.9  C (98.4  F) 03/12/25 1520   Pulse 99 03/12/25 1552   Resp 15 03/12/25 1552   SpO2 98 % 03/12/25 1552   Vitals shown include unfiled device data.    Electronically Signed By: Ade Hernandez DO  March 12, 2025  3:52 PM   [place] Pregnancy And Lactation Text: This medication has not been assigned a Pregnancy Risk Category. It is unknown if the medication is excreted in breast milk.

## 2025-03-12 NOTE — ANESTHESIA CARE TRANSFER NOTE
Patient: Rani Lisa    Procedure: Procedure(s):  ESOPHAGOGASTRODUODENOSCOPY WITH CYSTGASTROSTOMY STENT EXCHANGE AND PUS REMOVAL       Diagnosis: Necrotizing pancreatitis [K85.91]  Encounter for replacement of biliary stent [Z46.89]  Diagnosis Additional Information: No value filed.    Anesthesia Type:   General     Note:    Oropharynx: oropharynx clear of all foreign objects and spontaneously breathing  Level of Consciousness: awake  Oxygen Supplementation: room air      Dentition: dentition unchanged  Vital Signs Stable: post-procedure vital signs reviewed and stable  Report to RN Given: handoff report given  Patient transferred to: PACU  Comments: Awake, VSS  Handoff Report: Identifed the Patient, Identified the Reponsible Provider, Reviewed the pertinent medical history, Discussed the surgical course, Reviewed Intra-OP anesthesia mangement and issues during anesthesia, Set expectations for post-procedure period and Allowed opportunity for questions and acknowledgement of understanding    Vitals:  Vitals Value Taken Time   /71 03/12/25 1522   Temp     Pulse 101 03/12/25 1524   Resp 15 03/12/25 1524   SpO2 97 % 03/12/25 1524   Vitals shown include unfiled device data.    Electronically Signed By: SIMON Mike CRNA  March 12, 2025  3:24 PM

## 2025-03-12 NOTE — BRIEF OP NOTE
[facility]    Brief Operative Note    Pre-operative diagnosis: Necrotizing pancreatitis [K85.91]  Encounter for replacement of biliary stent [Z46.89]  Post-operative diagnosis Same as pre-operative diagnosis    Procedure: ESOPHAGOGASTRODUODENOSCOPY WITH CYSTGASTROSTOMY STENT EXCHANGE AND PUS REMOVAL, N/A - Esophagus    Surgeon: Surgeons and Role:     * Guru Elena Ortiz MD - Primary     * Sonya Langley MD - Fellow - Assisting  Anesthesia: General   Estimated Blood Loss: None    Drains: None  Specimens: * No specimens in log *    Findings:     - Previously placed fully expanded, widely patent AXIOS stent across the cystogastrostomy track was in good position with two coaxial metal and plastic stents   - All the stents were removed successfully  - Contrast was injected into the collection and showed no large solid component. The cavity was irrigated with saline with sludge and pus removal using a balloon catheter  - Successful placement of two 7 Fr x 12 cm double pigtailed stents across the cystogastrostomy track     Recommendations:   - Observe patient in PACU prior anticipated home discharge   - Resume previous diet   - Obtain CT A/P in 4 weeks to evaluate for interval changes and decide on timing for next EGD/ERCP   - Follow with Dr. Ortiz for further management   - The findings and recommendations were discussed with the patient.     Complications: None.  Implants:   Implant Name Type Inv. Item Serial No.  Lot No. LRB No. Used Action   STENT ZIMMON BILIARY [id number] DBL PIGTAIL - [id number] Stent STENT ZIMMON BILIARY [id number] DBL PIGTAIL  [facility] [id number] N/A 1 Explanted   GORE VIABIL BILIARY ENDOPROSTHESIS Stent  [id number]  N/A 1 Explanted   STENT ESU AXIOS W/DEL SYS [id number] 10.8FR 138CM [id number] - [id number] Stent STENT ESU AXIOS W/DEL SYS [id number] 10.8FR 138CM [id number]  [employer]  [id number] N/A 1 Explanted   STENT [facility] BILIARY [id number] DBL PIGTAIL - [id number] Stent STENT [facility] BILIARY [id number] DBL PIGTAIL  [employer] [id number] N/A 1 Implanted   STENT [facility] BILIARY [id number] DBL PIGTAIL - [id number] Stent STENT [facility] BILIARY [id number] DBL PIGTAIL  [employer] [id number] N/A 1 Implanted

## 2025-03-12 NOTE — DISCHARGE INSTRUCTIONS
After Anesthesia (Sleep Medicine)  What should I do after anesthesia?  You should rest and relax for the next 24 hours. Avoid risky or difficult (strenuous) activity. A responsible adult should stay with you overnight.  Don't drive or use any heavy equipment for 24 hours. Even if you feel normal, your reactions may be affected by the sleep medicine given to you.  Don't drink alcohol or make any important decisions for 24 hours.  Slowly get back to your regular diet, as you feel able.  How should I expect to feel?  It's normal to feel dizzy, light-headed, or faint for up to a full day after anesthesia or while taking pain medicine. If this happens:   Sit down for a few minutes before standing.  Have someone help you when you get up to walk or use the bathroom.  If you have nausea (feel sick to your stomach) or vomit (throw up):   Drink clear liquids (such as apple juice, ginger ale, broth, or 7UP) until you feel better.  If you feel sick to your stomach, or you keep vomiting for 24 hours, please call the doctor.  What else should I know?  You might have a dry mouth, sore throat, muscle aches, or trouble sleeping. These should go away after 24 hours.  Please contact your doctor if you have any other symptoms that concern you, such as fever, pain, bleeding, fluid drainage, swelling, or headache, or if it's been over 8 to 10 hours and you still aren't able to pee (urinate).  If you have a history of sleep apnea, it's very important to use your CPAP machine for the next 24 hours when you nap or sleep.   For informational purposes only. Not to replace the advice of your health care provider. Copyright   2023 [facility] [place] [facility]. All rights reserved. Clinically reviewed by Christian Casiano MD. [email or web address] [id number] - REV 09/23.  Contacting your Doctor -   To contact a doctor, call Dr. Guru Ortiz @ [phone] (GI Clinic) or [phone] (Monday-Friday 8-4:30) or:  [phone] and ask for the resident on call  for Gastroenterology (answered 24 hours a day)   Emergency Department:  [place]: [phone]  [place]: [phone] 911 if you are in need of immediate or emergent help

## 2025-03-12 NOTE — TELEPHONE ENCOUNTER
Requested Prescriptions   Pending Prescriptions Disp Refills    ondansetron (ZOFRAN ODT) 4 MG ODT tab       Sig: Place 1 tablet (4 mg) under the tongue.        Antivertigo/Antiemetic Agents Passed - 3/12/2025  2:49 PM        Passed - Medication is active on med list and the sig matches. RN to manually verify dose and sig if red X/fail.     If the protocol passes (green check), you do not need to verify med dose and sig.    A prescription matches if they are the same clinical intention.    For Example: once daily and every morning are the same.    The protocol can not identify upper and lower case letters as matching and will fail.     For Example: Take 1 tablet (50 mg) by mouth daily     TAKE 1 TABLET (50 MG) BY MOUTH DAILY    For all fails (red x), verify dose and sig.    If the refill does match what is on file, the RN can still proceed to approve the refill request.       If they do not match, route to the appropriate provider.             Passed - Medication indicated for associated diagnosis     The medication is prescribed for one or more of the following conditions:     Motion sickness   Nausea   Vomiting   Vertigo   Chemotherapy-induced nausea and vomiting   Radiation-induced nausea and vomiting,    Nausea and vomiting prophylaxis, migraine          Passed - Recent (12 mo) or future (90 days) visit with authorizing provider's specialty     The patient must have completed an in-person or virtual visit within the past 12 months or has a future visit scheduled within the next 90 days with the authorizing provider s specialty.  Urgent care and e-visits do not qualify as an office visit for this protocol.          Passed - Patient is 18 years of age or older          Medication is patient reported. Has upcoming appt 3/27/25 with Dr. Dodie Lofton.   Julie Behrendt RN

## 2025-03-16 ENCOUNTER — HEALTH MAINTENANCE LETTER (OUTPATIENT)
Age: 30
End: 2025-03-16

## 2025-03-17 LAB — UPPER GI ENDOSCOPY: NORMAL

## 2025-03-24 ENCOUNTER — TELEPHONE (OUTPATIENT)
Dept: GASTROENTEROLOGY | Facility: CLINIC | Age: 30
End: 2025-03-24
Payer: COMMERCIAL

## 2025-03-24 ENCOUNTER — CARE COORDINATION (OUTPATIENT)
Dept: GASTROENTEROLOGY | Facility: CLINIC | Age: 30
End: 2025-03-24
Payer: COMMERCIAL

## 2025-03-24 DIAGNOSIS — K86.3 PSEUDOCYST OF PANCREAS: ICD-10-CM

## 2025-03-24 DIAGNOSIS — K86.3 PSEUDOCYST OF PANCREAS DUE TO ACUTE PANCREATITIS: Primary | ICD-10-CM

## 2025-03-24 DIAGNOSIS — K85.90 PSEUDOCYST OF PANCREAS DUE TO ACUTE PANCREATITIS: Primary | ICD-10-CM

## 2025-03-24 NOTE — TELEPHONE ENCOUNTER
"Left Voicemail (1st Attempt) and Sent Mychart (1st Attempt) for the patient to call back and schedule the following:    Appointment type: Return  Provider:   Return date: 6-8 Wks  Specialty phone number: [phone]  Additional appointment(s) needed:   Additonal Notes:     Per ERIN Barba (See Below)     \" Hello,     Please assist Rayshawn to schedule a CT scan (due approximately 4/9/25) followed by a clinic visit with Dr. Ortiz in 6-8 weeks     When: Clinic 6-8 weeks     New/Return patient: Return     Virtual/In person: Patient preference     Reason for visit (please add to appointment notes): Pancreatic pseudocyst     Thank you,     Freda Davison RN Care Coordinator \"     3/24 AA  "

## 2025-03-24 NOTE — PROGRESS NOTES
Post EGD on 3/12/25 with Dr. Ortiz.      Follow-up recommendations:          - Obtain CT A/P in 4 weeks to evaluate for interval                          changes and decide on timing for next EGD/ERCP                          - Follow with Dr. Ortiz in 6 to 8 weeks in                          Pancreas clinic for further management     Orders placed:   -CT A/P with contrast; due approximately 4/9/25.     -Message to clinic coordinators to assist in scheduling imaging and follow-up appointment with Dr. Ortiz.      Freda Davison RN Care Coordinator    
Abdoulaye Javed(Attending)

## 2025-03-26 ENCOUNTER — TELEPHONE (OUTPATIENT)
Dept: GASTROENTEROLOGY | Facility: CLINIC | Age: 30
End: 2025-03-26
Payer: COMMERCIAL

## 2025-03-26 NOTE — TELEPHONE ENCOUNTER
Left Voicemail (2nd Attempt) for the patient to call back and schedule the following:    Appointment type: MRI    Provider: Dr. Andino  Return date:  6-8 weeks   Specialty phone number: [phone]  Additional appointment(s) needed:   Additonal Notes:

## 2025-04-14 DIAGNOSIS — F90.9 ATTENTION DEFICIT HYPERACTIVITY DISORDER (ADHD), UNSPECIFIED ADHD TYPE: ICD-10-CM

## 2025-04-15 RX ORDER — LISDEXAMFETAMINE DIMESYLATE 10 MG/1
10 CAPSULE ORAL DAILY
Qty: 30 CAPSULE | Refills: 0 | Status: SHIPPED | OUTPATIENT
Start: 2025-04-15

## 2025-04-15 RX ORDER — LISDEXAMFETAMINE DIMESYLATE 20 MG/1
CAPSULE ORAL
Qty: 30 CAPSULE | Refills: 0 | Status: SHIPPED | OUTPATIENT
Start: 2025-04-15

## 2025-04-25 ENCOUNTER — HOSPITAL ENCOUNTER (OUTPATIENT)
Dept: CT IMAGING | Facility: CLINIC | Age: 30
Discharge: HOME | End: 2025-04-25
Attending: STUDENT IN AN ORGANIZED HEALTH CARE EDUCATION/TRAINING PROGRAM | Admitting: STUDENT IN AN ORGANIZED HEALTH CARE EDUCATION/TRAINING PROGRAM
Payer: COMMERCIAL

## 2025-04-25 DIAGNOSIS — K86.3 PSEUDOCYST OF PANCREAS: ICD-10-CM

## 2025-04-25 PROCEDURE — 74177 CT ABD & PELVIS W/CONTRAST: CPT

## 2025-04-25 PROCEDURE — 250N000009 HC RX 250: Performed by: RADIOLOGY

## 2025-04-25 PROCEDURE — 250N000011 HC RX IP 250 OP 636: Performed by: RADIOLOGY

## 2025-04-25 RX ORDER — IOPAMIDOL 755 MG/ML
64 INJECTION, SOLUTION INTRAVASCULAR ONCE
Status: COMPLETED | OUTPATIENT
Start: 2025-04-25 | End: 2025-04-25

## 2025-04-25 RX ADMIN — SODIUM CHLORIDE 56 ML: 9 INJECTION, SOLUTION INTRAVENOUS at 15:23

## 2025-04-25 RX ADMIN — IOPAMIDOL 64 ML: 755 INJECTION, SOLUTION INTRAVENOUS at 15:23

## 2025-05-01 ENCOUNTER — CARE COORDINATION (OUTPATIENT)
Dept: GASTROENTEROLOGY | Facility: CLINIC | Age: 30
End: 2025-05-01
Payer: COMMERCIAL

## 2025-05-01 DIAGNOSIS — K86.89 PANCREATIC DUCT STONES: Primary | ICD-10-CM

## 2025-05-01 NOTE — PROGRESS NOTES
Following review of recent imaging, per Dr. Ortiz: CT shows collection has completely resolved and cavity has collapsed. Transmural stents are still insitu.  Will recommend ERCP for PD stone management soon.    Discussed recommendations with patient via MyChart. She is agreeable to ERCP on 25 for PD stone/stent management.     Please assist in scheduling:     Procedure/Imaging/Clinic: Endoscopic Retrograde Cholangiopancreatography (ERCP)   Physician: Angel  Timin25  Scope time: Provider average   Anesthesia: General  Dx: Pancreatic duct stones  Tier:3  Location: [facility]  OK to schedule while attending: Not specified by provider   Patient communication letter header:Endoscopic Retrograde Cholangiopancreatography (ERCP)     Preop Plan: PAC referral    Med Review    Blood thinner -  None  ASA - None  Diabetic - None   Injectable or oral medications for weight loss - None    Patient Education r/t procedure: [place]    Message to OR scheduling to arrange procedure.     Freda Davison, RN Care Coordinator

## 2025-05-01 NOTE — RESULT ENCOUNTER NOTE
CT shows collection has completely resolved and cavity has collapsed. Transmural stents are still insitu    Will recommend ERCP for PD stone management soon

## 2025-05-07 NOTE — TELEPHONE ENCOUNTER
FUTURE VISIT INFORMATION      SURGERY INFORMATION:  Date: 2025   Location: [facility] OR   Surgeon:  Guru Elena Ortiz MD   Anesthesia Type:  General   Procedure: ENDOSCOPIC RETROGRADE CHOLANGIOPANCREATOGRAPHY   Consult: 25- Freda Davison RN     RECORDS REQUESTED FROM:       Primary Care Provider:Dodie Lofton MD    Most recent EKG+ Tracin24

## 2025-05-08 ENCOUNTER — ANESTHESIA EVENT (OUTPATIENT)
Dept: SURGERY | Facility: CLINIC | Age: 30
End: 2025-05-08
Payer: COMMERCIAL

## 2025-05-08 ENCOUNTER — PRE VISIT (OUTPATIENT)
Dept: SURGERY | Facility: CLINIC | Age: 30
End: 2025-05-08

## 2025-05-08 ENCOUNTER — VIRTUAL VISIT (OUTPATIENT)
Dept: SURGERY | Facility: CLINIC | Age: 30
End: 2025-05-08
Attending: INTERNAL MEDICINE
Payer: COMMERCIAL

## 2025-05-08 VITALS — HEIGHT: 65 IN | BODY MASS INDEX: 22.82 KG/M2 | WEIGHT: 137 LBS

## 2025-05-08 DIAGNOSIS — Z01.818 PREOP EXAMINATION: Primary | ICD-10-CM

## 2025-05-08 DIAGNOSIS — K86.89 PANCREATIC DUCT STONES: ICD-10-CM

## 2025-05-08 ASSESSMENT — LIFESTYLE VARIABLES: TOBACCO_USE: 1

## 2025-05-08 ASSESSMENT — ENCOUNTER SYMPTOMS
SEIZURES: 0
DYSRHYTHMIAS: 0

## 2025-05-08 ASSESSMENT — PAIN SCALES - GENERAL: PAINLEVEL_OUTOF10: NO PAIN (0)

## 2025-05-08 NOTE — PROGRESS NOTES
Rayshawn is a 29 year old who is being evaluated via a billable video visit.    How would you like to obtain your AVS? MyChart  If the video visit is dropped, the invitation should be resent by: Text to cell phone: [phone]      Subjective   Rayshawn is a 29 year old, presenting for the following health issues:  Pre-Op Exam    HPI          Physical Exam

## 2025-05-08 NOTE — H&P
Pre-Operative H & P     CC:  Preoperative exam to assess for increased cardiopulmonary risk while undergoing surgery and anesthesia.    Date of Encounter: 5/8/2025  Primary Care Physician:  Dodie Lofton     Reason for visit:   Encounter Diagnoses   Name Primary?    Pancreatic duct stones     Preop examination Yes       HPI  Rani Lisa is a 29 year old female who presents for pre-operative H & P in preparation for  Procedure Information       Case: [id number] Date/Time: 05/19/25 1320    Procedure: ENDOSCOPIC RETROGRADE CHOLANGIOPANCREATOGRAPHY (Mouth)    Anesthesia type: General    Diagnosis: Pancreatic duct stones [K86.89]    Pre-op diagnosis: Pancreatic duct stones [K86.89]    Location:  OR  /  OR    Providers: Guru Elena Ortiz MD          History is obtained from the patient and chart review    Patient with history significant for alcohol induced pancreatitis, and chronic calcific pancreatitis, complicated by pseudocyst and pseudoaneurysm s/p coiling embolization on 1/19/2024.  She was referred to Dr. Ortiz on 1/9/2025, for further evaluation and management. She is s/p multiple ERCPs, last on 3/12/25, with findings of patent prior biliary sphincterotomy, irregular main pancreatic duct on pancreatogram without evidence of PD leak, and successful removal of FCSMS from the bile duct.  Repeat EGD/ERCP recommended in 4 to 6 weeks for stent removal/exchange with possible necrosectomy.  Above procedure now planned.    Patient's history is otherwise significant for GERD, migraine, ADHD, and depression    Hx of abnormal bleeding or anti-platelet use: Denies    Menstrual history: Patient's last menstrual period was 04/28/2025.     Past Medical History  Past Medical History:   Diagnosis Date    Abdominal fluid collection     ADHD (attention deficit hyperactivity disorder)     Alcohol dependence (H)     Alcoholic pancreatitis     Depression     Hyperglycemia     Hypomagnesemia      Hyponatremia     Leukocytosis     Migraine     Nexplanon insertion     Pancreatic duct stones     Pancreatic duct stricture        Past Surgical History  Past Surgical History:   Procedure Laterality Date    COLPOSCOPY      ENDOSCOPIC RETROGRADE CHOLANGIOPANCREATOGRAM N/A 2/3/2025    Procedure: ENDOSCOPIC RETROGRADE CHOLANGIOPANCREATOGRAPHY WITH BILIARY SPHINCTEROTOMY AND STENT PLACEMENT, PANCREATIC DUCT SPHINCTEROTOMY, STONE REMOVAL, STENT PLACEMENT;  Surgeon: Guru Elena Ortiz MD;  Location: [facility] OR    ENDOSCOPIC RETROGRADE CHOLANGIOPANCREATOGRAM N/A 3/3/2025    Procedure: ENDOSCOPIC RETROGRADE CHOLANGIOPANCREATOGRAPHY, WITH Pancreatic Stent Exchange, Biliary Stent Removal, and sludge removal.;  Surgeon: Guru Elena Ortiz MD;  Location: [facility] OR    ESOPHAGOSCOPY, GASTROSCOPY, DUODENOSCOPY (EGD), COMBINED N/A 2/3/2025    Procedure: ESOPHAGOGASTRODUODENOSCOPY, WITH FINE NEEDLE ASPIRATION BIOPSY, WITH ENDOSCOPIC ULTRASOUND GUIDANCE;  Surgeon: Guru Elena Ortiz MD;  Location: [facility] OR    ESOPHAGOSCOPY, GASTROSCOPY, DUODENOSCOPY (EGD), COMBINED N/A 3/3/2025    Procedure: Endoscopic ultrasound upper gastrointestinal tract (GI) with fine needle aspiration and cystgastrostomy stent placement.;  Surgeon: Guru Elena Ortiz MD;  Location: [facility] OR    ESOPHAGOSCOPY, GASTROSCOPY, DUODENOSCOPY (EGD), COMBINED N/A 3/12/2025    Procedure: ESOPHAGOGASTRODUODENOSCOPY WITH CYSTGASTROSTOMY STENT EXCHANGE AND PUS REMOVAL;  Surgeon: Guru Elena Ortiz MD;  Location: [facility] OR    IR VISCERAL ANGIOGRAM  01/19/2024    TONSILLECTOMY & ADENOIDECTOMY         Prior to Admission Medications  Current Outpatient Medications   Medication Sig Dispense Refill    lisdexamfetamine (VYVANSE) 10 MG capsule Take 1 capsule (10 mg) by mouth daily. (Patient taking differently: Take 10 mg by mouth daily at 2 pm.) 30 capsule 0    lisdexamfetamine (VYVANSE) 20 MG capsule Take 1  capsule (20 mg) by mouth daily (with lunch). (Patient taking differently: Take 20 mg by mouth every morning.) 30 capsule 0    multivitamin w/minerals (MULTI-VITAMIN) tablet Take 1 tablet by mouth every morning.      omeprazole (PRILOSEC) 20 MG DR capsule Take 1 capsule (20 mg) by mouth daily. (Patient taking differently: Take 20 mg by mouth every morning.) 90 capsule 3    ondansetron (ZOFRAN ODT) 4 MG ODT tab Place 1 tablet (4 mg) under the tongue every 8 hours as needed for nausea. 30 tablet 0       Allergies  Allergies   Allergen Reactions    Bactrim [Sulfamethoxazole-Trimethoprim] Rash       Social History  Social History     Socioeconomic History    Marital status:      Spouse name: Not on file    Number of children: Not on file    Years of education: Not on file    Highest education level: Not on file   Occupational History    Not on file   Tobacco Use    Smoking status: Every Day     Current packs/day: 0.50     Types: Cigarettes    Smokeless tobacco: Never    Tobacco comments:     3-5 cpd   Vaping Use    Vaping status: Every Day    Substances: Nicotine, Flavoring    Devices: Disposable   Substance and Sexual Activity    Alcohol use: Not Currently    Drug use: Yes     Types: Marijuana     Comment: daily marijuana smoking    Sexual activity: Not on file   Other Topics Concern    Not on file   Social History Narrative    Not on file     Social Drivers of Health     Financial Resource Strain: Low Risk  (3/4/2025)    Financial Resource Strain     Within the past 12 months, have you or your family members you live with been unable to get utilities (heat, electricity) when it was really needed?: No   Food Insecurity: Low Risk  (3/4/2025)    Food Insecurity     Within the past 12 months, did you worry that your food would run out before you got money to buy more?: No     Within the past 12 months, did the food you bought just not last and you didn t have money to get more?: No   Transportation Needs: Low Risk   (3/4/2025)    Transportation Needs     Within the past 12 months, has lack of transportation kept you from medical appointments, getting your medicines, non-medical meetings or appointments, work, or from getting things that you need?: No   Physical Activity: Not on file   Stress: Not on file   Social Connections: Socially Integrated (2/23/2025)    [facility] from [facility]    Social Connections     Do you often feel lonely or isolated from those around you?: 0   Interpersonal Safety: Low Risk  (3/12/2025)    Interpersonal Safety     Do you feel physically and emotionally safe where you currently live?: Yes     Within the past 12 months, have you been hit, slapped, kicked or otherwise physically hurt by someone?: No     Within the past 12 months, have you been humiliated or emotionally abused in other ways by your partner or ex-partner?: No   Housing Stability: Low Risk  (3/4/2025)    Housing Stability     Do you have housing? : Yes     Are you worried about losing your housing?: No       Family History  Family History   Problem Relation Age of Onset    Thrombosis Paternal Grandmother     Thrombosis Paternal Grandfather     Diabetes Other     Anesthesia Reaction No family hx of     Bleeding Disorder No family hx of        Review of Systems  The complete review of systems is negative other than noted in the HPI or here.   Anesthesia Evaluation   Pt has had prior anesthetic. Type: General and MAC.    No history of anesthetic complications       ROS/MED HX  ENT/Pulmonary: Comment: Currently smoking  3-5 cigs daily  Smoking marijuana daily    (+)                tobacco use, Current use,   patient smoked within 24 hours,          recent URI, resolved,         Neurologic: Comment: ADHD    (+)      migraines,                       (-) no seizures   Cardiovascular:     (+)  - -   -  - -                                 Previous cardiac testing   Echo: Date: Results:    Stress Test:  Date:  Results:    ECG Reviewed:  Date: 1/9/2024 Results:  ST  Cath:  Date: Results:   (-) taking anticoagulants/antiplatelets, LUNA and arrhythmias   METS/Exercise Tolerance: >4 METS    Hematologic:  - neg hematologic  ROS  (-) history of blood transfusion   Musculoskeletal:  - neg musculoskeletal ROS     GI/Hepatic: Comment: Alcohol induced pancreatitis  Pseudocyst  Pseudoaneurysm status post coiling embolization on 1/19/2024    No recent abd pain, N/V    (+) GERD, Asymptomatic on medication,                  Renal/Genitourinary: Comment: Renal cyst      Endo:  - neg endo ROS  (-) Type II DM   Psychiatric/Substance Use: Comment: Alcohol in remission    (+) psychiatric history depression alcohol abuse      Infectious Disease:  - neg infectious disease ROS     Malignancy:  - neg malignancy ROS     Other:  - neg other ROS          Virtual visit -  No vitals were obtained    Physical Exam  Constitutional: Awake, alert, no apparent distress, and appears stated age.  HENT: Normocephalic  Respiratory: non labored breathing; no cough  Neurologic: Oriented to name, place and time.   Neuropsychiatric: Calm, cooperative. Normal affect.      Prior Labs/Diagnostic Studies   All labs and imaging personally reviewed   Lab Results   Component Value Date    WBC 9.0 03/04/2025     Lab Results   Component Value Date    RBC 3.60 03/04/2025     Lab Results   Component Value Date    HGB 10.0 03/04/2025     Lab Results   Component Value Date    HCT 32.3 03/04/2025     Lab Results   Component Value Date    MCV 90 03/04/2025     Lab Results   Component Value Date    MCH 27.8 03/04/2025     Lab Results   Component Value Date    MCHC 31.0 03/04/2025     Lab Results   Component Value Date    RDW 14.1 03/04/2025     Lab Results   Component Value Date     03/04/2025     Last Comprehensive Metabolic Panel:  Lab Results   Component Value Date     03/04/2025    POTASSIUM 3.8 03/04/2025    CHLORIDE 101 03/04/2025    CO2 26 03/04/2025     "ANIONGAP 9 2025     (H) 2025    BUN 8.9 2025    CR 0.74 2025    GFRESTIMATED >90 2025    DUSTIN 8.7 (L) 2025     Lab Results   Component Value Date    AST 8 2025     Lab Results   Component Value Date    ALT 6 2025     No results found for: \"BILICONJ\"   Lab Results   Component Value Date    BILITOTAL <0.2 2025     Lab Results   Component Value Date    ALBUMIN 1.6 2025     Lab Results   Component Value Date    PROTTOTAL 3.0 2025      Lab Results   Component Value Date    ALKPHOS 48 2025     INR 1.03    EK24 Sinus tachycardia    3/3/25 CT abd/pelvis                                           IMPRESSION:   1. Redemonstration of pancreatic pseudocysts along the stomach and  large pseudocyst extending from the pancreatic tail to the left  kidney. Significant mass effect on the left kidney is unchanged.  2. Air within the gallbladder and along the portal vein, likely  iatrogenic.   3. Cystogastrostomy tube in place.      The patient's records and results personally reviewed by this provider.     Outside records reviewed from: Care Everywhere      Assessment    Rani Lisa is a 29 year old female seen as a PAC referral for risk assessment and optimization for anesthesia.    Plan/Recommendations  Pt will be optimized for the proposed procedure.  See below for details on the assessment, risk, and preoperative recommendations    NEUROLOGY  - No history of TIA, CVA or seizure  Migraine history  ADHD.  Hold Vyvanse on day of procedure  -Post Op delirium risk factors:  No risk identified    ENT  - No current airway concerns.  Will need to be reassessed day of surgery.  Mallampati: Unable to assess  TM: Unable to assess    Multiple anesthesia records available    CARDIAC  Denies cardiac history, symptoms, or meds.  Good exercise tolerance without exertional symptoms   - METS (Metabolic Equivalents)>4    RCRI: 0.4% risk of serious cardiac " "events    PULMONARY  Continued smoking 3-5 cigarettes daily  Daily marijuana smoking but will hold for 24 hours prior to procedure  Denies asthma, cough or use of inhaler  Low risk for ZEUS    - Tobacco History    History   Smoking Status    Every Day    Types: Cigarettes   Smokeless Tobacco    Never       GI: GERD controlled with omeprazole and will take on day of procedure   PONV Medium Risk  Total Score: 2           1 AN PONV: Pt is Female    1 AN PONV: Intended Post Op Opioids        /RENAL  - Baseline Creatinine  0.74    ENDOCRINE    - BMI: Estimated body mass index is 22.8 kg/m  as calculated from the following:    Height as of this encounter: 1.651 m (5' 5\").    Weight as of this encounter: 62.1 kg (137 lb).  Healthy Weight (BMI 18.5-24.9)  Prediabetes, last A1c 6.0    HEME: VTE risk 1.8%  Family history of blood clots in paternal grandfather and grandmother  Denies personal history of blood clots  Denies personal or family history of bleeding disorder  Denies history of blood transfusion      MSK  Patient is NOT Frail             Total Score: 0        PSYCH  - Depression     Different anesthesia methods/types have been discussed with the patient, but they are aware that the final plan will be decided by the assigned anesthesia provider on the date of service.      The patient is optimized for their procedure. AVS with information on surgery time/arrival time, meds and NPO status given by nursing staff. No further diagnostic testing indicated.    Please refer to the physical examination documented by the anesthesiologist in the anesthesia record on the day of surgery.    Video-Visit Details    Type of service:  Video Visit    Provider received verbal consent for a Video Visit from the patient? Yes     Originating Location (pt. Location): Parked in their car    Distant Location (provider location):  Off-site  Mode of Communication:  Video Conference via Pianpian  On the day of service:     Prep time: 15 " minutes  Visit time: 17 minutes  Documentation time: 12 minutes  ------------------------------------------  Total time: 44 minutes      SIMON Cano CNS  Preoperative Assessment Center  [facility]  Clinic and Surgery Center  Phone: [phone]  Fax: [phone]

## 2025-05-08 NOTE — PATIENT INSTRUCTIONS
Preparing for Your Surgery      Name:  Rani Lisa   MRN:  [id number]   :  1995   Today's Date:  2025     The [place] [employer] [address] Construction Project                                Timeline 2025 -2025    This project will affect travel to the [facility] and [facility], as well as the [facility].      Please check the [facility] [address] project website for the most up to date information and give yourself additional time to reach your destination.        Arriving for surgery:  Surgery date:  2025  Arrival time:  11:20 am    Please come to:     Please come to:       [facility]    [address] [place]  [address]     The [facility] [place] Patient/Visitor Ramp is at [address]. Patients and visitors who self-park will receive the reduced hospital parking rate. If the Patient /Visitor Ramp is full, please follow the signs to the [facility] car park located at the [facility] hospital entrance.      [employer] parking is available (24 hours/ 7 days a week)      Discounted parking pass options are available for patients and visitors. They can be purchased at the theScore desk at the [facility] entrance.     -    Stop at the security desk and they will direct surgery patients to the Surgery Check in and Family Lounge. [phone]        - If you need directions, a wheelchair or an escort please stop at the Information/security desk in the lobby.     What can I eat or drink?  -  You may eat and drink normally up to 8 hours prior to arrival time. (Until 3:00 am the morning of surgery)  -  You may have clear liquids until 2 hours prior to arrival time. (Until 9:00 am)    Examples of clear liquids:  Water  Clear broth  Juices (apple, white grape, white cranberry  and cider) without pulp  Noncarbonated, powder based  beverages  (lemonade and Kurt-Aid)  Sodas (Sprite, 7-Up, ginger ale and seltzer)  Coffee or tea (without milk or cream)  Gatorade    -  No Alcohol or cannabis products for at least 24 hours before surgery.     Which medicines can I take?    Hold Aspirin for 7 days before surgery.   Hold Multivitamins for 7 days before surgery.  Hold Supplements for 7 days before surgery.  Hold Ibuprofen (Advil, Motrin) for 3 day(s) before surgery--unless otherwise directed by surgeon.  Hold Naproxen (Aleve) for 4 days before surgery.    NO CANNABIS 24 HOURS PRIOR TO SURGERY    -  DO NOT take these medications the day of surgery:  Vyvanse    -  PLEASE TAKE these medications the day of surgery:  Omeprazole (Prilosec)  Ondansetron (Zofran) if needed    How do I prepare myself?  - Please take 2 showers (one the night prior to surgery and one the morning of surgery) using Scrubcare or Hibiclens soap.    Use this soap only from the neck to your toes. Avoid genital area      Leave the soap on your skin for one minute--then rinse thoroughly.      You may use your own shampoo and conditioner. No other hair products.   - Please remove all jewelry and body piercings.  - No lotions, deodorants or fragrance.  - No makeup or fingernail polish.   - Bring your ID and insurance card.    -For patients being admitted to the [facility]  Family members are to take the patient belongings with them and place them in the lockers [facility]  Please limit the items you bring to 1 bag as the lockers are small.      -If you use a CPAP machine, please bring the CPAP machine, tubing, and mask to hospital.    -If you have a Deep Brain Stimulator, Spinal Cord Stimulator, or any Neuro Stimulator device---you must bring the remote control to the hospital.      ALL PATIENTS GOING HOME THE SAME DAY OF SURGERY ARE REQUIRED TO HAVE A RESPONSIBLE ADULT TO DRIVE AND BE IN ATTENDANCE WITH THEM FOR 24 HOURS FOLLOWING SURGERY.    Covid testing  policy as of 12/06/2022  Your surgeon will notify and schedule you for a COVID test if one is needed before surgery--please direct any questions or COVID symptoms to your surgeon      Questions or Concerns:    - For any questions regarding the day of surgery or your hospital stay, please contact the Pre Admission Nursing Office at [phone].       - If you have health changes between today and your surgery, please call your surgeon.       - For questions after surgery, please call your surgeons office.           Current Visitor Guidelines    2 adult visitors for adult patients in the pre op area    If additional visitors come (beyond a patient care attendant or a group home caregiver), the additional visitors will be asked to wait in the main lobby of the hospital    Visiting hours: 8 a.m. to 8:30 p.m.    Patients confirmed or suspected to have symptoms of COVID 19 or flu:     No visitors allowed for adult patients.   Children (under age 18) can have 1 named visitor.     People who are sick or showing symptoms of COVID 19 or flu:    Are not allowed to visit patients--we can only make exceptions in special situations.       Please follow these guidelines for your visit:          Please maintain social distance          Masking is optional--however at times you may be asked to wear a mask for the safety of yourself and others     Clean your hands with alcohol hand . Do this when you arrive at and leave the building and patient room,    And again after you touch your mask or anything in the room.     Go directly to and from the room you are visiting.     Stay in the patient s room during your visit. Limit going to other places in the hospital as much as possible     Leave bags and jackets at home or in the car.     For everyone s health, please don t come and go during your visit. That includes for smoking   during your visit.

## 2025-05-13 ENCOUNTER — MYC REFILL (OUTPATIENT)
Dept: FAMILY MEDICINE | Facility: CLINIC | Age: 30
End: 2025-05-13
Payer: COMMERCIAL

## 2025-05-13 DIAGNOSIS — K21.9 GASTROESOPHAGEAL REFLUX DISEASE, UNSPECIFIED WHETHER ESOPHAGITIS PRESENT: ICD-10-CM

## 2025-05-13 DIAGNOSIS — F90.9 ATTENTION DEFICIT HYPERACTIVITY DISORDER (ADHD), UNSPECIFIED ADHD TYPE: ICD-10-CM

## 2025-05-13 RX ORDER — LISDEXAMFETAMINE DIMESYLATE 20 MG/1
20 CAPSULE ORAL EVERY MORNING
Qty: 30 CAPSULE | Refills: 0 | Status: SHIPPED | OUTPATIENT
Start: 2025-06-13

## 2025-05-13 RX ORDER — LISDEXAMFETAMINE DIMESYLATE 10 MG/1
10 CAPSULE ORAL
Qty: 30 CAPSULE | Refills: 0 | Status: SHIPPED | OUTPATIENT
Start: 2025-05-13

## 2025-05-13 RX ORDER — LISDEXAMFETAMINE DIMESYLATE 10 MG/1
10 CAPSULE ORAL
Qty: 30 CAPSULE | Refills: 0 | Status: SHIPPED | OUTPATIENT
Start: 2025-06-13

## 2025-05-13 RX ORDER — OMEPRAZOLE 20 MG/1
20 CAPSULE, DELAYED RELEASE ORAL DAILY
Qty: 90 CAPSULE | Refills: 3 | Status: SHIPPED | OUTPATIENT
Start: 2025-05-13

## 2025-05-13 RX ORDER — LISDEXAMFETAMINE DIMESYLATE 20 MG/1
20 CAPSULE ORAL EVERY MORNING
Qty: 30 CAPSULE | Refills: 0 | Status: SHIPPED | OUTPATIENT
Start: 2025-05-13

## 2025-05-19 ENCOUNTER — APPOINTMENT (OUTPATIENT)
Dept: GENERAL RADIOLOGY | Facility: CLINIC | Age: 30
End: 2025-05-19
Attending: INTERNAL MEDICINE
Payer: COMMERCIAL

## 2025-05-19 ENCOUNTER — ANESTHESIA (OUTPATIENT)
Dept: SURGERY | Facility: CLINIC | Age: 30
End: 2025-05-19
Payer: COMMERCIAL

## 2025-05-19 ENCOUNTER — HOSPITAL ENCOUNTER (OUTPATIENT)
Facility: CLINIC | Age: 30
Discharge: HOME | End: 2025-05-19
Attending: INTERNAL MEDICINE | Admitting: INTERNAL MEDICINE
Payer: COMMERCIAL

## 2025-05-19 VITALS
HEIGHT: 63 IN | WEIGHT: 133.6 LBS | DIASTOLIC BLOOD PRESSURE: 78 MMHG | RESPIRATION RATE: 14 BRPM | BODY MASS INDEX: 23.67 KG/M2 | HEART RATE: 59 BPM | SYSTOLIC BLOOD PRESSURE: 113 MMHG | OXYGEN SATURATION: 100 % | TEMPERATURE: 97.7 F

## 2025-05-19 LAB
ALBUMIN SERPL BCG-MCNC: 4.3 G/DL (ref 3.5–5.2)
ALP SERPL-CCNC: 94 U/L (ref 40–150)
ALT SERPL W P-5'-P-CCNC: 19 U/L (ref 0–50)
ANION GAP SERPL CALCULATED.3IONS-SCNC: 12 MMOL/L (ref 7–15)
AST SERPL W P-5'-P-CCNC: 26 U/L (ref 0–45)
BILIRUB SERPL-MCNC: 0.3 MG/DL
BUN SERPL-MCNC: 9.9 MG/DL (ref 6–20)
CALCIUM SERPL-MCNC: 9.2 MG/DL (ref 8.8–10.4)
CHLORIDE SERPL-SCNC: 104 MMOL/L (ref 98–107)
CREAT SERPL-MCNC: 0.79 MG/DL (ref 0.51–0.95)
EGFRCR SERPLBLD CKD-EPI 2021: >90 ML/MIN/1.73M2
ERCP: NORMAL
ERYTHROCYTE [DISTWIDTH] IN BLOOD BY AUTOMATED COUNT: 15.2 % (ref 10–15)
GLUCOSE SERPL-MCNC: 110 MG/DL (ref 70–99)
HCO3 SERPL-SCNC: 24 MMOL/L (ref 22–29)
HCT VFR BLD AUTO: 38.6 % (ref 35–47)
HGB BLD-MCNC: 12.1 G/DL (ref 11.7–15.7)
INR PPP: 0.97 (ref 0.85–1.15)
LIPASE SERPL-CCNC: 23 U/L (ref 13–60)
MCH RBC QN AUTO: 27.6 PG (ref 26.5–33)
MCHC RBC AUTO-ENTMCNC: 31.3 G/DL (ref 31.5–36.5)
MCV RBC AUTO: 88 FL (ref 78–100)
PLATELET # BLD AUTO: 201 10E3/UL (ref 150–450)
POTASSIUM SERPL-SCNC: 4.3 MMOL/L (ref 3.4–5.3)
PROT SERPL-MCNC: 7.2 G/DL (ref 6.4–8.3)
PROTHROMBIN TIME: 13.2 SECONDS (ref 11.8–14.8)
RBC # BLD AUTO: 4.38 10E6/UL (ref 3.8–5.2)
SODIUM SERPL-SCNC: 140 MMOL/L (ref 135–145)
WBC # BLD AUTO: 8.2 10E3/UL (ref 4–11)

## 2025-05-19 PROCEDURE — C2617 STENT, NON-COR, TEM W/O DEL: HCPCS | Performed by: INTERNAL MEDICINE

## 2025-05-19 PROCEDURE — 250N000009 HC RX 250: Performed by: INTERNAL MEDICINE

## 2025-05-19 PROCEDURE — 272N000001 HC OR GENERAL SUPPLY STERILE: Performed by: INTERNAL MEDICINE

## 2025-05-19 PROCEDURE — 250N000009 HC RX 250: Performed by: NURSE ANESTHETIST, CERTIFIED REGISTERED

## 2025-05-19 PROCEDURE — C1726 CATH, BAL DIL, NON-VASCULAR: HCPCS | Performed by: INTERNAL MEDICINE

## 2025-05-19 PROCEDURE — 83690 ASSAY OF LIPASE: CPT | Performed by: INTERNAL MEDICINE

## 2025-05-19 PROCEDURE — 360N000082 HC SURGERY LEVEL 2 W/ FLUORO, PER MIN: Performed by: INTERNAL MEDICINE

## 2025-05-19 PROCEDURE — 85018 HEMOGLOBIN: CPT | Performed by: INTERNAL MEDICINE

## 2025-05-19 PROCEDURE — 360N000083 HC SURGERY LEVEL 3 W/ FLUORO, PER MIN: Performed by: INTERNAL MEDICINE

## 2025-05-19 PROCEDURE — 710N000010 HC RECOVERY PHASE 1, LEVEL 2, PER MIN: Performed by: INTERNAL MEDICINE

## 2025-05-19 PROCEDURE — 999N000141 HC STATISTIC PRE-PROCEDURE NURSING ASSESSMENT: Performed by: INTERNAL MEDICINE

## 2025-05-19 PROCEDURE — 85610 PROTHROMBIN TIME: CPT | Performed by: INTERNAL MEDICINE

## 2025-05-19 PROCEDURE — 250N000025 HC SEVOFLURANE, PER MIN: Performed by: INTERNAL MEDICINE

## 2025-05-19 PROCEDURE — 82247 BILIRUBIN TOTAL: CPT | Performed by: INTERNAL MEDICINE

## 2025-05-19 PROCEDURE — 36415 COLL VENOUS BLD VENIPUNCTURE: CPT | Performed by: INTERNAL MEDICINE

## 2025-05-19 PROCEDURE — 250N000011 HC RX IP 250 OP 636: Mod: JZ | Performed by: NURSE ANESTHETIST, CERTIFIED REGISTERED

## 2025-05-19 PROCEDURE — 258N000003 HC RX IP 258 OP 636: Performed by: INTERNAL MEDICINE

## 2025-05-19 PROCEDURE — 710N000012 HC RECOVERY PHASE 2, PER MINUTE: Performed by: INTERNAL MEDICINE

## 2025-05-19 PROCEDURE — 370N000017 HC ANESTHESIA TECHNICAL FEE, PER MIN: Performed by: INTERNAL MEDICINE

## 2025-05-19 PROCEDURE — 258N000003 HC RX IP 258 OP 636: Performed by: NURSE ANESTHETIST, CERTIFIED REGISTERED

## 2025-05-19 PROCEDURE — 999N000181 XR SURGERY CARM FLUORO GREATER THAN 5 MIN W STILLS

## 2025-05-19 PROCEDURE — 255N000002 HC RX 255 OP 636: Performed by: INTERNAL MEDICINE

## 2025-05-19 PROCEDURE — C1769 GUIDE WIRE: HCPCS | Performed by: INTERNAL MEDICINE

## 2025-05-19 DEVICE — ZIMMON PANCREATIC STENT
Type: IMPLANTABLE DEVICE | Site: PANCREATIC DUCT | Status: FUNCTIONAL
Brand: ZIMMON

## 2025-05-19 RX ORDER — NALOXONE HYDROCHLORIDE 0.4 MG/ML
0.1 INJECTION, SOLUTION INTRAMUSCULAR; INTRAVENOUS; SUBCUTANEOUS
Status: DISCONTINUED | OUTPATIENT
Start: 2025-05-19 | End: 2025-05-19 | Stop reason: HOSPADM

## 2025-05-19 RX ORDER — NALOXONE HYDROCHLORIDE 0.4 MG/ML
0.2 INJECTION, SOLUTION INTRAMUSCULAR; INTRAVENOUS; SUBCUTANEOUS
Status: DISCONTINUED | OUTPATIENT
Start: 2025-05-19 | End: 2025-05-19 | Stop reason: HOSPADM

## 2025-05-19 RX ORDER — LIDOCAINE 40 MG/G
CREAM TOPICAL
Status: DISCONTINUED | OUTPATIENT
Start: 2025-05-19 | End: 2025-05-19 | Stop reason: HOSPADM

## 2025-05-19 RX ORDER — SODIUM CHLORIDE, SODIUM LACTATE, POTASSIUM CHLORIDE, CALCIUM CHLORIDE 600; 310; 30; 20 MG/100ML; MG/100ML; MG/100ML; MG/100ML
INJECTION, SOLUTION INTRAVENOUS CONTINUOUS PRN
Status: DISCONTINUED | OUTPATIENT
Start: 2025-05-19 | End: 2025-05-19

## 2025-05-19 RX ORDER — NALOXONE HYDROCHLORIDE 0.4 MG/ML
0.4 INJECTION, SOLUTION INTRAMUSCULAR; INTRAVENOUS; SUBCUTANEOUS
Status: DISCONTINUED | OUTPATIENT
Start: 2025-05-19 | End: 2025-05-19 | Stop reason: HOSPADM

## 2025-05-19 RX ORDER — FENTANYL CITRATE 50 UG/ML
25 INJECTION, SOLUTION INTRAMUSCULAR; INTRAVENOUS EVERY 5 MIN PRN
Status: DISCONTINUED | OUTPATIENT
Start: 2025-05-19 | End: 2025-05-19 | Stop reason: HOSPADM

## 2025-05-19 RX ORDER — ONDANSETRON 2 MG/ML
4 INJECTION INTRAMUSCULAR; INTRAVENOUS EVERY 6 HOURS PRN
Status: DISCONTINUED | OUTPATIENT
Start: 2025-05-19 | End: 2025-05-19 | Stop reason: HOSPADM

## 2025-05-19 RX ORDER — HYDROMORPHONE HCL IN WATER/PF 6 MG/30 ML
0.2 PATIENT CONTROLLED ANALGESIA SYRINGE INTRAVENOUS EVERY 5 MIN PRN
Status: DISCONTINUED | OUTPATIENT
Start: 2025-05-19 | End: 2025-05-19 | Stop reason: HOSPADM

## 2025-05-19 RX ORDER — ONDANSETRON 4 MG/1
4 TABLET, ORALLY DISINTEGRATING ORAL EVERY 6 HOURS PRN
Status: DISCONTINUED | OUTPATIENT
Start: 2025-05-19 | End: 2025-05-19 | Stop reason: HOSPADM

## 2025-05-19 RX ORDER — ONDANSETRON 2 MG/ML
INJECTION INTRAMUSCULAR; INTRAVENOUS PRN
Status: DISCONTINUED | OUTPATIENT
Start: 2025-05-19 | End: 2025-05-19

## 2025-05-19 RX ORDER — FENTANYL CITRATE 50 UG/ML
INJECTION, SOLUTION INTRAMUSCULAR; INTRAVENOUS PRN
Status: DISCONTINUED | OUTPATIENT
Start: 2025-05-19 | End: 2025-05-19

## 2025-05-19 RX ORDER — PROPOFOL 10 MG/ML
INJECTION, EMULSION INTRAVENOUS PRN
Status: DISCONTINUED | OUTPATIENT
Start: 2025-05-19 | End: 2025-05-19

## 2025-05-19 RX ORDER — INDOMETHACIN 50 MG/1
SUPPOSITORY RECTAL PRN
Status: DISCONTINUED | OUTPATIENT
Start: 2025-05-19 | End: 2025-05-19 | Stop reason: HOSPADM

## 2025-05-19 RX ORDER — CIPROFLOXACIN 2 MG/ML
INJECTION, SOLUTION INTRAVENOUS PRN
Status: DISCONTINUED | OUTPATIENT
Start: 2025-05-19 | End: 2025-05-19

## 2025-05-19 RX ORDER — ONDANSETRON 4 MG/1
4 TABLET, ORALLY DISINTEGRATING ORAL EVERY 30 MIN PRN
Status: DISCONTINUED | OUTPATIENT
Start: 2025-05-19 | End: 2025-05-19 | Stop reason: HOSPADM

## 2025-05-19 RX ORDER — SODIUM CHLORIDE, SODIUM LACTATE, POTASSIUM CHLORIDE, CALCIUM CHLORIDE 600; 310; 30; 20 MG/100ML; MG/100ML; MG/100ML; MG/100ML
INJECTION, SOLUTION INTRAVENOUS CONTINUOUS
Status: DISCONTINUED | OUTPATIENT
Start: 2025-05-19 | End: 2025-05-19 | Stop reason: HOSPADM

## 2025-05-19 RX ORDER — IOPAMIDOL 510 MG/ML
INJECTION, SOLUTION INTRAVASCULAR PRN
Status: DISCONTINUED | OUTPATIENT
Start: 2025-05-19 | End: 2025-05-19 | Stop reason: HOSPADM

## 2025-05-19 RX ORDER — OXYCODONE HYDROCHLORIDE 5 MG/1
5 TABLET ORAL
Status: DISCONTINUED | OUTPATIENT
Start: 2025-05-19 | End: 2025-05-19 | Stop reason: HOSPADM

## 2025-05-19 RX ORDER — ONDANSETRON 2 MG/ML
4 INJECTION INTRAMUSCULAR; INTRAVENOUS EVERY 30 MIN PRN
Status: DISCONTINUED | OUTPATIENT
Start: 2025-05-19 | End: 2025-05-19 | Stop reason: HOSPADM

## 2025-05-19 RX ORDER — LIDOCAINE HYDROCHLORIDE 20 MG/ML
INJECTION, SOLUTION INFILTRATION; PERINEURAL PRN
Status: DISCONTINUED | OUTPATIENT
Start: 2025-05-19 | End: 2025-05-19

## 2025-05-19 RX ORDER — PROCHLORPERAZINE MALEATE 5 MG/1
10 TABLET ORAL EVERY 6 HOURS PRN
Status: DISCONTINUED | OUTPATIENT
Start: 2025-05-19 | End: 2025-05-19 | Stop reason: HOSPADM

## 2025-05-19 RX ORDER — DEXAMETHASONE SODIUM PHOSPHATE 4 MG/ML
INJECTION, SOLUTION INTRA-ARTICULAR; INTRALESIONAL; INTRAMUSCULAR; INTRAVENOUS; SOFT TISSUE PRN
Status: DISCONTINUED | OUTPATIENT
Start: 2025-05-19 | End: 2025-05-19

## 2025-05-19 RX ORDER — FLUMAZENIL 0.1 MG/ML
0.2 INJECTION, SOLUTION INTRAVENOUS
Status: DISCONTINUED | OUTPATIENT
Start: 2025-05-19 | End: 2025-05-19 | Stop reason: HOSPADM

## 2025-05-19 RX ADMIN — Medication 50 MG: at 12:46

## 2025-05-19 RX ADMIN — FENTANYL CITRATE 50 MCG: 50 INJECTION INTRAMUSCULAR; INTRAVENOUS at 13:10

## 2025-05-19 RX ADMIN — PROPOFOL 120 MG: 10 INJECTION, EMULSION INTRAVENOUS at 12:46

## 2025-05-19 RX ADMIN — ONDANSETRON 4 MG: 2 INJECTION INTRAMUSCULAR; INTRAVENOUS at 13:51

## 2025-05-19 RX ADMIN — SODIUM CHLORIDE, SODIUM LACTATE, POTASSIUM CHLORIDE, AND CALCIUM CHLORIDE 1000 ML: .6; .31; .03; .02 INJECTION, SOLUTION INTRAVENOUS at 14:13

## 2025-05-19 RX ADMIN — DEXMEDETOMIDINE HYDROCHLORIDE 12 MCG: 100 INJECTION, SOLUTION INTRAVENOUS at 13:10

## 2025-05-19 RX ADMIN — FENTANYL CITRATE 50 MCG: 50 INJECTION INTRAMUSCULAR; INTRAVENOUS at 12:46

## 2025-05-19 RX ADMIN — CIPROFLOXACIN 400 MG: 400 INJECTION, SOLUTION INTRAVENOUS at 12:40

## 2025-05-19 RX ADMIN — LIDOCAINE HYDROCHLORIDE 100 MG: 20 INJECTION, SOLUTION INFILTRATION; PERINEURAL at 12:46

## 2025-05-19 RX ADMIN — DEXMEDETOMIDINE HYDROCHLORIDE 8 MCG: 100 INJECTION, SOLUTION INTRAVENOUS at 13:28

## 2025-05-19 RX ADMIN — DEXAMETHASONE SODIUM PHOSPHATE 8 MG: 4 INJECTION, SOLUTION INTRAMUSCULAR; INTRAVENOUS at 12:46

## 2025-05-19 RX ADMIN — Medication 100 MG: at 13:53

## 2025-05-19 RX ADMIN — GLUCAGON 0.4 MG: 1 INJECTION, POWDER, LYOPHILIZED, FOR SOLUTION INTRAMUSCULAR; INTRAVENOUS at 13:34

## 2025-05-19 RX ADMIN — MIDAZOLAM 2 MG: 1 INJECTION INTRAMUSCULAR; INTRAVENOUS at 12:35

## 2025-05-19 RX ADMIN — PHENYLEPHRINE HYDROCHLORIDE 100 MCG: 10 INJECTION INTRAVENOUS at 13:37

## 2025-05-19 RX ADMIN — SODIUM CHLORIDE, SODIUM LACTATE, POTASSIUM CHLORIDE, AND CALCIUM CHLORIDE: .6; .31; .03; .02 INJECTION, SOLUTION INTRAVENOUS at 12:35

## 2025-05-19 ASSESSMENT — ACTIVITIES OF DAILY LIVING (ADL)
ADLS_ACUITY_SCORE: 50

## 2025-05-19 NOTE — BRIEF OP NOTE
[facility]    Brief Operative Note    Pre-operative diagnosis: Pancreatic duct stones [K86.89]  Post-operative diagnosis Same as pre-operative diagnosis    Procedure: ENDOSCOPIC RETROGRADE CHOLANGIOPANCREATOGRAPHY WITH PANCREATIC DEBRIS AND STONE REMOVAL, AND PANCREATIC STENT EXCHANGE., N/A - Mouth    Surgeon: Surgeons and Role:     * Guru Elena Ortiz MD - Primary     * Sonya Langley MD - Fellow - Assisting  Anesthesia: General   Estimated Blood Loss: None    Drains: None  Specimens: * No specimens in log *    Findings:     - Previously placed cystogastrostomy stents were in good position. The pancreatic stent had migrated distally and successfully removed.   - Selective pancreatic cannulation   - Evidence of large irregular pancreatic duct with prominent side branches and mild/moderate stricture in the head of the pancreas with possible stone. There was no evidence of main PD leak and the duct in the tail did not opacify suggestive of possible disconnected duct   - Removal of debris and small stones from the main PD using basket and balloon catheters   - Placement of 5 Fr x  7 cm ZIMMON single pigtailed stent in the main PD     Recommendations:   - Observe patient in PACU prior anticipated home discharge   - Clear liquid diet   - Advance diet as tolerated   - ERCP in 4-6 week for stent removal/exchange and possible SPYglass pancreatoscopy to evaluate the main PD stricture and rule out any main duct stones   - If patient develops fever, chills, or worsening abdominal pain or pancreatitis before scheduled ERCP, will recommend patient to call us immediately for consideration of an earlier urgent ERCP   - The findings and recommendations were discussed with the patient.     Complications: None.  Implants:   Implant Name Type Inv. Item Serial No.  Lot No. LRB No. Used Action   STENT ZIMMON PANCREA [id number] SGL PIGTAIL [id number] -  [id number] Stent STENT ZIMMON PANCREA [id number] SGL PIGTAIL [id number]  [facility] [employer] [id number] N/A 1 Explanted   STENT ZIMMON PANCREA [id number] SGL PIGTAIL [id number] - [id number] Stent STENT ZIMMON PANCREA [id number] SGL PIGTAIL [id number]  [employer] [id number] N/A 1 Implanted

## 2025-05-19 NOTE — PROGRESS NOTES
Pre-procedure note:     29 year old female with a PMH of chronic calcific pancreatitis, complicated by pseudocyst and pseudoaneurysm s/p coiling embolization was referred by interventional radiology for further evaluation for possible endoscopic transluminal drainage of pseudocyst. Fluid collection appears and she underwent EUS (2/3/25) that showed a 11 cm pseudocyst and 1.4 cm perigastric intramural collection. EUS FNA alone was performed with drainage of 200 ml of fluid. Multiple PD stones were seen in pancreas head. ERCP revealed ventral PD changes of chronic pancreatitis and possible small PD leak in the body/tail of the pancreas s/p pancreatic sphincterotomy and placement of 5 Fr x 10 cm single pigtailed ZIMMON stent. Biliary sphincterotomy was also performed and complicated by sphincterotomy bleeding s/p topical epinephrine and 8 x 60 mm Viabil FCMS. The FCSEMS was later removed and she was noted to have large loculated heterogenous peripancreatic fluid collection. She underwent cystogastrostomy with AXIOS and two coaxial (Viabil and DPS) stents placement. The stent were later removed and two double pigtailed plastic stents were placed across the cystogastrostomy. She is coming back for ERCP for evaluation of pancreatic duct.       Ref: POLY KNIGHT MD

## 2025-05-19 NOTE — ANESTHESIA CARE TRANSFER NOTE
Patient: Rani Lisa    Procedure: Procedure(s):  ENDOSCOPIC RETROGRADE CHOLANGIOPANCREATOGRAPHY WITH PANCREATIC DEBRIS AND STONE REMOVAL, AND PANCREATIC STENT EXCHANGE.       Diagnosis: Pancreatic duct stones [K86.89]  Diagnosis Additional Information: No value filed.    Anesthesia Type:   General     Note:    Oropharynx: oropharynx clear of all foreign objects  Level of Consciousness: drowsy  Oxygen Supplementation: face mask  Level of Supplemental Oxygen (L/min / FiO2): 10  Independent Airway: airway patency satisfactory and stable  Dentition: dentition unchanged  Vital Signs Stable: post-procedure vital signs reviewed and stable  Report to RN Given: handoff report given  Patient transferred to: PACU    Handoff Report: Identifed the Patient, Identified the Reponsible Provider, Reviewed the pertinent medical history, Discussed the surgical course, Reviewed Intra-OP anesthesia mangement and issues during anesthesia, Set expectations for post-procedure period and Allowed opportunity for questions and acknowledgement of understanding      Vitals:  Vitals Value Taken Time   /78 05/19/25 14:03   Temp     Pulse 82 05/19/25 14:06   Resp 21 05/19/25 14:06   SpO2 95 % 05/19/25 14:06   Vitals shown include unfiled device data.    Electronically Signed By: SIMON Everett CRNA  May 19, 2025  2:06 PM

## 2025-05-19 NOTE — ANESTHESIA PROCEDURE NOTES
Airway       Patient location during procedure: OR       Procedure Start/Stop Times: 5/19/2025 12:48 PM  Staff -        CRNA: Bonnie Pedroza APRN CRNA       Performed By: anesthesiologist and CRNAIndications and Patient Condition       Indications for airway management: romy-procedural       Induction type:intravenous       Mask difficulty assessment: 1 - vent by mask    Final Airway Details       Final airway type: endotracheal airway       Successful airway: ETT - single  Endotracheal Airway Details        ETT size (mm): 7.0       Cuffed: yes       Successful intubation technique: direct laryngoscopy       DL Blade Type: Gallagher 2       Grade View of Cords: 1       Adjucts: stylet       Position: Right       Measured from: lips       Secured at (cm): 21       Bite block used: None    Post intubation assessment        Placement verified by: capnometry, equal breath sounds and chest rise        Number of attempts at approach: 1       Number of other approaches attempted: 0       Secured with: tape       Ease of procedure: easy       Dentition: Intact       Dental guard used and removed. Dental Guard Type: Standard White.    Medication(s) Administered   Medication Administration Time: 5/19/2025 12:48 PM

## 2025-05-19 NOTE — DISCHARGE INSTRUCTIONS
- ERCP in 4-6 week for stent removal/exchange and possible SPYglass pancreatoscopy to evaluate the main PD stricture and rule out any main duct stones   - If patient develops fever, chills, or worsening abdominal pain or pancreatitis before scheduled ERCP, will recommend patient to call us immediately for consideration of an earlier urgent ERCP     Contacting your Doctor -   To contact a doctor, call Dr. Ortiz at the [facility] @ [phone] (call second) -[phone] (call first) or:  [phone] and ask for the resident on call for Gastroenterology (answered 24 hours a day)   Emergency Department:  [facility]: [phone] 911 if you are in need of immediate or emergent help    
drainage begins to resolve.   Do not blow your nose for 2 weeks after surgery.  Avoid lifting > 10 lbs. and no vigorous exercise for 2 weeks after surgery.  Avoid airplane travel for 2 weeks following sinus surgery; the cabin pressure changes can cause pain and swelling within the nose/sinuses.  Sense of smell and taste are often diminished for several weeks after surgery. There may be some tenderness or numbness in your upper front teeth, which is normal after surgery. You may express old clot, discolored mucus or very large nasal crusts from your nose for up to 3-4 weeks after surgery; depending on how frequently and how effectively you irrigate your nose with the saltwater spray.  You may have absorbable sutures inside of your nose after surgery that will slowly dissolve in 2-3 weeks. Be careful when clearing crusts from the nose since they may be attached to these sutures.    Medications  Pain medication can be used for pain as prescribed. Pain and pressure in the nose is expected after surgery. As the surgical site heals, pain will resolve over the course of a week. Pain medications can cause nausea, which can be prevented if you take them with food or milk.  You will be given an antibiotic for one week after surgery to treat any existing and prevent further infection. Take this medication with food to prevent nausea or vomiting.  You can use 2 nasal sprays after surgery: Afrin can be used up to 2 times a day for up to 5 days after surgery (best before bed) to reduce bloody drainage from the nose for the first few days after surgery.   Take all of your routine medications as prescribed, unless told otherwise by your surgeon. Any medications that thin the blood should be avoided. These include aspirin and aspirin-like products (Advil, Motrin, Excedrin, Alieve, Celebrex, Naprosyn).  SINUS IRRIGATIONS INSTRUCTIONS    Started the day after sinus surgery use sinus irrigations at least 3 times per day (full bottle).

## 2025-05-19 NOTE — ANESTHESIA PREPROCEDURE EVALUATION
Anesthesia Pre-Procedure Evaluation    Patient: Rani Lisa   MRN: [id number] : 1995          Procedure : Procedure(s):  ENDOSCOPIC RETROGRADE CHOLANGIOPANCREATOGRAPHY         Past Medical History:   Diagnosis Date    Abdominal fluid collection     ADHD (attention deficit hyperactivity disorder)     Alcohol dependence (H)     Alcoholic pancreatitis     Depression     Hyperglycemia     Hypomagnesemia     Hyponatremia     Leukocytosis     Migraine     Nexplanon insertion     Pancreatic duct stones     Pancreatic duct stricture       Past Surgical History:   Procedure Laterality Date    COLPOSCOPY      ENDOSCOPIC RETROGRADE CHOLANGIOPANCREATOGRAM N/A 2/3/2025    Procedure: ENDOSCOPIC RETROGRADE CHOLANGIOPANCREATOGRAPHY WITH BILIARY SPHINCTEROTOMY AND STENT PLACEMENT, PANCREATIC DUCT SPHINCTEROTOMY, STONE REMOVAL, STENT PLACEMENT;  Surgeon: Guru Elena Ortiz MD;  Location: [facility] OR    ENDOSCOPIC RETROGRADE CHOLANGIOPANCREATOGRAM N/A 3/3/2025    Procedure: ENDOSCOPIC RETROGRADE CHOLANGIOPANCREATOGRAPHY, WITH Pancreatic Stent Exchange, Biliary Stent Removal, and sludge removal.;  Surgeon: Guru Elena Ortiz MD;  Location: [facility] OR    ESOPHAGOSCOPY, GASTROSCOPY, DUODENOSCOPY (EGD), COMBINED N/A 2/3/2025    Procedure: ESOPHAGOGASTRODUODENOSCOPY, WITH FINE NEEDLE ASPIRATION BIOPSY, WITH ENDOSCOPIC ULTRASOUND GUIDANCE;  Surgeon: Guru Elena Ortiz MD;  Location: [facility] OR    ESOPHAGOSCOPY, GASTROSCOPY, DUODENOSCOPY (EGD), COMBINED N/A 3/3/2025    Procedure: Endoscopic ultrasound upper gastrointestinal tract (GI) with fine needle aspiration and cystgastrostomy stent placement.;  Surgeon: Guru Elena Ortiz MD;  Location: [facility] OR    ESOPHAGOSCOPY, GASTROSCOPY, DUODENOSCOPY (EGD), COMBINED N/A 3/12/2025    Procedure: ESOPHAGOGASTRODUODENOSCOPY WITH CYSTGASTROSTOMY STENT EXCHANGE AND PUS REMOVAL;  Surgeon: Guru Elena Ortiz MD;   Location: [facility] OR    IR VISCERAL ANGIOGRAM  01/19/2024    TONSILLECTOMY & ADENOIDECTOMY        Allergies   Allergen Reactions    Bactrim [Sulfamethoxazole-Trimethoprim] Rash      Social History     Tobacco Use    Smoking status: Every Day     Current packs/day: 0.50     Types: Cigarettes    Smokeless tobacco: Never    Tobacco comments:     3-5 cpd   Substance Use Topics    Alcohol use: Not Currently      Wt Readings from Last 1 Encounters:   05/08/25 62.1 kg (137 lb)        Anesthesia Evaluation   Pt has had prior anesthetic. Type: General.        ROS/MED HX  ENT/Pulmonary:       Neurologic:     (+)      migraines,                          Cardiovascular:       METS/Exercise Tolerance: 4 - Raking leaves, gardening    Hematologic:       Musculoskeletal:       GI/Hepatic: Comment: Alcoholic pancreatitis      Renal/Genitourinary:     (+) renal disease,             Endo:       Psychiatric/Substance Use:     (+) psychiatric history anxiety and depression   Recreational drug usage: Cannabis.    Infectious Disease:       Malignancy:       Other:              Physical Exam  Airway  Mallampati: II  TM distance: >3 FB  Neck ROM: full  Upper bite lip test: III  Mouth opening: >= 4 cm    Cardiovascular - normal exam   Dental   (+) Minor Abnormalities - some fillings, tiny chips        Pulmonary - normal examBreath sounds clear to auscultation        Neurological - normal exam  She appears awake, alert and oriented x3.    Other Findings       OUTSIDE LABS:  CBC:   Lab Results   Component Value Date    WBC 9.0 03/04/2025    WBC 6.0 03/04/2025    HGB 10.0 (L) 03/04/2025    HGB 7.2 (L) 03/04/2025    HCT 32.3 (L) 03/04/2025    HCT 22.5 (L) 03/04/2025     03/04/2025     03/04/2025     BMP:   Lab Results   Component Value Date     03/04/2025     03/04/2025    POTASSIUM 3.8 03/04/2025    POTASSIUM 4.1 03/04/2025    CHLORIDE 101 03/04/2025    CHLORIDE 103 03/04/2025    CO2 26 03/04/2025    CO2 14 (L) 03/04/2025  "   BUN 8.9 03/04/2025    BUN 5.7 (L) 03/04/2025    CR 0.74 03/04/2025    CR 0.40 (L) 03/04/2025     (H) 03/04/2025    GLC 55 (L) 03/04/2025     COAGS:   Lab Results   Component Value Date    PTT 28 01/19/2024    INR 1.03 03/03/2025     POC: No results found for: \"BGM\", \"HCG\", \"HCGS\"  HEPATIC:   Lab Results   Component Value Date    ALBUMIN 1.6 (L) 03/04/2025    PROTTOTAL 3.0 (L) 03/04/2025    ALT 6 03/04/2025    AST 8 03/04/2025    ALKPHOS 48 03/04/2025    BILITOTAL <0.2 03/04/2025     OTHER:   Lab Results   Component Value Date    LACT 0.7 01/19/2024    DUSTIN 8.7 (L) 03/04/2025    PHOS 4.7 (H) 01/19/2024    MAG 1.9 01/24/2024    LIPASE 85 (H) 03/03/2025    AMYLASE 109 (H) 02/03/2025    TSH 17.50 (H) 01/22/2024    T4 1.19 01/22/2024       Anesthesia Plan    ASA Status:  3       Anesthesia Type: General.  Airway: oral.  Induction: intravenous.  Maintenance: Balanced.   Techniques and Equipment:     - Airway:  Planned airway equipment includes video laryngoscope.     - Monitoring Plan: standard ASA monitoring, train of four monitoring     Consents    Anesthesia Plan(s) and associated risks, benefits, and realistic alternatives discussed. Questions answered and patient/representative(s) expressed understanding.     - Discussed: CRNA     - Discussed with:  Patient        - Pt is DNR/DNI Status: no DNR     Blood Consent:      - Discussed with: patient.     - Consented: consented to blood products     Postoperative Care    Pain management: non-narcotic analgesics.     Comments:                   Nikolas Cavazos MD    I have reviewed the pertinent notes and labs in the chart from the past 30 days and (re)examined the patient.  Any updates or changes from those notes are reflected in this note.    Clinically Significant Risk Factors Present on Admission                                 # Financial/Environmental Concerns:                 "

## 2025-05-19 NOTE — ANESTHESIA POSTPROCEDURE EVALUATION
Patient: Rani Lisa    Procedure: Procedure(s):  ENDOSCOPIC RETROGRADE CHOLANGIOPANCREATOGRAPHY WITH PANCREATIC DEBRIS AND STONE REMOVAL, AND PANCREATIC STENT EXCHANGE.       Anesthesia Type:  General    Note:  Disposition: Outpatient   Postop Pain Control: Uneventful            Sign Out: Well controlled pain   PONV: No   Neuro/Psych: Uneventful            Sign Out: Acceptable/Baseline neuro status   Airway/Respiratory: Uneventful            Sign Out: Acceptable/Baseline resp. status   CV/Hemodynamics: Uneventful            Sign Out: Acceptable CV status; No obvious hypovolemia; No obvious fluid overload   Other NRE: NONE   DID A NON-ROUTINE EVENT OCCUR? No           Last vitals:  Vitals Value Taken Time   /77 05/19/25 14:15   Temp 36.8  C (98.2  F) 05/19/25 14:00   Pulse 78 05/19/25 14:27   Resp 11 05/19/25 14:27   SpO2 99 % 05/19/25 14:27   Vitals shown include unfiled device data.    Electronically Signed By: Vivien Salcedo MD  May 19, 2025  2:27 PM

## 2025-06-02 ENCOUNTER — PREP FOR PROCEDURE (OUTPATIENT)
Dept: GASTROENTEROLOGY | Facility: CLINIC | Age: 30
End: 2025-06-02
Payer: COMMERCIAL

## 2025-06-02 ENCOUNTER — CARE COORDINATION (OUTPATIENT)
Dept: GASTROENTEROLOGY | Facility: CLINIC | Age: 30
End: 2025-06-02
Payer: COMMERCIAL

## 2025-06-02 DIAGNOSIS — K86.89 PANCREATIC DUCT STRICTURE: Primary | ICD-10-CM

## 2025-06-02 DIAGNOSIS — Z01.818 PRE-OP EXAM: Primary | ICD-10-CM

## 2025-06-02 DIAGNOSIS — K86.89 PANCREATIC DUCT STONES: ICD-10-CM

## 2025-06-02 NOTE — PROGRESS NOTES
Patient requests to schedule ERCP for 6/23/25. PAC referral sent.     Freda Davison, ERIN Care Coordinator

## 2025-06-02 NOTE — PROGRESS NOTES
Post ERCP on 25 with Dr. Ortiz.      Follow-up recommendations:                        - ERCP in 4-6 weeks for stent removal/exchange and                          possible SPYglass pancreatoscopy to evaluate the main                          PD stricture and rule out any main duct stones                          - If patient develops fever, chills, or worsening                          abdominal pain or pancreatitis before scheduled ERCP,                          will recommend patient to call us immediately for                          consideration of an earlier urgent ERCP     Orders placed:   Please assist in scheduling:     Procedure/Imaging/Clinic: Endoscopic Retrograde Cholangiopancreatography (ERCP) with abigailygradha   Physician: Angel  Timin-6 weeks  Scope time: Provider average   Anesthesia: General  Dx: Pancreatic duct stricture; pancreatic duct stones  Tier:3  Location: [facility]  OK to schedule while attending: Not specified by provider   Patient communication letter header:Endoscopic Retrograde Cholangiopancreatography (ERCP)     Procedure due approximately 25 (4 weeks) [employer] message sent to communicate available dates with patient.     Preop Plan: PAC referral    Med Review    Blood thinner -  None  ASA - None  Diabetic - None   Injectable or oral medications for weight loss - None    Patient Education r/t procedure: Alvaro Davison RN Care Coordinator

## 2025-06-02 NOTE — PROGRESS NOTES
Please assist in scheduling:     Procedure/Imaging/Clinic: Endoscopic Retrograde Cholangiopancreatography (ERCP) with laureen   Physician: Angel  Timin-6 weeks  Scope time: Provider average   Anesthesia: General  Dx: Pancreatic duct stricture; pancreatic duct stones  Tier:3  Location: [facility]  OK to schedule while attending: Not specified by provider   Patient communication letter header:Endoscopic Retrograde Cholangiopancreatography (ERCP)    - - -

## 2025-06-09 NOTE — TELEPHONE ENCOUNTER
FUTURE VISIT INFORMATION      SURGERY INFORMATION:  Date: 25  Location: [facility] OR  Surgeon:  Guru Elena Ortiz MD   Anesthesia Type:  General  Procedure: Endoscopic Retrograde Cholangiopancreatography (ERCP) with spyglass   Consult: 25    RECORDS REQUESTED FROM:       Primary Care Provider: Dodie Lofton MD    Pertinent Medical History: hx tobacco use, Alcohol dependence, Alcoholic pancreatitis, Hyperglycemia, Hypomagnesemia, Hyponatremia, Leukocytosis, Gastritis, Marijuana use, continuous, HPV      Most recent EKG+ Tracin24, internal    Most recent Coronary Angiogram: 24, internal

## 2025-06-11 ENCOUNTER — PRE VISIT (OUTPATIENT)
Dept: SURGERY | Facility: CLINIC | Age: 30
End: 2025-06-11

## 2025-06-11 ENCOUNTER — VIRTUAL VISIT (OUTPATIENT)
Dept: SURGERY | Facility: CLINIC | Age: 30
End: 2025-06-11
Attending: INTERNAL MEDICINE
Payer: COMMERCIAL

## 2025-06-11 ENCOUNTER — ANESTHESIA EVENT (OUTPATIENT)
Dept: SURGERY | Facility: CLINIC | Age: 30
End: 2025-06-11
Payer: COMMERCIAL

## 2025-06-11 VITALS — BODY MASS INDEX: 23.74 KG/M2 | WEIGHT: 134 LBS | HEIGHT: 63 IN

## 2025-06-11 DIAGNOSIS — Z01.818 PRE-OP EVALUATION: Primary | ICD-10-CM

## 2025-06-11 DIAGNOSIS — Z01.818 PRE-OP EXAM: ICD-10-CM

## 2025-06-11 ASSESSMENT — ENCOUNTER SYMPTOMS
SEIZURES: 0
DYSRHYTHMIAS: 0

## 2025-06-11 ASSESSMENT — LIFESTYLE VARIABLES: TOBACCO_USE: 1

## 2025-06-11 NOTE — H&P
Pre-Operative H & P     CC:  Preoperative exam to assess for increased cardiopulmonary risk while undergoing surgery and anesthesia.    Date of Encounter: 6/11/2025  Primary Care Physician:  Dodie Lofton     Reason for visit:   Encounter Diagnoses   Name Primary?    Pre-op evaluation Yes    Pre-op exam        HPI  Rani Lisa is a 29 year old female who presents for pre-operative H & P in preparation for  Procedure Information       Case: [id number] Date/Time: 06/23/25 1115    Procedure: Endoscopic Retrograde Cholangiopancreatography (ERCP) with spyglass (Mouth)    Anesthesia type: General    Diagnosis:       Pancreatic duct stricture [K86.89]      Pancreatic duct stones [K86.89]    Pre-op diagnosis:       Pancreatic duct stricture [K86.89]      Pancreatic duct stones [K86.89]    [facility]    Providers: Guru Elena Ortiz MD            Patient is being evaluated for comorbid conditions of ADHD, h/o alcohol dependence, migraines    Ms. 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  She was referred to Dr. Ortiz on 1/9/2025, for further evaluation and management. She is s/p multiple ERCPs, last on 5/19/25. Above procedure now planned.     History is obtained from the patient and chart review    Hx of abnormal bleeding or anti-platelet use: denies    Menstrual history: Patient's last menstrual period was 05/26/2025 (approximate).     Past Medical History  Past Medical History:   Diagnosis Date    Abdominal fluid collection     ADHD (attention deficit hyperactivity disorder)     Alcohol dependence (H)     Alcoholic pancreatitis     Depression     Hyperglycemia     Hypomagnesemia     Hyponatremia     Leukocytosis     Migraine     Nexplanon insertion     Pancreatic duct stones     Pancreatic duct stricture        Past Surgical History  Past Surgical  History:   Procedure Laterality Date    COLPOSCOPY      ENDOSCOPIC RETROGRADE CHOLANGIOPANCREATOGRAM N/A 2/3/2025    Procedure: ENDOSCOPIC RETROGRADE CHOLANGIOPANCREATOGRAPHY WITH BILIARY SPHINCTEROTOMY AND STENT PLACEMENT, PANCREATIC DUCT SPHINCTEROTOMY, STONE REMOVAL, STENT PLACEMENT;  Surgeon: Guru Elena Ortiz MD;  [facility]    ENDOSCOPIC RETROGRADE CHOLANGIOPANCREATOGRAM N/A 3/3/2025    Procedure: ENDOSCOPIC RETROGRADE CHOLANGIOPANCREATOGRAPHY, WITH Pancreatic Stent Exchange, Biliary Stent Removal, and sludge removal.;  Surgeon: Guru Elena Ortiz MD;  [facility]    ENDOSCOPIC RETROGRADE CHOLANGIOPANCREATOGRAPHY, EXCHANGE TUBE/STENT N/A 5/19/2025    Procedure: ENDOSCOPIC RETROGRADE CHOLANGIOPANCREATOGRAPHY WITH PANCREATIC DEBRIS AND STONE REMOVAL, AND PANCREATIC STENT EXCHANGE.;  Surgeon: Guru Elena Ortiz MD;  [facility]    ESOPHAGOSCOPY, GASTROSCOPY, DUODENOSCOPY (EGD), COMBINED N/A 2/3/2025    Procedure: ESOPHAGOGASTRODUODENOSCOPY, WITH FINE NEEDLE ASPIRATION BIOPSY, WITH ENDOSCOPIC ULTRASOUND GUIDANCE;  Surgeon: Guru Elena Ortiz MD;  [facility]    ESOPHAGOSCOPY, GASTROSCOPY, DUODENOSCOPY (EGD), COMBINED N/A 3/3/2025    Procedure: Endoscopic ultrasound upper gastrointestinal tract (GI) with fine needle aspiration and cystgastrostomy stent placement.;  Surgeon: Guru Elena Ortiz MD;  [facility]    ESOPHAGOSCOPY, GASTROSCOPY, DUODENOSCOPY (EGD), COMBINED N/A 3/12/2025    Procedure: ESOPHAGOGASTRODUODENOSCOPY WITH CYSTGASTROSTOMY STENT EXCHANGE AND PUS REMOVAL;  Surgeon: Guru Elena Ortiz MD;  [facility]    IR VISCERAL ANGIOGRAM  01/19/2024    TONSILLECTOMY & ADENOIDECTOMY         Prior to Admission Medications  Current Outpatient Medications   Medication Sig Dispense Refill    lisdexamfetamine (VYVANSE) 10 MG capsule Take 1 capsule (10 mg) by mouth daily at 2 pm. 30  capsule 0    lisdexamfetamine (VYVANSE) 20 MG capsule Take 1 capsule (20 mg) by mouth every morning. 30 capsule 0    multivitamin w/minerals (MULTI-VITAMIN) tablet Take 1 tablet by mouth every morning.      omeprazole (PRILOSEC) 20 MG DR capsule Take 1 capsule (20 mg) by mouth daily. (Patient taking differently: Take 20 mg by mouth every morning.) 90 capsule 3    ondansetron (ZOFRAN ODT) 4 MG ODT tab Place 1 tablet (4 mg) under the tongue every 8 hours as needed for nausea. 30 tablet 0    [START ON 6/13/2025] lisdexamfetamine (VYVANSE) 10 MG capsule Take 1 capsule (10 mg) by mouth daily at 2 pm. 30 capsule 0    [START ON 6/13/2025] lisdexamfetamine (VYVANSE) 20 MG capsule Take 1 capsule (20 mg) by mouth every morning. 30 capsule 0       Allergies  Allergies   Allergen Reactions    Bactrim [Sulfamethoxazole-Trimethoprim] Rash       Social History  Social History     Socioeconomic History    Marital status:      Spouse name: Not on file    Number of children: Not on file    Years of education: Not on file    Highest education level: Not on file   Occupational History    Not on file   Tobacco Use    Smoking status: Every Day     Current packs/day: 0.50     Types: Cigarettes     Passive exposure: Current    Smokeless tobacco: Never    Tobacco comments:     3-5 cpd   Vaping Use    Vaping status: Every Day    Substances: Nicotine, Flavoring    Devices: Disposable   Substance and Sexual Activity    Alcohol use: Not Currently    Drug use: Yes     Types: Marijuana     Comment: daily marijuana smoking    Sexual activity: Not on file   Other Topics Concern    Not on file   Social History Narrative    Not on file     Social Drivers of Health     Financial Resource Strain: Low Risk  (3/4/2025)    Financial Resource Strain     Within the past 12 months, have you or your family members you live with been unable to get utilities (heat, electricity) when it was really needed?: No   Food Insecurity: Low Risk  (3/4/2025)     Food Insecurity     Within the past 12 months, did you worry that your food would run out before you got money to buy more?: No     Within the past 12 months, did the food you bought just not last and you didn t have money to get more?: No   Transportation Needs: Low Risk  (3/4/2025)    Transportation Needs     Within the past 12 months, has lack of transportation kept you from medical appointments, getting your medicines, non-medical meetings or appointments, work, or from getting things that you need?: No   Physical Activity: Not on file   Stress: Not on file   Social Connections: Socially Integrated (2/23/2025)    [facility]    Social Connections     Do you often feel lonely or isolated from those around you?: 0   Interpersonal Safety: Low Risk  (5/19/2025)    Interpersonal Safety     Do you feel physically and emotionally safe where you currently live?: Yes     Within the past 12 months, have you been hit, slapped, kicked or otherwise physically hurt by someone?: No     Within the past 12 months, have you been humiliated or emotionally abused in other ways by your partner or ex-partner?: No   Housing Stability: Low Risk  (3/4/2025)    Housing Stability     Do you have housing? : Yes     Are you worried about losing your housing?: No       Family History  Family History   Problem Relation Age of Onset    Thrombosis Paternal Grandmother     Thrombosis Paternal Grandfather     Diabetes Other     Anesthesia Reaction No family hx of     Bleeding Disorder No family hx of        Review of Systems  The complete review of systems is negative other than noted in the HPI or here.   Anesthesia Evaluation   Pt has had prior anesthetic. Type: General and MAC.    No history of anesthetic complications       ROS/MED HX  ENT/Pulmonary: Comment: Currently smoking  3-5 cigs daily  Smoking marijuana daily    (+)                tobacco use, Current use,   patient smoked within 24  hours,                    Neurologic: Comment: ADHD    (+)      migraines,                       (-) no seizures   Cardiovascular:     (+)  - -   -  - -                                 Previous cardiac testing   Echo: Date: Results:    Stress Test:  Date: Results:    ECG Reviewed:  Date: 1/9/2024 Results:  ST  Cath:  Date: Results:   (-) taking anticoagulants/antiplatelets, LUNA and arrhythmias   METS/Exercise Tolerance: >4 METS Comment: 2 dogs and 2 kids. Walks dogs. Denies exertional symptoms    Hematologic:  - neg hematologic  ROS  (-) history of blood transfusion   Musculoskeletal:  - neg musculoskeletal ROS     GI/Hepatic: Comment: Alcohol induced pancreatitis  Pseudocyst  Pseudoaneurysm status post coiling embolization on 1/19/2024    No recent abd pain, N/V    (+) GERD, Asymptomatic on medication,                  Renal/Genitourinary: Comment: Renal cyst      Endo:  - neg endo ROS  (-) Type II DM   Psychiatric/Substance Use: Comment: Alcohol in remission    (+) psychiatric history depression alcohol abuse      Infectious Disease:  - neg infectious disease ROS     Malignancy:  - neg malignancy ROS     Other:  - neg other ROS          Virtual visit -  No vitals were obtained    Physical Exam  Constitutional: Awake, alert, cooperative, no apparent distress, and appears stated age.  HENT: Normocephalic  Respiratory: non labored breathing   Neurologic: Awake, alert, oriented to name, place and time.   Neuropsychiatric: Calm, cooperative. Normal affect.      Prior Labs/Diagnostic Studies   All labs and imaging pertinent to the visit personally reviewed     EKG/ stress test - if available please see in ROS above   No results found.       No data to display                  The patient's records and results pertinent to the visit personally reviewed by this provider.     Outside records reviewed from: Care Everywhere      Assessment    Rani JESSICA Lisa is a 29 year old female seen as a PAC referral for risk  "assessment and optimization for anesthesia.    Plan/Recommendations  Pt will be optimized for the proposed procedure.  See below for details on the assessment, risk, and preoperative recommendations    NEUROLOGY  - No history of TIA, CVA or seizure  -h/o migraines  -Post Op delirium risk factors:  No risk identified    ENT  - No current airway concerns.  Will need to be reassessed day of surgery.  Mallampati: Unable to assess  TM: Unable to assess    CARDIAC  - No history of CAD, Hypertension, and Afib  -denies cardiac symptoms   - METS (Metabolic Equivalents)  Patient performs 4 or more METS exercise without symptoms             Total Score: 0      RCRI-Very low risk: Class 1 0.4% complication rate             Total Score: 0        PULMONARY  ZEUS Low Risk             Total Score: 0      - Denies asthma or inhaler use  - Tobacco History    History   Smoking Status    Every Day    Types: Cigarettes   Smokeless Tobacco    Never   -denies smoking cessation counseling     GI  - GERD  Controlled on medications: Proton Pump Inhibitor  -pancreatitis, pancreatic duct stones. Serial ERCPs. Above procedure planned   PONV Medium Risk  Total Score: 2           1 AN PONV: Pt is Female    1 AN PONV: Intended Post Op Opioids        /RENAL  - Baseline Creatinine WNL    ENDOCRINE    - BMI: Estimated body mass index is 23.74 kg/m  as calculated from the following:    Height as of this encounter: 1.6 m (5' 3\").    Weight as of this encounter: 60.8 kg (134 lb).  Healthy Weight (BMI 18.5-24.9)  - No history of Diabetes Mellitus    HEME  VTE Low Risk 0.26%             Total Score: 0      - No history of abnormal bleeding or antiplatelet use.    MSK  Patient is NOT Frail             Total Score: 0      -PM&R referral not indicated     Different anesthesia methods/types have been discussed with the patient, but they are aware that the final plan will be decided by the assigned anesthesia provider on the date of service.    The patient is " optimized for their procedure. AVS with information on surgery time/arrival time, meds and NPO status given by nursing staff. No further diagnostic testing indicated.    Please refer to the physical examination documented by the anesthesiologist in the anesthesia record on the day of surgery.    Video-Visit Details    Type of service:  Video Visit    Provider received verbal consent for a Video Visit from the patient? Yes     Originating Location (pt. Location): Home    Distant Location (provider location):  Off-site  Mode of Communication:  Video Conference via [employer]    23 minutes were spent on the date of the encounter performing chart review, history and exam, documentation and/or discussion with other providers about the issues documented above.    Snow Montanez PA-C  Preoperative Assessment Center  [facility]  Clinic and Surgery Center  Phone: [phone]  Fax: [phone]

## 2025-06-11 NOTE — PATIENT INSTRUCTIONS
Preparing for Your Surgery      Name:  Rani Lisa   MRN:  [id number]   :  1995   Today's Date:  2025     The [place] [employer] [address] Construction Project                                Timeline 2025 -2025    This project will affect travel to the [facility] and [facility], as well as the [facility] and [facility].      Please check the [facility] [address] project website for the most up to date information and give yourself additional time to reach your destination.        Arriving for surgery:  Surgery date:  2025  Arrival time:  9:15 am    Please come to:         [facility]    [address] [place]  [address]     The [facility] Patient/Visitor Ramp is at [address]. Patients and visitors who self-park will receive the reduced hospital parking rate. If the Patient /Visitor Ramp is full, please follow the signs to the [employer] car park located at the [facility] entrance.      [employer] parking is available (24 hours/ 7 days a week)      Discounted parking pass options are available for patients and visitors. They can be purchased at the [employer] desk at the [facility] entrance.     -    Stop at the security desk and they will direct surgery patients to the Surgery Check in and Family Lounge. [phone]        - If you need directions, a wheelchair or an escort please stop at the Information/security desk in the lobby.     What can I eat or drink?  -  You may eat and drink normally up to 8 hours prior to arrival time. (Until Midnight)  -  You may have clear liquids until 2 hours prior to arrival time. (Until 7:15 am)    Examples of clear liquids:  Water  Clear broth  Juices (apple, white grape, white cranberry  and cider) without pulp  Noncarbonated, powder based beverages  (lemonade and Kurt-Aid)  Sodas  (Saige, 7-Up, ginger ale and seltzer)  Coffee or tea (without milk or cream)  Gatorade    -  No Alcohol or cannabis products for at least 24 hours before surgery.     Which medicines can I take?    Hold Aspirin for 7 days before surgery.   Hold Multivitamins for 7 days before surgery.  Hold Supplements for 7 days before surgery.  Hold Ibuprofen (Advil, Motrin) for 1 day(s) before surgery--unless otherwise directed by surgeon.  Hold Naproxen (Aleve) for 4 days before surgery.    -  DO NOT take these medications the day of surgery:  Vyvanase       -  PLEASE TAKE these medications the day of surgery:  Omeprazole   Ondansetron if needed       How do I prepare myself?  - Please take 2 showers (one the night prior to surgery and one the morning of surgery) using Scrubcare or Hibiclens soap.    Use this soap only from the neck to your toes. Avoid genital area      Leave the soap on your skin for one minute--then rinse thoroughly.      You may use your own shampoo and conditioner. No other hair products.   - Please remove all jewelry and body piercings.  - No lotions, deodorants or fragrance.  - No makeup or fingernail polish.   - Bring your ID and insurance card.    -For patients being admitted to the [facility]  Family members are to take the patient belongings with them and place them in the lockers provided in the Family Lounge.  Please limit the items you bring to 1 bag as the lockers are small.      -If you use a CPAP machine, please bring the CPAP machine, tubing, and mask to hospital.    -If you have a Deep Brain Stimulator, Spinal Cord Stimulator, or any Neuro Stimulator device---you must bring the remote control to the hospital.      ALL PATIENTS GOING HOME THE SAME DAY OF SURGERY ARE REQUIRED TO HAVE A RESPONSIBLE ADULT TO DRIVE AND BE IN ATTENDANCE WITH THEM FOR 24 HOURS FOLLOWING SURGERY.    Covid testing policy as of 12/06/2022  Your surgeon will notify and schedule you for a COVID test if one is  needed before surgery--please direct any questions or COVID symptoms to your surgeon      Questions or Concerns:    - For any questions regarding the day of surgery or your hospital stay, please contact the Pre Admission Nursing Office at [phone].       - If you have health changes between today and your surgery, please call your surgeon.       - For questions after surgery, please call your surgeons office.           Current Visitor Guidelines    2 adult visitors for adult patients in the pre op area    If additional visitors come (beyond a patient care attendant or a group home caregiver), the additional visitors will be asked to wait in the main lobby of the hospital    Visiting hours: 8 a.m. to 8:30 p.m.    Patients confirmed or suspected to have symptoms of COVID 19 or flu:     No visitors allowed for adult patients.   Children (under age 18) can have 1 named visitor.     People who are sick or showing symptoms of COVID 19 or flu:    Are not allowed to visit patients--we can only make exceptions in special situations.       Please follow these guidelines for your visit:          Please maintain social distance          Masking is optional--however at times you may be asked to wear a mask for the safety of yourself and others     Clean your hands with alcohol hand . Do this when you arrive at and leave the building and patient room,    And again after you touch your mask or anything in the room.     Go directly to and from the room you are visiting.     Stay in the patient s room during your visit. Limit going to other places in the hospital as much as possible     Leave bags and jackets at home or in the car.     For everyone s health, please don t come and go during your visit. That includes for smoking   during your visit.

## 2025-06-11 NOTE — PROGRESS NOTES
Rayshawn is a 29 year old who is being evaluated via a billable video visit.      How would you like to obtain your AVS? Alvaro          Subjective   Rayshawn is a 29 year old, presenting for the following health issues:  Pre-Op Exam            JODIE Walton LPN

## 2025-06-23 ENCOUNTER — ANESTHESIA (OUTPATIENT)
Dept: SURGERY | Facility: CLINIC | Age: 30
End: 2025-06-23
Payer: COMMERCIAL

## 2025-06-23 ENCOUNTER — HOSPITAL ENCOUNTER (OUTPATIENT)
Facility: CLINIC | Age: 30
Discharge: HOME | End: 2025-06-23
Attending: INTERNAL MEDICINE | Admitting: INTERNAL MEDICINE
Payer: COMMERCIAL

## 2025-06-23 ENCOUNTER — APPOINTMENT (OUTPATIENT)
Dept: GENERAL RADIOLOGY | Facility: CLINIC | Age: 30
End: 2025-06-23
Attending: INTERNAL MEDICINE
Payer: COMMERCIAL

## 2025-06-23 VITALS
TEMPERATURE: 97.7 F | SYSTOLIC BLOOD PRESSURE: 115 MMHG | HEIGHT: 63 IN | OXYGEN SATURATION: 100 % | BODY MASS INDEX: 23.44 KG/M2 | WEIGHT: 132.28 LBS | DIASTOLIC BLOOD PRESSURE: 85 MMHG | HEART RATE: 72 BPM | RESPIRATION RATE: 14 BRPM

## 2025-06-23 LAB
ALBUMIN SERPL BCG-MCNC: 4.5 G/DL (ref 3.5–5.2)
ALP SERPL-CCNC: 90 U/L (ref 40–150)
ALT SERPL W P-5'-P-CCNC: 18 U/L (ref 0–50)
ANION GAP SERPL CALCULATED.3IONS-SCNC: 11 MMOL/L (ref 7–15)
AST SERPL W P-5'-P-CCNC: 27 U/L (ref 0–45)
BILIRUB SERPL-MCNC: 0.2 MG/DL
BUN SERPL-MCNC: 13.2 MG/DL (ref 6–20)
CALCIUM SERPL-MCNC: 9 MG/DL (ref 8.8–10.4)
CHLORIDE SERPL-SCNC: 107 MMOL/L (ref 98–107)
CREAT SERPL-MCNC: 0.79 MG/DL (ref 0.51–0.95)
EGFRCR SERPLBLD CKD-EPI 2021: >90 ML/MIN/1.73M2
ERCP: NORMAL
ERYTHROCYTE [DISTWIDTH] IN BLOOD BY AUTOMATED COUNT: 13.9 % (ref 10–15)
GLUCOSE SERPL-MCNC: 113 MG/DL (ref 70–99)
HCO3 SERPL-SCNC: 22 MMOL/L (ref 22–29)
HCT VFR BLD AUTO: 38 % (ref 35–47)
HGB BLD-MCNC: 12.3 G/DL (ref 11.7–15.7)
INR PPP: 0.93 (ref 0.85–1.15)
LIPASE SERPL-CCNC: 22 U/L (ref 13–60)
MCH RBC QN AUTO: 27.6 PG (ref 26.5–33)
MCHC RBC AUTO-ENTMCNC: 32.4 G/DL (ref 31.5–36.5)
MCV RBC AUTO: 85 FL (ref 78–100)
PLATELET # BLD AUTO: 170 10E3/UL (ref 150–450)
POTASSIUM SERPL-SCNC: 4.1 MMOL/L (ref 3.4–5.3)
PROT SERPL-MCNC: 7.2 G/DL (ref 6.4–8.3)
PROTHROMBIN TIME: 12.8 SECONDS (ref 11.8–14.8)
RBC # BLD AUTO: 4.46 10E6/UL (ref 3.8–5.2)
SODIUM SERPL-SCNC: 140 MMOL/L (ref 135–145)
WBC # BLD AUTO: 7.9 10E3/UL (ref 4–11)

## 2025-06-23 PROCEDURE — 272N000001 HC OR GENERAL SUPPLY STERILE: Performed by: INTERNAL MEDICINE

## 2025-06-23 PROCEDURE — 255N000002 HC RX 255 OP 636: Performed by: INTERNAL MEDICINE

## 2025-06-23 PROCEDURE — 250N000009 HC RX 250: Performed by: NURSE ANESTHETIST, CERTIFIED REGISTERED

## 2025-06-23 PROCEDURE — 250N000009 HC RX 250: Performed by: INTERNAL MEDICINE

## 2025-06-23 PROCEDURE — C1726 CATH, BAL DIL, NON-VASCULAR: HCPCS | Performed by: INTERNAL MEDICINE

## 2025-06-23 PROCEDURE — 999N000181 XR SURGERY CARM FLUORO GREATER THAN 5 MIN W STILLS

## 2025-06-23 PROCEDURE — 710N000012 HC RECOVERY PHASE 2, PER MINUTE: Performed by: INTERNAL MEDICINE

## 2025-06-23 PROCEDURE — 83690 ASSAY OF LIPASE: CPT | Performed by: INTERNAL MEDICINE

## 2025-06-23 PROCEDURE — 360N000084 HC SURGERY LEVEL 4 W/ FLUORO, PER MIN: Performed by: INTERNAL MEDICINE

## 2025-06-23 PROCEDURE — 85014 HEMATOCRIT: CPT | Performed by: INTERNAL MEDICINE

## 2025-06-23 PROCEDURE — 250N000025 HC SEVOFLURANE, PER MIN: Performed by: INTERNAL MEDICINE

## 2025-06-23 PROCEDURE — 36415 COLL VENOUS BLD VENIPUNCTURE: CPT | Performed by: INTERNAL MEDICINE

## 2025-06-23 PROCEDURE — 82947 ASSAY GLUCOSE BLOOD QUANT: CPT | Performed by: INTERNAL MEDICINE

## 2025-06-23 PROCEDURE — 258N000003 HC RX IP 258 OP 636: Performed by: NURSE ANESTHETIST, CERTIFIED REGISTERED

## 2025-06-23 PROCEDURE — 370N000017 HC ANESTHESIA TECHNICAL FEE, PER MIN: Performed by: INTERNAL MEDICINE

## 2025-06-23 PROCEDURE — 250N000011 HC RX IP 250 OP 636: Performed by: NURSE ANESTHETIST, CERTIFIED REGISTERED

## 2025-06-23 PROCEDURE — 999N000141 HC STATISTIC PRE-PROCEDURE NURSING ASSESSMENT: Performed by: INTERNAL MEDICINE

## 2025-06-23 PROCEDURE — 710N000010 HC RECOVERY PHASE 1, LEVEL 2, PER MIN: Performed by: INTERNAL MEDICINE

## 2025-06-23 PROCEDURE — 85610 PROTHROMBIN TIME: CPT | Performed by: INTERNAL MEDICINE

## 2025-06-23 PROCEDURE — C1877 STENT, NON-COAT/COV W/O DEL: HCPCS | Performed by: INTERNAL MEDICINE

## 2025-06-23 PROCEDURE — C1769 GUIDE WIRE: HCPCS | Performed by: INTERNAL MEDICINE

## 2025-06-23 DEVICE — A STERILE NON-BIOABSORBABLE TUBULAR DEVICE INTENDED TO BE IMPLANTED IN AN OBSTRUCTED PANCREATIC DUCT (E.G., DUE TO STRICTURE OR MALIGNANCY) TO MAINTAIN LUMINAL PATENCY; IT MAY ALSO BE INTENDED TO TREAT A WIDE VARIETY OF CONDITIONS IN PANCREATIC ENDOSCOPY (E.G., DRAIN PSEUDOCYST, TREAT FISTULA OR DUCT DISRUPTION). IT IS MADE ENTIRELY OF A SYNTHETIC POLYMER AND HAS A CONTINUOUS TUBE DESIGN WITH OR WITHOUT RETENTION FLANGES. THIS IS A SINGLE-USE DEVICE.
Type: IMPLANTABLE DEVICE | Site: PANCREATIC DUCT | Status: FUNCTIONAL
Brand: FREEMAN PANCREATIC FLEXI-STENT

## 2025-06-23 RX ORDER — DEXAMETHASONE SODIUM PHOSPHATE 4 MG/ML
INJECTION, SOLUTION INTRA-ARTICULAR; INTRALESIONAL; INTRAMUSCULAR; INTRAVENOUS; SOFT TISSUE PRN
Status: DISCONTINUED | OUTPATIENT
Start: 2025-06-23 | End: 2025-06-23

## 2025-06-23 RX ORDER — ACETAMINOPHEN 325 MG/1
975 TABLET ORAL ONCE
Status: DISCONTINUED | OUTPATIENT
Start: 2025-06-23 | End: 2025-06-27 | Stop reason: HOSPADM

## 2025-06-23 RX ORDER — CIPROFLOXACIN 2 MG/ML
INJECTION, SOLUTION INTRAVENOUS PRN
Status: DISCONTINUED | OUTPATIENT
Start: 2025-06-23 | End: 2025-06-23

## 2025-06-23 RX ORDER — SODIUM CHLORIDE, SODIUM LACTATE, POTASSIUM CHLORIDE, CALCIUM CHLORIDE 600; 310; 30; 20 MG/100ML; MG/100ML; MG/100ML; MG/100ML
INJECTION, SOLUTION INTRAVENOUS CONTINUOUS
Status: DISCONTINUED | OUTPATIENT
Start: 2025-06-23 | End: 2025-06-23 | Stop reason: HOSPADM

## 2025-06-23 RX ORDER — DEXAMETHASONE SODIUM PHOSPHATE 4 MG/ML
4 INJECTION, SOLUTION INTRA-ARTICULAR; INTRALESIONAL; INTRAMUSCULAR; INTRAVENOUS; SOFT TISSUE
Status: DISCONTINUED | OUTPATIENT
Start: 2025-06-23 | End: 2025-06-23 | Stop reason: HOSPADM

## 2025-06-23 RX ORDER — ONDANSETRON 4 MG/1
4 TABLET, ORALLY DISINTEGRATING ORAL EVERY 30 MIN PRN
Status: DISCONTINUED | OUTPATIENT
Start: 2025-06-23 | End: 2025-06-27 | Stop reason: HOSPADM

## 2025-06-23 RX ORDER — ONDANSETRON 2 MG/ML
INJECTION INTRAMUSCULAR; INTRAVENOUS PRN
Status: DISCONTINUED | OUTPATIENT
Start: 2025-06-23 | End: 2025-06-23

## 2025-06-23 RX ORDER — FENTANYL CITRATE 50 UG/ML
INJECTION, SOLUTION INTRAMUSCULAR; INTRAVENOUS PRN
Status: DISCONTINUED | OUTPATIENT
Start: 2025-06-23 | End: 2025-06-23

## 2025-06-23 RX ORDER — LIDOCAINE 40 MG/G
CREAM TOPICAL
Status: DISCONTINUED | OUTPATIENT
Start: 2025-06-23 | End: 2025-06-23 | Stop reason: HOSPADM

## 2025-06-23 RX ORDER — IOPAMIDOL 510 MG/ML
INJECTION, SOLUTION INTRAVASCULAR PRN
Status: DISCONTINUED | OUTPATIENT
Start: 2025-06-23 | End: 2025-06-23 | Stop reason: HOSPADM

## 2025-06-23 RX ORDER — ONDANSETRON 2 MG/ML
4 INJECTION INTRAMUSCULAR; INTRAVENOUS EVERY 30 MIN PRN
Status: DISCONTINUED | OUTPATIENT
Start: 2025-06-23 | End: 2025-06-27 | Stop reason: HOSPADM

## 2025-06-23 RX ORDER — ONDANSETRON 4 MG/1
4 TABLET, ORALLY DISINTEGRATING ORAL EVERY 30 MIN PRN
Status: DISCONTINUED | OUTPATIENT
Start: 2025-06-23 | End: 2025-06-23 | Stop reason: HOSPADM

## 2025-06-23 RX ORDER — NALOXONE HYDROCHLORIDE 0.4 MG/ML
0.1 INJECTION, SOLUTION INTRAMUSCULAR; INTRAVENOUS; SUBCUTANEOUS
Status: DISCONTINUED | OUTPATIENT
Start: 2025-06-23 | End: 2025-06-23 | Stop reason: HOSPADM

## 2025-06-23 RX ORDER — FENTANYL CITRATE 50 UG/ML
25 INJECTION, SOLUTION INTRAMUSCULAR; INTRAVENOUS EVERY 5 MIN PRN
Status: DISCONTINUED | OUTPATIENT
Start: 2025-06-23 | End: 2025-06-23 | Stop reason: HOSPADM

## 2025-06-23 RX ORDER — HYDRALAZINE HYDROCHLORIDE 20 MG/ML
2.5-5 INJECTION INTRAMUSCULAR; INTRAVENOUS EVERY 10 MIN PRN
Status: DISCONTINUED | OUTPATIENT
Start: 2025-06-23 | End: 2025-06-23 | Stop reason: HOSPADM

## 2025-06-23 RX ORDER — NALOXONE HYDROCHLORIDE 0.4 MG/ML
0.1 INJECTION, SOLUTION INTRAMUSCULAR; INTRAVENOUS; SUBCUTANEOUS
Status: DISCONTINUED | OUTPATIENT
Start: 2025-06-23 | End: 2025-06-27 | Stop reason: HOSPADM

## 2025-06-23 RX ORDER — PROPOFOL 10 MG/ML
INJECTION, EMULSION INTRAVENOUS PRN
Status: DISCONTINUED | OUTPATIENT
Start: 2025-06-23 | End: 2025-06-23

## 2025-06-23 RX ORDER — INDOMETHACIN 50 MG/1
SUPPOSITORY RECTAL PRN
Status: DISCONTINUED | OUTPATIENT
Start: 2025-06-23 | End: 2025-06-23 | Stop reason: HOSPADM

## 2025-06-23 RX ORDER — ONDANSETRON 2 MG/ML
4 INJECTION INTRAMUSCULAR; INTRAVENOUS EVERY 30 MIN PRN
Status: DISCONTINUED | OUTPATIENT
Start: 2025-06-23 | End: 2025-06-23 | Stop reason: HOSPADM

## 2025-06-23 RX ORDER — HYDROMORPHONE HCL IN WATER/PF 6 MG/30 ML
0.4 PATIENT CONTROLLED ANALGESIA SYRINGE INTRAVENOUS EVERY 5 MIN PRN
Status: DISCONTINUED | OUTPATIENT
Start: 2025-06-23 | End: 2025-06-23 | Stop reason: HOSPADM

## 2025-06-23 RX ORDER — OXYCODONE HYDROCHLORIDE 10 MG/1
10 TABLET ORAL
Status: DISCONTINUED | OUTPATIENT
Start: 2025-06-23 | End: 2025-06-27 | Stop reason: HOSPADM

## 2025-06-23 RX ORDER — OXYCODONE HYDROCHLORIDE 5 MG/1
5 TABLET ORAL
Status: DISCONTINUED | OUTPATIENT
Start: 2025-06-23 | End: 2025-06-27 | Stop reason: HOSPADM

## 2025-06-23 RX ORDER — DEXAMETHASONE SODIUM PHOSPHATE 4 MG/ML
4 INJECTION, SOLUTION INTRA-ARTICULAR; INTRALESIONAL; INTRAMUSCULAR; INTRAVENOUS; SOFT TISSUE
Status: DISCONTINUED | OUTPATIENT
Start: 2025-06-23 | End: 2025-06-27 | Stop reason: HOSPADM

## 2025-06-23 RX ORDER — HYDROMORPHONE HCL IN WATER/PF 6 MG/30 ML
0.2 PATIENT CONTROLLED ANALGESIA SYRINGE INTRAVENOUS EVERY 5 MIN PRN
Status: DISCONTINUED | OUTPATIENT
Start: 2025-06-23 | End: 2025-06-23 | Stop reason: HOSPADM

## 2025-06-23 RX ORDER — LIDOCAINE HYDROCHLORIDE 20 MG/ML
INJECTION, SOLUTION INFILTRATION; PERINEURAL PRN
Status: DISCONTINUED | OUTPATIENT
Start: 2025-06-23 | End: 2025-06-23

## 2025-06-23 RX ORDER — SODIUM CHLORIDE, SODIUM LACTATE, POTASSIUM CHLORIDE, CALCIUM CHLORIDE 600; 310; 30; 20 MG/100ML; MG/100ML; MG/100ML; MG/100ML
INJECTION, SOLUTION INTRAVENOUS CONTINUOUS PRN
Status: DISCONTINUED | OUTPATIENT
Start: 2025-06-23 | End: 2025-06-23

## 2025-06-23 RX ORDER — FENTANYL CITRATE 50 UG/ML
50 INJECTION, SOLUTION INTRAMUSCULAR; INTRAVENOUS EVERY 5 MIN PRN
Status: DISCONTINUED | OUTPATIENT
Start: 2025-06-23 | End: 2025-06-23 | Stop reason: HOSPADM

## 2025-06-23 RX ORDER — LABETALOL HYDROCHLORIDE 5 MG/ML
10 INJECTION, SOLUTION INTRAVENOUS
Status: DISCONTINUED | OUTPATIENT
Start: 2025-06-23 | End: 2025-06-23 | Stop reason: HOSPADM

## 2025-06-23 RX ADMIN — ONDANSETRON 4 MG: 2 INJECTION INTRAMUSCULAR; INTRAVENOUS at 11:45

## 2025-06-23 RX ADMIN — LIDOCAINE HYDROCHLORIDE 100 MG: 20 INJECTION, SOLUTION INFILTRATION; PERINEURAL at 11:00

## 2025-06-23 RX ADMIN — DEXAMETHASONE SODIUM PHOSPHATE 4 MG: 4 INJECTION, SOLUTION INTRAMUSCULAR; INTRAVENOUS at 11:05

## 2025-06-23 RX ADMIN — MIDAZOLAM 2 MG: 1 INJECTION INTRAMUSCULAR; INTRAVENOUS at 10:43

## 2025-06-23 RX ADMIN — Medication 100 MG: at 11:46

## 2025-06-23 RX ADMIN — CIPROFLOXACIN 400 MG: 400 INJECTION, SOLUTION INTRAVENOUS at 10:52

## 2025-06-23 RX ADMIN — FENTANYL CITRATE 100 MCG: 50 INJECTION INTRAMUSCULAR; INTRAVENOUS at 11:00

## 2025-06-23 RX ADMIN — SODIUM CHLORIDE, SODIUM LACTATE, POTASSIUM CHLORIDE, AND CALCIUM CHLORIDE: .6; .31; .03; .02 INJECTION, SOLUTION INTRAVENOUS at 10:48

## 2025-06-23 RX ADMIN — PROPOFOL 30 MG: 10 INJECTION, EMULSION INTRAVENOUS at 11:34

## 2025-06-23 RX ADMIN — PHENYLEPHRINE HYDROCHLORIDE 100 MCG: 10 INJECTION INTRAVENOUS at 11:12

## 2025-06-23 RX ADMIN — Medication 50 MG: at 11:03

## 2025-06-23 RX ADMIN — PROPOFOL 110 MG: 10 INJECTION, EMULSION INTRAVENOUS at 11:01

## 2025-06-23 ASSESSMENT — ACTIVITIES OF DAILY LIVING (ADL)
ADLS_ACUITY_SCORE: 51
ADLS_ACUITY_SCORE: 50
ADLS_ACUITY_SCORE: 51
ADLS_ACUITY_SCORE: 50
ADLS_ACUITY_SCORE: 51

## 2025-06-23 NOTE — ANESTHESIA PROCEDURE NOTES
Airway       Patient location during procedure: OR       Procedure Start/Stop Times: 6/23/2025 11:05 AM  Staff -        Anesthesiologist:  Nikolas Cavazos MD       CRNA: Ernesto Easley APRN CRNA       Performed By: CRNA  Consent for Airway        Urgency: elective  Indications and Patient Condition       Indications for airway management: romy-procedural       Induction type:inhalational       Mask difficulty assessment: 1 - vent by mask    Final Airway Details       Final airway type: endotracheal airway       Successful airway: ETT - single  Endotracheal Airway Details        ETT size (mm): 7.0       Cuffed: yes       Successful intubation technique: direct laryngoscopy       DL Blade Type: MAC 3       Grade View of Cords: 1       Adjucts: stylet       Position: Right       Measured from: lips       Secured at (cm): 21       Bite Block used: Endo bite block padded with gauze.    Post intubation assessment        Placement verified by: capnometry, equal breath sounds and chest rise        Number of attempts at approach: 1       Number of other approaches attempted: 0       Secured with: tape       Ease of procedure: easy       Dentition: Intact and Unchanged    Medication(s) Administered   Medication Administration Time: 6/23/2025 11:05 AM

## 2025-06-23 NOTE — OR NURSING
Dr. Denny paged as pt needs an op-note, discharge order, and to be seen by GI team prior to discharge.

## 2025-06-23 NOTE — ANESTHESIA CARE TRANSFER NOTE
Patient: Rani Lisa    Procedure: Procedure(s):  Endoscopic Retrograde Cholangiopancreatography (ERCP) with pancreatic stone removal and stent exchange and spyglass       Diagnosis: Pancreatic duct stricture [K86.89]  Pancreatic duct stones [K86.89]  Diagnosis Additional Information: No value filed.    Anesthesia Type:   General     Note:    Oropharynx: oropharynx clear of all foreign objects  Level of Consciousness: awake  Oxygen Supplementation: face mask  Level of Supplemental Oxygen (L/min / FiO2): 8  Independent Airway: airway patency satisfactory and stable  Dentition: dentition unchanged  Vital Signs Stable: post-procedure vital signs reviewed and stable  Report to RN Given: handoff report given  Patient transferred to: PACU  Comments: Pt to PACU with monitors/O2, airway/IV patent, reports pain<3/10, resting comfortably.    Handoff Report: Identifed the Patient, Identified the Reponsible Provider, Reviewed the pertinent medical history, Discussed the surgical course, Reviewed Intra-OP anesthesia mangement and issues during anesthesia, Set expectations for post-procedure period and Allowed opportunity for questions and acknowledgement of understanding      Vitals:  Vitals Value Taken Time   /78 06/23/25 12:01   Temp 36.4    Pulse 71 06/23/25 12:03   Resp 16 06/23/25 12:03   SpO2 100 % 06/23/25 12:03   Vitals shown include unfiled device data.    Electronically Signed By: SIMON Beal CRNA  June 23, 2025  12:04 PM

## 2025-06-23 NOTE — ANESTHESIA POSTPROCEDURE EVALUATION
Patient: Rani Lisa    Procedure: Procedure(s):  Endoscopic Retrograde Cholangiopancreatography (ERCP) with pancreatic stone removal and stent exchange and spyglass       Anesthesia Type:  General    Note:  Disposition: Outpatient   Postop Pain Control: Uneventful            Sign Out: Well controlled pain   PONV: No   Neuro/Psych: Uneventful            Sign Out: Acceptable/Baseline neuro status   Airway/Respiratory: Uneventful            Sign Out: Acceptable/Baseline resp. status   CV/Hemodynamics: Uneventful            Sign Out: Acceptable CV status; No obvious hypovolemia; No obvious fluid overload   Other NRE: NONE   DID A NON-ROUTINE EVENT OCCUR? No           Last vitals:  Vitals Value Taken Time   /91 06/23/25 12:45   Temp     Pulse 63 06/23/25 12:51   Resp 12 06/23/25 12:51   SpO2 100 % 06/23/25 12:51   Vitals shown include unfiled device data.    Electronically Signed By: Porfirio Yuan MD  June 23, 2025  12:51 PM

## 2025-06-23 NOTE — ANESTHESIA PREPROCEDURE EVALUATION
Anesthesia Pre-Procedure Evaluation    Patient: Rani Lisa   MRN: [id number] : 1995          Procedure : Procedure(s):  Endoscopic Retrograde Cholangiopancreatography (ERCP) with spyglass         Past Medical History:   Diagnosis Date    Abdominal fluid collection     ADHD (attention deficit hyperactivity disorder)     Alcohol dependence (H)     Alcoholic pancreatitis     Depression     Hyperglycemia     Hypomagnesemia     Hyponatremia     Leukocytosis     Migraine     Nexplanon insertion     Pancreatic duct stones     Pancreatic duct stricture       Past Surgical History:   Procedure Laterality Date    COLPOSCOPY      ENDOSCOPIC RETROGRADE CHOLANGIOPANCREATOGRAM N/A 2/3/2025    Procedure: ENDOSCOPIC RETROGRADE CHOLANGIOPANCREATOGRAPHY WITH BILIARY SPHINCTEROTOMY AND STENT PLACEMENT, PANCREATIC DUCT SPHINCTEROTOMY, STONE REMOVAL, STENT PLACEMENT;  Surgeon: Guru Elena Ortiz MD;  Location: [facility]    ENDOSCOPIC RETROGRADE CHOLANGIOPANCREATOGRAM N/A 3/3/2025    Procedure: ENDOSCOPIC RETROGRADE CHOLANGIOPANCREATOGRAPHY, WITH Pancreatic Stent Exchange, Biliary Stent Removal, and sludge removal.;  Surgeon: Guru Elena Ortiz MD;  Location: [facility]    ENDOSCOPIC RETROGRADE CHOLANGIOPANCREATOGRAPHY, EXCHANGE TUBE/STENT N/A 2025    Procedure: ENDOSCOPIC RETROGRADE CHOLANGIOPANCREATOGRAPHY WITH PANCREATIC DEBRIS AND STONE REMOVAL, AND PANCREATIC STENT EXCHANGE.;  Surgeon: Guru Elena Ortiz MD;  Location: [facility]    ESOPHAGOSCOPY, GASTROSCOPY, DUODENOSCOPY (EGD), COMBINED N/A 2/3/2025    Procedure: ESOPHAGOGASTRODUODENOSCOPY, WITH FINE NEEDLE ASPIRATION BIOPSY, WITH ENDOSCOPIC ULTRASOUND GUIDANCE;  Surgeon: Guru Elena Ortiz MD;  Location: [facility]    ESOPHAGOSCOPY, GASTROSCOPY, DUODENOSCOPY (EGD), COMBINED N/A 3/3/2025    Procedure: Endoscopic ultrasound upper gastrointestinal tract (GI) with fine needle aspiration and cystgastrostomy  stent placement.;  Surgeon: Guru Elena Ortiz MD;  Location: [facility]    ESOPHAGOSCOPY, GASTROSCOPY, DUODENOSCOPY (EGD), COMBINED N/A 3/12/2025    Procedure: ESOPHAGOGASTRODUODENOSCOPY WITH CYSTGASTROSTOMY STENT EXCHANGE AND PUS REMOVAL;  Surgeon: Guru Elena Ortiz MD;  Location: [facility]    IR VISCERAL ANGIOGRAM  01/19/2024    TONSILLECTOMY & ADENOIDECTOMY        Allergies   Allergen Reactions    Bactrim [Sulfamethoxazole-Trimethoprim] Rash      Social History     Tobacco Use    Smoking status: Every Day     Current packs/day: 0.50     Types: Cigarettes     Passive exposure: Current    Smokeless tobacco: Never    Tobacco comments:     3-5 cpd   Substance Use Topics    Alcohol use: Not Currently      Wt Readings from Last 1 Encounters:   06/11/25 60.8 kg (134 lb)        Anesthesia Evaluation   Pt has had prior anesthetic. Type: General.        ROS/MED HX  ENT/Pulmonary:       Neurologic:     (+)      migraines,                          Cardiovascular:       METS/Exercise Tolerance: 4 - Raking leaves, gardening    Hematologic:       Musculoskeletal:       GI/Hepatic: Comment: Alcoholic pancreatitis      Renal/Genitourinary:     (+) renal disease,             Endo:       Psychiatric/Substance Use:     (+) psychiatric history anxiety and depression   Recreational drug usage: Cannabis.    Infectious Disease:       Malignancy:       Other:              Physical Exam  Airway  Mallampati: II  TM distance: >3 FB  Neck ROM: full  Upper bite lip test: III  Mouth opening: >= 4 cm    Cardiovascular - normal exam   Dental   (+) Multiple visibly decayed, broken teeth        Pulmonary - normal examBreath sounds clear to auscultation        Neurological - normal exam  She appears awake, alert and oriented x3.    Other Findings       OUTSIDE LABS:  CBC:   Lab Results   Component Value Date    WBC 8.2 05/19/2025    WBC 9.0 03/04/2025    HGB 12.1 05/19/2025    HGB 10.0 (L) 03/04/2025    HCT 38.6  "05/19/2025    HCT 32.3 (L) 03/04/2025     05/19/2025     03/04/2025     BMP:   Lab Results   Component Value Date     05/19/2025     03/04/2025    POTASSIUM 4.3 05/19/2025    POTASSIUM 3.8 03/04/2025    CHLORIDE 104 05/19/2025    CHLORIDE 101 03/04/2025    CO2 24 05/19/2025    CO2 26 03/04/2025    BUN 9.9 05/19/2025    BUN 8.9 03/04/2025    CR 0.79 05/19/2025    CR 0.74 03/04/2025     (H) 05/19/2025     (H) 03/04/2025     COAGS:   Lab Results   Component Value Date    PTT 28 01/19/2024    INR 0.97 05/19/2025     POC: No results found for: \"BGM\", \"HCG\", \"HCGS\"  HEPATIC:   Lab Results   Component Value Date    ALBUMIN 4.3 05/19/2025    PROTTOTAL 7.2 05/19/2025    ALT 19 05/19/2025    AST 26 05/19/2025    ALKPHOS 94 05/19/2025    BILITOTAL 0.3 05/19/2025     OTHER:   Lab Results   Component Value Date    LACT 0.7 01/19/2024    DUSTIN 9.2 05/19/2025    PHOS 4.7 (H) 01/19/2024    MAG 1.9 01/24/2024    LIPASE 23 05/19/2025    AMYLASE 109 (H) 02/03/2025    TSH 17.50 (H) 01/22/2024    T4 1.19 01/22/2024       Anesthesia Plan    ASA Status:  3       Anesthesia Type: General.  Airway: oral.  Induction: intravenous.  Maintenance: Balanced.   Techniques and Equipment:     - Airway:  Planned airway equipment includes video laryngoscope.     - Monitoring Plan: standard ASA monitoring, train of four monitoring     Consents    Anesthesia Plan(s) and associated risks, benefits, and realistic alternatives discussed. Questions answered and patient/representative(s) expressed understanding.     - Discussed: CRNA     - Discussed with:  Patient        - Pt is DNR/DNI Status: no DNR     Blood Consent:      - Discussed with: patient.     - Consented: consented to blood products     Postoperative Care    Pain management: non-narcotic analgesics.     Comments:                   Nikolas Cavazos MD    I have reviewed the pertinent notes and labs in the chart from the past 30 days and (re)examined the " patient.  Any updates or changes from those notes are reflected in this note.    Clinically Significant Risk Factors Present on Admission                                 # Financial/Environmental Concerns:

## 2025-06-23 NOTE — DISCHARGE INSTRUCTIONS
Contacting your Doctor -   To contact a doctor, call Dr. Ortiz at the [facility]ology [facility] @ [phone] (call second) [phone] (call first)  or:  [phone] and ask for the resident on call for Gastroenterology (answered 24 hours a day)   Emergency Department:  [facility]: [phone] if you are in need of immediate or emergent help     After Anesthesia (Sleep Medicine)  What should I do after anesthesia?  You should rest and relax for the next 24 hours. Avoid risky or difficult (strenuous) activity. A responsible adult should stay with you overnight.  Don't drive or use any heavy equipment for 24 hours. Even if you feel normal, your reactions may be affected by the sleep medicine given to you.  Don't drink alcohol or make any important decisions for 24 hours.  Slowly get back to your regular diet, as you feel able.  How should I expect to feel?  It's normal to feel dizzy, light-headed, or faint for up to a full day after anesthesia or while taking pain medicine. If this happens:   Sit down for a few minutes before standing.  Have someone help you when you get up to walk or use the bathroom.  If you have nausea (feel sick to your stomach) or vomit (throw up):   Drink clear liquids (such as apple juice, ginger ale, broth, or 7UP) until you feel better.  If you feel sick to your stomach, or you keep vomiting for 24 hours, please call the doctor.  What else should I know?  You might have a dry mouth, sore throat, muscle aches, or trouble sleeping. These should go away after 24 hours.  Please contact your doctor if you have any other symptoms that concern you, such as fever, pain, bleeding, fluid drainage, swelling, or headache, or if it's been over 8 to 10 hours and you still aren't able to pee (urinate).  If you have a history of sleep apnea, it's very important to use your CPAP machine for the next 24 hours when you nap or sleep.

## 2025-06-24 ASSESSMENT — ACTIVITIES OF DAILY LIVING (ADL)
ADLS_ACUITY_SCORE: 51

## 2025-06-25 ASSESSMENT — ACTIVITIES OF DAILY LIVING (ADL)
ADLS_ACUITY_SCORE: 51

## 2025-06-26 ASSESSMENT — ACTIVITIES OF DAILY LIVING (ADL)
ADLS_ACUITY_SCORE: 51

## 2025-06-27 ASSESSMENT — ACTIVITIES OF DAILY LIVING (ADL)
ADLS_ACUITY_SCORE: 51

## 2025-07-02 ENCOUNTER — CARE COORDINATION (OUTPATIENT)
Dept: GASTROENTEROLOGY | Facility: CLINIC | Age: 30
End: 2025-07-02
Payer: COMMERCIAL

## 2025-07-02 DIAGNOSIS — Z46.89 ENCOUNTER FOR REMOVAL OF PANCREATIC STENT: Primary | ICD-10-CM

## 2025-07-02 NOTE — PROGRESS NOTES
Post ERCP on 6/23/25 with Dr. Ortiz.      Follow-up recommendations:        - Confirm spontaneous stent passage by performing a                          flat and upright abdominal x-ray in 4 weeks.                          - Will recommend Pancreas clinic follow up in 4 months     Orders placed:   -Abdomen xray (due 7/21/25); Added to Panc/bili stent log.  Provided patient with imaging scheduling number: [phone] via [facility]    -Message to clinic coordinators to assist with clinic arrangements.      Freda Davison RN Care Coordinator

## 2025-07-03 ENCOUNTER — TELEPHONE (OUTPATIENT)
Dept: GASTROENTEROLOGY | Facility: CLINIC | Age: 30
End: 2025-07-03
Payer: COMMERCIAL

## 2025-07-03 NOTE — TELEPHONE ENCOUNTER
Left Voicemail (1st Attempt) for the patient to call back and schedule the following:    Appointment type: return   Provider: Dr. Andino  Return date: next available   Specialty phone number: [phone]  Additional appointment(s) needed:   Additonal Notes:

## 2025-07-07 ENCOUNTER — OFFICE VISIT (OUTPATIENT)
Dept: FAMILY MEDICINE | Facility: CLINIC | Age: 30
End: 2025-07-07
Attending: STUDENT IN AN ORGANIZED HEALTH CARE EDUCATION/TRAINING PROGRAM
Payer: COMMERCIAL

## 2025-07-07 VITALS
BODY MASS INDEX: 23.39 KG/M2 | DIASTOLIC BLOOD PRESSURE: 72 MMHG | OXYGEN SATURATION: 99 % | RESPIRATION RATE: 14 BRPM | HEIGHT: 63 IN | SYSTOLIC BLOOD PRESSURE: 128 MMHG | HEART RATE: 116 BPM | TEMPERATURE: 99.2 F | WEIGHT: 132 LBS

## 2025-07-07 DIAGNOSIS — Z30.09 BIRTH CONTROL COUNSELING: ICD-10-CM

## 2025-07-07 DIAGNOSIS — F90.9 ATTENTION DEFICIT HYPERACTIVITY DISORDER (ADHD), UNSPECIFIED ADHD TYPE: Primary | ICD-10-CM

## 2025-07-07 PROCEDURE — 99213 OFFICE O/P EST LOW 20 MIN: CPT | Performed by: STUDENT IN AN ORGANIZED HEALTH CARE EDUCATION/TRAINING PROGRAM

## 2025-07-07 PROCEDURE — 3074F SYST BP LT 130 MM HG: CPT | Performed by: STUDENT IN AN ORGANIZED HEALTH CARE EDUCATION/TRAINING PROGRAM

## 2025-07-07 PROCEDURE — 3078F DIAST BP <80 MM HG: CPT | Performed by: STUDENT IN AN ORGANIZED HEALTH CARE EDUCATION/TRAINING PROGRAM

## 2025-07-07 PROCEDURE — 1126F AMNT PAIN NOTED NONE PRSNT: CPT | Performed by: STUDENT IN AN ORGANIZED HEALTH CARE EDUCATION/TRAINING PROGRAM

## 2025-07-07 RX ORDER — LISDEXAMFETAMINE DIMESYLATE 20 MG/1
20 CAPSULE ORAL 2 TIMES DAILY
Qty: 60 CAPSULE | Refills: 0 | Status: SHIPPED | OUTPATIENT
Start: 2025-09-05 | End: 2025-10-05

## 2025-07-07 RX ORDER — LISDEXAMFETAMINE DIMESYLATE 20 MG/1
20 CAPSULE ORAL 2 TIMES DAILY
Qty: 60 CAPSULE | Refills: 0 | Status: SHIPPED | OUTPATIENT
Start: 2025-08-06 | End: 2025-09-05

## 2025-07-07 RX ORDER — LISDEXAMFETAMINE DIMESYLATE 20 MG/1
20 CAPSULE ORAL 2 TIMES DAILY
Qty: 60 CAPSULE | Refills: 0 | Status: SHIPPED | OUTPATIENT
Start: 2025-07-07 | End: 2025-08-06

## 2025-07-07 ASSESSMENT — PAIN SCALES - GENERAL: PAINLEVEL_OUTOF10: NO PAIN (0)

## 2025-07-07 NOTE — PROGRESS NOTES
"  Assessment & Plan     Attention deficit hyperactivity disorder (ADHD), unspecified ADHD type  Patient is a very pleasant 29 year old who presents today for ADHD follow up. 20 mg qam is working well for her; felt like 30 mg in AM was too much. Is having only limited benefit from afternoon 10 mg dose. We will try to increase to 20 mg BID. If insurance doesn't cover/appeal denied, will return to 20-10 mg dosing.   - PRIMARY CARE FOLLOW-UP SCHEDULING  - lisdexamfetamine (VYVANSE) 20 MG capsule  Dispense: 60 capsule; Refill: 0  - lisdexamfetamine (VYVANSE) 20 MG capsule  Dispense: 60 capsule; Refill: 0  - lisdexamfetamine (VYVANSE) 20 MG capsule  Dispense: 60 capsule; Refill: 0    Birth control counseling  Nexplanon placed 3/2020. Overdue for removal. Will plan to have her return for removal/replacement. Offered OCP in the interim, she declines.           Subjective   Rayshawn is a 29 year old, presenting for the following health issues:  A.D.H.D        7/7/2025     1:20 PM   Additional Questions   Roomed by Mahogany HIGH   Accompanied by Self     History of Present Illness       Reason for visit:  Med check   She is taking medications regularly.      Medication Followup of Vyvanse  Taking Medication as prescribed: yes  Side Effects:  None  Medication Helping Symptoms:  yes    Still feels like it works, but feeling like a possible tolerance as well.   Around 2-3 pm gets very tired. Just want to take a nap. Then fatigued the rest of the day.   Noon for the 10 mg dose.     When on 30 in the morning, almost too much.       Birth control  Still has nexplanon in. Worked the best.    More acne as well.     Review of Systems  Constitutional, HEENT, cardiovascular, pulmonary, gi and gu systems are negative, except as otherwise noted.      Objective    /72 (BP Location: Right arm, Patient Position: Sitting, Cuff Size: Adult Regular)   Pulse 116   Temp 99.2  F (37.3  C) (Tympanic)   Resp 14   Ht 1.6 m (5' 3\")   Wt 59.9 kg " (132 lb)   LMP 06/15/2025 (Approximate)   SpO2 99%   BMI 23.38 kg/m    Body mass index is 23.38 kg/m .  Physical Exam  Constitutional:       Appearance: Normal appearance.   HENT:      Head: Normocephalic.   Eyes:      General: No scleral icterus.     Extraocular Movements: Extraocular movements intact.      Conjunctiva/sclera: Conjunctivae normal.   Cardiovascular:      Rate and Rhythm: Normal rate.   Pulmonary:      Effort: Pulmonary effort is normal.   Neurological:      General: No focal deficit present.      Mental Status: She is alert and oriented to person, place, and time.                Signed Electronically by: Dodie Lofton MD

## 2025-07-10 ENCOUNTER — OFFICE VISIT (OUTPATIENT)
Dept: FAMILY MEDICINE | Facility: CLINIC | Age: 30
End: 2025-07-10
Payer: COMMERCIAL

## 2025-07-10 VITALS
BODY MASS INDEX: 23.21 KG/M2 | OXYGEN SATURATION: 100 % | DIASTOLIC BLOOD PRESSURE: 70 MMHG | HEIGHT: 63 IN | HEART RATE: 94 BPM | RESPIRATION RATE: 14 BRPM | SYSTOLIC BLOOD PRESSURE: 110 MMHG | WEIGHT: 131 LBS

## 2025-07-10 DIAGNOSIS — Z30.46 ENCOUNTER FOR REMOVAL AND REINSERTION OF NEXPLANON: Primary | ICD-10-CM

## 2025-07-10 DIAGNOSIS — Z97.5 NEXPLANON IN PLACE: ICD-10-CM

## 2025-07-10 DIAGNOSIS — Z30.46 SURVEILLANCE OF PREVIOUSLY PRESCRIBED IMPLANTABLE SUBDERMAL CONTRACEPTIVE: ICD-10-CM

## 2025-07-10 LAB — HCG UR QL: NEGATIVE

## 2025-07-10 RX ORDER — LIDOCAINE HYDROCHLORIDE AND EPINEPHRINE 10; 10 MG/ML; UG/ML
5 INJECTION, SOLUTION INFILTRATION; PERINEURAL ONCE
Status: COMPLETED | OUTPATIENT
Start: 2025-07-10 | End: 2025-07-10

## 2025-07-10 RX ADMIN — LIDOCAINE HYDROCHLORIDE AND EPINEPHRINE 5 ML: 10; 10 INJECTION, SOLUTION INFILTRATION; PERINEURAL at 15:40

## 2025-07-10 ASSESSMENT — PATIENT HEALTH QUESTIONNAIRE - PHQ9
10. IF YOU CHECKED OFF ANY PROBLEMS, HOW DIFFICULT HAVE THESE PROBLEMS MADE IT FOR YOU TO DO YOUR WORK, TAKE CARE OF THINGS AT HOME, OR GET ALONG WITH OTHER PEOPLE: NOT DIFFICULT AT ALL
SUM OF ALL RESPONSES TO PHQ QUESTIONS 1-9: 1
SUM OF ALL RESPONSES TO PHQ QUESTIONS 1-9: 1

## 2025-07-10 ASSESSMENT — PAIN SCALES - GENERAL: PAINLEVEL_OUTOF10: NO PAIN (0)

## 2025-07-10 NOTE — PROGRESS NOTES
"  Subjective   Rayshawn is a 29 year old, presenting for the following health issues:  nexplanon        7/10/2025     2:55 PM   Additional Questions   Roomed by Mahogany HIGH   Accompanied by Self     HPI      Pre-Provider Visit Orders - Pregnancy Test  Reason for visit:  Requesting contraceptive procedure: Nexplanon (subdermal contraceptive)   Has the patient completed a home pregnancy test within the last 48 hours or does the patient receive regular Depo Provera injections?  No            Objective    /70 (BP Location: Right arm, Patient Position: Sitting, Cuff Size: Adult Regular)   Pulse 94   Resp 14   Ht 1.6 m (5' 3\")   Wt 59.4 kg (131 lb)   LMP 06/15/2025 (Approximate)   SpO2 100%   BMI 23.21 kg/m    Body mass index is 23.21 kg/m .          Signed Electronically by: Dodie Lofton MD    Answers submitted by the patient for this visit:  Patient Health Questionnaire (Submitted on 7/10/2025)  If you checked off any problems, how difficult have these problems made it for you to do your work, take care of things at home, or get along with other people?: Not difficult at all  PHQ9 TOTAL SCORE: 1      NEXPLANON REMOVAL/REINSERTION:    Is a pregnancy test required: Yes.  Was it positive or negative?  Negative  Was a consent obtained?  Yes    Subjective: Rani Lisa is a 29 year old No obstetric history on file. presents for Nexplanon.    Patient has been given the opportunity to ask questions about all forms of birth control, including all options appropriate for Rani Lisa. Discussed that no method of birth control, except abstinence is 100% effective against pregnancy or sexually transmitted infection.     Rani Lisa understands planning removal and reinsertion today    The entire removal and insertion procedure was reviewed with the patient, including care after placement.      /70 (BP Location: Right arm, Patient Position: Sitting, Cuff Size: Adult Regular)   Pulse 94   Resp " "14   Ht 1.6 m (5' 3\")   Wt 59.4 kg (131 lb)   LMP 06/15/2025 (Approximate)   SpO2 100%   BMI 23.21 kg/m      PROCEDURE NOTE: -- Nexplanon Removal/Insertion    Technique: On the left arm  Skin prep Betadine  Anesthesia 1% lidocaine, with epi  Procedure: Patient was placed supine with left arm exposed.  Implant located by palpitation. Elsy was made 8-10 cm above medial epicondyle and a guiding elsy 4 cm above the first.  Arm was prepped with Betadine. Incision point was anesthetized with 1 mL 1% lidocaine with epi for removal site, 4 mL 1% lido with epi for insertion site    Small incision (<5mm) was made at distal end of palpable implant, curved hemostat or mosquito forceps was used to isolate the implant and bring it to the incision, the fibrous capsule containing the implant  was incised and the implant was removed intact.    After stretching the skin with thumb and index finger around the insertion site, skin punctured with the tip of the needle inserted at 30 degrees and then lowered to horizontal position. While lifting the skin with the tip of the needle, inserted the needle to its full length. Applicator was then stabilized and the slider was unlocked by pushing it slightly down. Slider moved back completely until it stopped. Applicator was then removed.    Correct placement of the implant was confirmed by palpation in the patient's arm and visualizing the purple top of the obturator.   Bandage and pressure dressing applied to insertion site.    EBL: minimal    Complications: none    ASSESSMENT:     ICD-10-CM    1. Encounter for removal and reinsertion of Nexplanon  Z30.46 etonogestrel (NEXPLANON) subdermal implant 68 mg     REMOVAL AND REINSERTION NEXPLANON      2. Surveillance of previously prescribed implantable subdermal contraceptive  Z30.46 etonogestrel (NEXPLANON) subdermal implant 68 mg     REMOVAL AND REINSERTION NEXPLANON           PLAN:    Given 's handouts, including when to have " Nexplanon removed, list of danger s/sx, side effects and follow up recommended. Encouraged condom use for prevention of STD. Back up contraception advised for 7 days. Advised to call for any fever, for prolonged or severe pain or bleeding, abnormal vaginal dischage. She was advised to use pain medications (ibuprofen) as needed for mild to moderate pain.     Dodie Lofton MD

## 2025-08-14 ENCOUNTER — DOCUMENTATION ONLY (OUTPATIENT)
Dept: GASTROENTEROLOGY | Facility: CLINIC | Age: 30
End: 2025-08-14

## (undated) DEVICE — SOL NACL 0.9% IRRIG 1000ML BOTTLE 2F7124

## (undated) DEVICE — PACK ENDOSCOPY GI CUSTOM UMMC

## (undated) DEVICE — ENDO TUBING CO2 SMARTCAP STERILE DISP 100145CO2EXT

## (undated) DEVICE — SUCTION MANIFOLD NEPTUNE 2 SYS 4 PORT 0702-020-000

## (undated) DEVICE — SOL WATER IRRIG 1000ML BOTTLE 2F7114

## (undated) DEVICE — ENDO DEVICE LOCKING AND BIOPSY CAP M00545261

## (undated) DEVICE — LABEL MEDICATION SYSTEM 3303-P

## (undated) DEVICE — ENDO NDL BALL TIP ULTRASOUND 19GA ECHO-19

## (undated) DEVICE — ENDO PROBE COVER ULTRASOUND BALLOON LATEX  MAJ-249

## (undated) DEVICE — CATH RETRIEVAL BALLOON EXTRACTOR PRO RX-S INJ ABOVE 9-12MM

## (undated) DEVICE — BALLOON ELATION 180CML 11-12-13.5-15-16MM 5.5CM EPX12

## (undated) DEVICE — WIRE GUIDE 0.025"X450CM ANG VISIGLIDE G-240-2545A

## (undated) DEVICE — ESU GROUND PAD ADULT W/CORD E7507

## (undated) DEVICE — ENDO ENDO DISTAL MAJ-2315

## (undated) DEVICE — BITE BLOCK ENDO W/DENTAL RIM 54FR SBT-114-100

## (undated) DEVICE — ENDO FCP GRASPING ROTATABLE 7.2MMX2.6MM FG-244NR

## (undated) DEVICE — KIT CONNECTOR FOR OLYMPUS ENDOSCOPES DEFENDO 100310

## (undated) DEVICE — KIT ENDO FIRST STEP DISINFECTANT 200ML W/POUCH EP-4

## (undated) DEVICE — ENDO FUSION OMNI-TOME G31903

## (undated) DEVICE — TUBING SUCTION 10'X3/16" N510

## (undated) DEVICE — CATH SPYGLASS DS 2 DELIVERY & ACCESS M00546610

## (undated) DEVICE — ZIMMON BILIARY STENT
Type: IMPLANTABLE DEVICE | Site: STOMACH | Status: NON-FUNCTIONAL
Brand: ZIMMON
Removed: 2025-03-03

## (undated) DEVICE — WIRE GUIDE TRACER METRO DIRECT .021"X260CM STR TIP G55705

## (undated) DEVICE — ENDO BASKET RETRIEVAL F/BILE DUCT STN FG-V431P

## (undated) DEVICE — WIRE GUIDE 0.025"X270CM STR VISIGLIDE G-240-2527S

## (undated) DEVICE — ENDO BITE BLOCK ADULT OMNI-BLOC

## (undated) DEVICE — BIOPSY VALVE BIOSHIELD 00711135

## (undated) DEVICE — ENDO FUSION OMNI-TOME 21 FS-OMNI-21 G48675

## (undated) DEVICE — ENDO CAP AND TUBING STERILE FOR ENDOGATOR  100130

## (undated) DEVICE — ENDO NDL ASPIRATION 19GA FLEX SLIMLINE EXPECT EUS M00555530

## (undated) DEVICE — WIRE GUIDE 0.025"X270CM ANG VISIGLIDE G-240-2527A

## (undated) DEVICE — BASKET RETRIEVAL SPYGLASS 286CMX15MM M00546550

## (undated) DEVICE — ENDO SNARE POLYPECTOMY OVAL 15MM LOOP SD-240U-15

## (undated) DEVICE — INFLATION DEVICE BIG 60 ENDO-AN6012

## (undated) DEVICE — FULL COVERED METALLIC ESOPHAGUS STENT
Type: IMPLANTABLE DEVICE | Status: NON-FUNCTIONAL
Brand: HANAROSTENT® ESOPHAGUS TTS(CCC)

## (undated) DEVICE — WIRE GUIDE 0.025"X450CM STR VISIGLIDE G-240-2545S

## (undated) RX ORDER — FENTANYL CITRATE-0.9 % NACL/PF 10 MCG/ML
PLASTIC BAG, INJECTION (ML) INTRAVENOUS
Status: DISPENSED
Start: 2025-05-19

## (undated) RX ORDER — OXYCODONE HYDROCHLORIDE 5 MG/1
TABLET ORAL
Status: DISPENSED
Start: 2025-03-03

## (undated) RX ORDER — OXYCODONE HYDROCHLORIDE 10 MG/1
TABLET ORAL
Status: DISPENSED
Start: 2025-02-03

## (undated) RX ORDER — CEFAZOLIN SODIUM 2 G/100ML
INJECTION, SOLUTION INTRAVENOUS
Status: DISPENSED
Start: 2024-01-19

## (undated) RX ORDER — FENTANYL CITRATE 50 UG/ML
INJECTION, SOLUTION INTRAMUSCULAR; INTRAVENOUS
Status: DISPENSED
Start: 2025-03-03

## (undated) RX ORDER — ONDANSETRON 2 MG/ML
INJECTION INTRAMUSCULAR; INTRAVENOUS
Status: DISPENSED
Start: 2025-06-23

## (undated) RX ORDER — PROPOFOL 10 MG/ML
INJECTION, EMULSION INTRAVENOUS
Status: DISPENSED
Start: 2025-02-03

## (undated) RX ORDER — ONDANSETRON 2 MG/ML
INJECTION INTRAMUSCULAR; INTRAVENOUS
Status: DISPENSED
Start: 2025-03-03

## (undated) RX ORDER — FENTANYL CITRATE 50 UG/ML
INJECTION, SOLUTION INTRAMUSCULAR; INTRAVENOUS
Status: DISPENSED
Start: 2025-05-19

## (undated) RX ORDER — DEXAMETHASONE SODIUM PHOSPHATE 4 MG/ML
INJECTION, SOLUTION INTRA-ARTICULAR; INTRALESIONAL; INTRAMUSCULAR; INTRAVENOUS; SOFT TISSUE
Status: DISPENSED
Start: 2025-03-03

## (undated) RX ORDER — ACETAMINOPHEN 325 MG/1
TABLET ORAL
Status: DISPENSED
Start: 2025-03-03

## (undated) RX ORDER — PROPOFOL 10 MG/ML
INJECTION, EMULSION INTRAVENOUS
Status: DISPENSED
Start: 2025-06-23

## (undated) RX ORDER — FENTANYL CITRATE 50 UG/ML
INJECTION, SOLUTION INTRAMUSCULAR; INTRAVENOUS
Status: DISPENSED
Start: 2024-01-19

## (undated) RX ORDER — FENTANYL CITRATE 50 UG/ML
INJECTION, SOLUTION INTRAMUSCULAR; INTRAVENOUS
Status: DISPENSED
Start: 2025-02-03

## (undated) RX ORDER — ACETAMINOPHEN 325 MG/1
TABLET ORAL
Status: DISPENSED
Start: 2025-03-12

## (undated) RX ORDER — LEVOFLOXACIN 5 MG/ML
INJECTION, SOLUTION INTRAVENOUS
Status: DISPENSED
Start: 2025-03-03

## (undated) RX ORDER — HYDROMORPHONE HYDROCHLORIDE 1 MG/ML
INJECTION, SOLUTION INTRAMUSCULAR; INTRAVENOUS; SUBCUTANEOUS
Status: DISPENSED
Start: 2025-02-03

## (undated) RX ORDER — PROPOFOL 10 MG/ML
INJECTION, EMULSION INTRAVENOUS
Status: DISPENSED
Start: 2025-03-03

## (undated) RX ORDER — LIDOCAINE HYDROCHLORIDE 10 MG/ML
INJECTION, SOLUTION EPIDURAL; INFILTRATION; INTRACAUDAL; PERINEURAL
Status: DISPENSED
Start: 2024-01-19

## (undated) RX ORDER — EPHEDRINE SULFATE 50 MG/ML
INJECTION, SOLUTION INTRAMUSCULAR; INTRAVENOUS; SUBCUTANEOUS
Status: DISPENSED
Start: 2025-03-12

## (undated) RX ORDER — ONDANSETRON 2 MG/ML
INJECTION INTRAMUSCULAR; INTRAVENOUS
Status: DISPENSED
Start: 2025-05-19

## (undated) RX ORDER — DEXAMETHASONE SODIUM PHOSPHATE 4 MG/ML
INJECTION, SOLUTION INTRA-ARTICULAR; INTRALESIONAL; INTRAMUSCULAR; INTRAVENOUS; SOFT TISSUE
Status: DISPENSED
Start: 2025-05-19

## (undated) RX ORDER — ONDANSETRON 2 MG/ML
INJECTION INTRAMUSCULAR; INTRAVENOUS
Status: DISPENSED
Start: 2025-02-03

## (undated) RX ORDER — HYDROMORPHONE HCL IN WATER/PF 6 MG/30 ML
PATIENT CONTROLLED ANALGESIA SYRINGE INTRAVENOUS
Status: DISPENSED
Start: 2025-03-03

## (undated) RX ORDER — OXYCODONE HYDROCHLORIDE 5 MG/1
TABLET ORAL
Status: DISPENSED
Start: 2025-03-04

## (undated) RX ORDER — KETOROLAC TROMETHAMINE 30 MG/ML
INJECTION, SOLUTION INTRAMUSCULAR; INTRAVENOUS
Status: DISPENSED
Start: 2025-05-19

## (undated) RX ORDER — PROPOFOL 10 MG/ML
INJECTION, EMULSION INTRAVENOUS
Status: DISPENSED
Start: 2025-05-19

## (undated) RX ORDER — FENTANYL CITRATE-0.9 % NACL/PF 10 MCG/ML
PLASTIC BAG, INJECTION (ML) INTRAVENOUS
Status: DISPENSED
Start: 2025-06-23

## (undated) RX ORDER — FENTANYL CITRATE 50 UG/ML
INJECTION, SOLUTION INTRAMUSCULAR; INTRAVENOUS
Status: DISPENSED
Start: 2025-03-12

## (undated) RX ORDER — PANTOPRAZOLE SODIUM 40 MG/1
TABLET, DELAYED RELEASE ORAL
Status: DISPENSED
Start: 2025-03-04

## (undated) RX ORDER — SODIUM CHLORIDE, SODIUM LACTATE, POTASSIUM CHLORIDE, CALCIUM CHLORIDE 600; 310; 30; 20 MG/100ML; MG/100ML; MG/100ML; MG/100ML
INJECTION, SOLUTION INTRAVENOUS
Status: DISPENSED
Start: 2025-05-19

## (undated) RX ORDER — ONDANSETRON 2 MG/ML
INJECTION INTRAMUSCULAR; INTRAVENOUS
Status: DISPENSED
Start: 2024-01-19

## (undated) RX ORDER — ONDANSETRON 2 MG/ML
INJECTION INTRAMUSCULAR; INTRAVENOUS
Status: DISPENSED
Start: 2025-03-04

## (undated) RX ORDER — FENTANYL CITRATE 50 UG/ML
INJECTION, SOLUTION INTRAMUSCULAR; INTRAVENOUS
Status: DISPENSED
Start: 2025-06-23